# Patient Record
Sex: FEMALE | Race: WHITE | NOT HISPANIC OR LATINO | Employment: OTHER | URBAN - METROPOLITAN AREA
[De-identification: names, ages, dates, MRNs, and addresses within clinical notes are randomized per-mention and may not be internally consistent; named-entity substitution may affect disease eponyms.]

---

## 2017-01-03 ENCOUNTER — THERAPY VISIT (OUTPATIENT)
Dept: PHYSICAL THERAPY | Facility: CLINIC | Age: 62
End: 2017-01-03

## 2017-01-03 DIAGNOSIS — M25.511 SHOULDER PAIN, RIGHT: Primary | ICD-10-CM

## 2017-01-03 PROCEDURE — 97140 MANUAL THERAPY 1/> REGIONS: CPT | Mod: GP | Performed by: PHYSICAL THERAPIST

## 2017-01-03 PROCEDURE — 97110 THERAPEUTIC EXERCISES: CPT | Mod: GP | Performed by: PHYSICAL THERAPIST

## 2017-01-10 ENCOUNTER — THERAPY VISIT (OUTPATIENT)
Dept: PHYSICAL THERAPY | Facility: CLINIC | Age: 62
End: 2017-01-10

## 2017-01-10 DIAGNOSIS — M25.511 SHOULDER PAIN, RIGHT: Primary | ICD-10-CM

## 2017-01-10 PROCEDURE — 97110 THERAPEUTIC EXERCISES: CPT | Mod: GP | Performed by: PHYSICAL THERAPIST

## 2017-01-10 PROCEDURE — 97140 MANUAL THERAPY 1/> REGIONS: CPT | Mod: GP | Performed by: PHYSICAL THERAPIST

## 2017-01-17 ENCOUNTER — THERAPY VISIT (OUTPATIENT)
Dept: PHYSICAL THERAPY | Facility: CLINIC | Age: 62
End: 2017-01-17

## 2017-01-17 DIAGNOSIS — M25.511 SHOULDER PAIN, RIGHT: Primary | ICD-10-CM

## 2017-01-17 PROCEDURE — 97140 MANUAL THERAPY 1/> REGIONS: CPT | Mod: GP | Performed by: PHYSICAL THERAPIST

## 2017-01-17 PROCEDURE — 97110 THERAPEUTIC EXERCISES: CPT | Mod: GP | Performed by: PHYSICAL THERAPIST

## 2017-01-26 ENCOUNTER — THERAPY VISIT (OUTPATIENT)
Dept: PHYSICAL THERAPY | Facility: CLINIC | Age: 62
End: 2017-01-26

## 2017-01-26 DIAGNOSIS — M25.511 SHOULDER PAIN, RIGHT: Primary | ICD-10-CM

## 2017-01-26 PROCEDURE — 97140 MANUAL THERAPY 1/> REGIONS: CPT | Mod: GP | Performed by: PHYSICAL THERAPIST

## 2017-01-26 PROCEDURE — 97110 THERAPEUTIC EXERCISES: CPT | Mod: GP | Performed by: PHYSICAL THERAPIST

## 2017-01-31 ENCOUNTER — THERAPY VISIT (OUTPATIENT)
Dept: PHYSICAL THERAPY | Facility: CLINIC | Age: 62
End: 2017-01-31

## 2017-01-31 DIAGNOSIS — M25.511 SHOULDER PAIN, RIGHT: Primary | ICD-10-CM

## 2017-01-31 PROCEDURE — 97140 MANUAL THERAPY 1/> REGIONS: CPT | Mod: GP | Performed by: PHYSICAL THERAPIST

## 2017-01-31 PROCEDURE — 97110 THERAPEUTIC EXERCISES: CPT | Mod: GP | Performed by: PHYSICAL THERAPIST

## 2017-03-27 DIAGNOSIS — E03.9 HYPOTHYROIDISM, UNSPECIFIED TYPE: ICD-10-CM

## 2017-03-27 NOTE — TELEPHONE ENCOUNTER
Reason for Call:  Medication or medication refill:    Do you use a [facility]?  Name of the pharmacy and phone number for the current request:  See above    Name of the medication requested: levothyroxine    Other request: patient is going out of town and needs her medication at the very latest on Tuesday.  please    Can we leave a detailed message on this number? YES    Phone number patient can be reached at: Home number on file [phone] (home)    Best Time:     Call taken on 3/27/2017 at 8:54 AM by Cata Ma

## 2017-03-28 RX ORDER — LEVOTHYROXINE SODIUM 50 UG/1
50 TABLET ORAL DAILY
Qty: 30 TABLET | Refills: 0 | Status: SHIPPED | OUTPATIENT
Start: 2017-03-28 | End: 2017-04-24

## 2017-03-28 NOTE — TELEPHONE ENCOUNTER
Medication is being filled for 1 time refill only due to:   Over due for office visit and/or labs   ROBERT Chiu  RN/Andrey Grady

## 2017-04-24 DIAGNOSIS — E03.9 HYPOTHYROIDISM, UNSPECIFIED TYPE: ICD-10-CM

## 2017-04-24 NOTE — TELEPHONE ENCOUNTER
Pt is calling and states that she knows she needs to be seen in order to get a refill on her medication, but is waiting to get long term heatlh care insurance and it might be three months yet, she would  Have to pay out of poocket to come in, she is asking for another 3 month supply on her synthroid, she states she feels fine. Please advise.

## 2017-04-25 RX ORDER — LEVOTHYROXINE SODIUM 50 UG/1
50 TABLET ORAL DAILY
Qty: 90 TABLET | Refills: 0 | Status: SHIPPED | OUTPATIENT
Start: 2017-04-25 | End: 2017-07-29

## 2017-04-25 NOTE — TELEPHONE ENCOUNTER
Pharmacy is calling and asking if  We can fill this medication ASAP, pt does also have the appt today and is wondering if she can cancel it? Please see note below and advise.     Patricia Squires, [place]

## 2017-07-22 ENCOUNTER — HEALTH MAINTENANCE LETTER (OUTPATIENT)
Age: 62
End: 2017-07-22

## 2017-07-29 DIAGNOSIS — E03.9 HYPOTHYROIDISM, UNSPECIFIED TYPE: ICD-10-CM

## 2017-07-31 RX ORDER — LEVOTHYROXINE SODIUM 50 UG/1
TABLET ORAL
Qty: 30 TABLET | Refills: 0 | Status: SHIPPED | OUTPATIENT
Start: 2017-07-31 | End: 2017-08-08

## 2017-07-31 NOTE — TELEPHONE ENCOUNTER
Levothyroxine 50mcg     Last Written Prescription Date: 04/25/2017 #90 x 0  Last filled 04/25/2017  Last Office Visit with G, P or [facility] prescribing provider: 12/14/2015 RENAY Balbuena        TSH   Date Value Ref Range Status   12/11/2015 3.38 0.40 - 4.00 mU/L Final

## 2017-08-08 ENCOUNTER — OFFICE VISIT (OUTPATIENT)
Dept: FAMILY MEDICINE | Facility: CLINIC | Age: 62
End: 2017-08-08

## 2017-08-08 VITALS
WEIGHT: 139.6 LBS | DIASTOLIC BLOOD PRESSURE: 62 MMHG | TEMPERATURE: 99 F | HEART RATE: 76 BPM | HEIGHT: 65 IN | BODY MASS INDEX: 23.26 KG/M2 | SYSTOLIC BLOOD PRESSURE: 112 MMHG

## 2017-08-08 DIAGNOSIS — N95.2 ATROPHIC VAGINITIS: ICD-10-CM

## 2017-08-08 DIAGNOSIS — Z80.49 FAMILY HISTORY OF MALIGNANT NEOPLASM OF ENDOMETRIUM: ICD-10-CM

## 2017-08-08 DIAGNOSIS — Z11.59 NEED FOR HEPATITIS C SCREENING TEST: ICD-10-CM

## 2017-08-08 DIAGNOSIS — Z13.6 CARDIOVASCULAR SCREENING; LDL GOAL LESS THAN 160: ICD-10-CM

## 2017-08-08 DIAGNOSIS — E03.9 HYPOTHYROIDISM, UNSPECIFIED TYPE: ICD-10-CM

## 2017-08-08 DIAGNOSIS — Z01.419 ENCOUNTER FOR GYNECOLOGICAL EXAMINATION WITHOUT ABNORMAL FINDING: Primary | ICD-10-CM

## 2017-08-08 PROCEDURE — G0145 SCR C/V CYTO,THINLAYER,RESCR: HCPCS | Performed by: NURSE PRACTITIONER

## 2017-08-08 PROCEDURE — 99396 PREV VISIT EST AGE 40-64: CPT | Performed by: NURSE PRACTITIONER

## 2017-08-08 PROCEDURE — 87624 HPV HI-RISK TYP POOLED RSLT: CPT | Performed by: NURSE PRACTITIONER

## 2017-08-08 RX ORDER — LEVOTHYROXINE SODIUM 50 UG/1
TABLET ORAL
Qty: 90 TABLET | Refills: 3 | Status: SHIPPED | OUTPATIENT
Start: 2017-08-08 | End: 2018-08-19

## 2017-08-08 RX ORDER — GINKGO BILOBA LEAF EXTRACT 60 MG
CAPSULE ORAL
COMMUNITY
End: 2018-08-30

## 2017-08-08 NOTE — PATIENT INSTRUCTIONS
Preventive Health Recommendations  Female Ages 50 - 64    Yearly exam: See your health care provider every year in order to  o Review health changes.   o Discuss preventive care.    o Review your medicines if your doctor has prescribed any.      Get a Pap test every three years (unless you have an abnormal result and your provider advises testing more often).    If you get Pap tests with HPV test, you only need to test every 5 years, unless you have an abnormal result.     You do not need a Pap test if your uterus was removed (hysterectomy) and you have not had cancer.    You should be tested each year for STDs (sexually transmitted diseases) if you're at risk.     Have a mammogram every 1 to 2 years.    Have a colonoscopy at age 50, or have a yearly FIT test (stool test). These exams screen for colon cancer.      Have a cholesterol test every 5 years, or more often if advised.    Have a diabetes test (fasting glucose) every three years. If you are at risk for diabetes, you should have this test more often.     If you are at risk for osteoporosis (brittle bone disease), think about having a bone density scan (DEXA).    Shots: Get a flu shot each year. Get a tetanus shot every 10 years.    Nutrition:     Eat at least 5 servings of fruits and vegetables each day.    Eat whole-grain bread, whole-wheat pasta and brown rice instead of white grains and rice.    Talk to your provider about Calcium and Vitamin D.     Lifestyle    Exercise at least 150 minutes a week (30 minutes a day, 5 days a week). This will help you control your weight and prevent disease.    Limit alcohol to one drink per day.    No smoking.     Wear sunscreen to prevent skin cancer.     See your dentist every six months for an exam and cleaning.    See your eye doctor every 1 to 2 years.      For the neck  - try stretching . For 10 seconds.  - resist for 10 seconds and then stretch for 10 seconds     Check with the different pharmacies.   The   Shingles vaccine is around  $250.   The Pneumonia  Vaccine  13 this year and then next year Pneumonia vaccine  23     Keep exercising .     Stay well hydrated - urine should be clear.          You will need to make a lab only appointment,  [phone].   Do come in fasting

## 2017-08-08 NOTE — NURSING NOTE
"Chief Complaint   Patient presents with     Physical       Initial /62 (BP Location: Left arm, Patient Position: Chair, Cuff Size: Adult Regular)  Pulse 76  Temp 99  F (37.2  C) (Tympanic)  Ht 5' 5.25\" (1.657 m)  Wt 139 lb 9.6 oz (63.3 kg)  BMI 23.05 kg/m2 Estimated body mass index is 23.05 kg/(m^2) as calculated from the following:    Height as of this encounter: 5' 5.25\" (1.657 m).    Weight as of this encounter: 139 lb 9.6 oz (63.3 kg).  Medication Reconciliation: complete     Brisa Nevarez CMA (AAMA)      "

## 2017-08-08 NOTE — PROGRESS NOTES
SUBJECTIVE:   CC: Eleazar Cortez is an 62 year old woman who presents for preventive health visit.     Healthy Habits:    Do you get at least three servings of calcium containing foods daily (dairy, green leafy vegetables, etc.)? yes    Amount of exercise or daily activities, outside of work: 3 or more day(s) per week  walking ,  [facility]     Problems taking medications regularly No    Medication side effects: No    Have you had an eye exam in the past two years? yes    Do you see a dentist twice per year? yes    Do you have sleep apnea, excessive snoring or daytime drowsiness?no    *Is not fasting.    *Check thyroid.    *No health care concerns. Is working out [facility] [employer] 12 times per month     Today's PHQ-2 Score:   PHQ-2 ( 1999 [employer]) 8/8/2017 12/14/2015   Q1: Little interest or pleasure in doing things 0 0   Q2: Feeling down, depressed or hopeless 0 0   PHQ-2 Score 0 0         Abuse: Current or Past(Physical, Sexual or Emotional)- No  Do you feel safe in your environment - Yes  Social History   Substance Use Topics     Smoking status: Never Smoker     Smokeless tobacco: Never Used     Alcohol use No     The patient does not drink >3 drinks per day nor >7 drinks per week.    Reviewed orders with patient.  Reviewed health maintenance and updated orders accordingly - Yes  Labs reviewed in [facility]    Patient over age 50, mutual decision to screen reflected in health maintenance.      Pertinent mammograms are reviewed under the imaging tab.  History of abnormal Pap smear: NO - age 30- 65 PAP every 3 years recommended    Reviewed and updated as needed this visit by clinical staff  Tobacco  Allergies  Meds  Med Hx  Surg Hx  Fam Hx  Soc Hx        Reviewed and updated as needed this visit by Provider  Allergies  Meds              ROS:  C: NEGATIVE for fever, chills, change in weight  INTEGUMENTARY/SKIN: NEGATIVE for worrisome rashes, moles or lesions  EYES: NEGATIVE for vision changes or irritation and POSITIVE for  "does have floaters.  Current with eye exam   ENT: NEGATIVE for ear, mouth and throat problems and current with dentist   R: NEGATIVE for significant cough or SOB  B: NEGATIVE for masses, tenderness or discharge  CV: NEGATIVE for chest pain, palpitations or peripheral edema  GI: NEGATIVE for nausea, abdominal pain, heartburn, or change in bowel habits  : NEGATIVE for unusual urinary or vaginal symptoms. No vaginal bleeding.  M: NEGATIVE for significant arthralgias or myalgia  N: NEGATIVE for weakness, dizziness or paresthesias  P: NEGATIVE for changes in mood or affect     OBJECTIVE:   /62 (BP Location: Left arm, Patient Position: Chair, Cuff Size: Adult Regular)  Pulse 76  Temp 99  F (37.2  C) (Tympanic)  Ht 5' 5.25\" (1.657 m)  Wt 139 lb 9.6 oz (63.3 kg)  BMI 23.05 kg/m2  EXAM:  GENERAL APPEARANCE: healthy, alert and no distress  EYES: Eyes grossly normal to inspection, PERRL and conjunctivae and sclerae normal  HENT: ear canals and TM's normal, nose and mouth without ulcers or lesions, oropharynx clear and oral mucous membranes moist  NECK: no adenopathy, no asymmetry, masses, or scars and thyroid normal to palpation  RESP: lungs clear to auscultation - no rales, rhonchi or wheezes  BREAST: normal without masses, tenderness or nipple discharge and no palpable axillary masses or adenopathy  CV: regular rate and rhythm, normal S1 S2, no S3 or S4, no murmur, click or rub, no peripheral edema and peripheral pulses strong  ABDOMEN: soft, nontender, no hepatosplenomegaly, no masses and bowel sounds normal   (female): normal female external genitalia, normal urethral meatus, vaginal mucosal atrophy noted and normal cervix, adnexae, and uterus without masses.  MS: no musculoskeletal defects are noted and gait is age appropriate without ataxia  SKIN: no suspicious lesions or rashes  NEURO: Normal strength and tone, sensory exam grossly normal, mentation intact and speech normal  PSYCH: mentation appears " normal and affect normal/bright    ASSESSMENT/PLAN:     ASSESSMENT/PLAN:      ICD-10-CM    1. Encounter for gynecological examination without abnormal finding Z01.419 Pap imaged thin layer screen with HPV - recommended age 30 - 65     HPV High Risk Types DNA Cervical     Basic metabolic panel  (Ca, Cl, CO2, Creat, Gluc, K, Na, BUN)     CANCELED: Basic metabolic panel  (Ca, Cl, CO2, Creat, Gluc, K, Na, BUN)   2. Hypothyroidism, unspecified type E03.9 levothyroxine (SYNTHROID/LEVOTHROID) 50 MCG tablet     TSH     CANCELED: TSH   3. CARDIOVASCULAR SCREENING; LDL GOAL LESS THAN 160 Z13.6 Lipid panel reflex to direct LDL     CANCELED: Lipid panel reflex to direct LDL   4. Need for hepatitis C screening test Z11.59 Hepatitis C Screen Reflex to HCV RNA Quant and Genotype     CANCELED: **Hepatitis C Screen Reflex to RNA FUTURE anytime   5. Family history of malignant neoplasm of endometrium Z80.49 Pap imaged thin layer screen with HPV - recommended age 30 - 65    mother at age 83   6. Atrophic vaginitis N95.2        Patient Instructions     Preventive Health Recommendations  Female Ages 50 - 64    Yearly exam: See your health care provider every year in order to  o Review health changes.   o Discuss preventive care.    o Review your medicines if your doctor has prescribed any.      Get a Pap test every three years (unless you have an abnormal result and your provider advises testing more often).    If you get Pap tests with HPV test, you only need to test every 5 years, unless you have an abnormal result.     You do not need a Pap test if your uterus was removed (hysterectomy) and you have not had cancer.    You should be tested each year for STDs (sexually transmitted diseases) if you're at risk.     Have a mammogram every 1 to 2 years.    Have a colonoscopy at age 50, or have a yearly FIT test (stool test). These exams screen for colon cancer.      Have a cholesterol test every 5 years, or more often if advised.    Have a  "diabetes test (fasting glucose) every three years. If you are at risk for diabetes, you should have this test more often.     If you are at risk for osteoporosis (brittle bone disease), think about having a bone density scan (DEXA).    Shots: Get a flu shot each year. Get a tetanus shot every 10 years.    Nutrition:     Eat at least 5 servings of fruits and vegetables each day.    Eat whole-grain bread, whole-wheat pasta and brown rice instead of white grains and rice.    Talk to your provider about Calcium and Vitamin D.     Lifestyle    Exercise at least 150 minutes a week (30 minutes a day, 5 days a week). This will help you control your weight and prevent disease.    Limit alcohol to one drink per day.    No smoking.     Wear sunscreen to prevent skin cancer.     See your dentist every six months for an exam and cleaning.    See your eye doctor every 1 to 2 years.      For the neck  - try stretching . For 10 seconds.  - resist for 10 seconds and then stretch for 10 seconds     Check with the different pharmacies.   The  Shingles vaccine is around  $250.   The Pneumonia  Vaccine  13 this year and then next year Pneumonia vaccine  23     Keep exercising .     Stay well hydrated - urine should be clear.          You will need to make a lab only appointment,  [phone].   Do come in fasting                COUNSELING:   Reviewed preventive health counseling, as reflected in patient instructions       Regular exercise       Healthy diet/nutrition       Vision screening       Colon cancer screening       Advance Care Planning         reports that she has never smoked. She has never used smokeless tobacco.    Estimated body mass index is 23.05 kg/(m^2) as calculated from the following:    Height as of this encounter: 5' 5.25\" (1.657 m).    Weight as of this encounter: 139 lb 9.6 oz (63.3 kg).         Counseling Resources:  ATP IV Guidelines  Pooled Cohorts Equation Calculator  Breast Cancer Risk Calculator  FRAX Risk " Assessment  ICSI Preventive Guidelines  Dietary Guidelines for Americans, 2010  USDA's MyPlate  ASA Prophylaxis  Lung CA Screening    ADIN RODRIGUEZ NP, APRN CNP  [facility]

## 2017-08-08 NOTE — MR AVS SNAPSHOT
After Visit Summary   8/8/2017    Eleazar Cortez    MRN: [id number]           Patient Information     Date Of Birth          1955        Visit Information        Provider Department      8/8/2017 2:00 PM Kika Balbuena APRN [facility]        Today's Diagnoses     Encounter for gynecological examination without abnormal finding    -  1    Hypothyroidism, unspecified type        CARDIOVASCULAR SCREENING; LDL GOAL LESS THAN 160        Need for hepatitis C screening test        Family history of malignant neoplasm of endometrium        Atrophic vaginitis          Care Instructions      Preventive Health Recommendations  Female Ages 50 - 64    Yearly exam: See your health care provider every year in order to  o Review health changes.   o Discuss preventive care.    o Review your medicines if your doctor has prescribed any.      Get a Pap test every three years (unless you have an abnormal result and your provider advises testing more often).    If you get Pap tests with HPV test, you only need to test every 5 years, unless you have an abnormal result.     You do not need a Pap test if your uterus was removed (hysterectomy) and you have not had cancer.    You should be tested each year for STDs (sexually transmitted diseases) if you're at risk.     Have a mammogram every 1 to 2 years.    Have a colonoscopy at age 50, or have a yearly FIT test (stool test). These exams screen for colon cancer.      Have a cholesterol test every 5 years, or more often if advised.    Have a diabetes test (fasting glucose) every three years. If you are at risk for diabetes, you should have this test more often.     If you are at risk for osteoporosis (brittle bone disease), think about having a bone density scan (DEXA).    Shots: Get a flu shot each year. Get a tetanus shot every 10 years.    Nutrition:     Eat at least 5 servings of fruits and vegetables each day.    Eat whole-grain bread,  whole-wheat pasta and brown rice instead of white grains and rice.    Talk to your provider about Calcium and Vitamin D.     Lifestyle    Exercise at least 150 minutes a week (30 minutes a day, 5 days a week). This will help you control your weight and prevent disease.    Limit alcohol to one drink per day.    No smoking.     Wear sunscreen to prevent skin cancer.     See your dentist every six months for an exam and cleaning.    See your eye doctor every 1 to 2 years.      For the neck  - try stretching . For 10 seconds.  - resist for 10 seconds and then stretch for 10 seconds     Check with the different pharmacies.   The  Shingles vaccine is around  $250.   The Pneumonia  Vaccine  13 this year and then next year Pneumonia vaccine  23     Keep exercising .     Stay well hydrated - urine should be clear.          You will need to make a lab only appointment,  [phone].   Do come in fasting              Follow-ups after your visit        Future tests that were ordered for you today     Open Future Orders        Priority Expected Expires Ordered    TSH Routine  8/31/2017 8/8/2017    Lipid panel reflex to direct LDL Routine  8/31/2017 8/8/2017    Basic metabolic panel  (Ca, Cl, CO2, Creat, Gluc, K, Na, BUN) Routine  8/31/2017 8/8/2017    Hepatitis C Screen Reflex to HCV RNA Quant and Genotype Routine  8/31/2017 8/8/2017            Who to contact     Normal or non-critical lab and imaging results will be communicated to you by MyChart, letter or phone within 4 business days after the clinic has received the results. If you do not hear from us within 7 days, please contact the clinic through MyChart or phone. If you have a critical or abnormal lab result, we will notify you by phone as soon as possible.  Submit refill requests through [employer] or call your pharmacy and they will forward the refill request to us. Please allow 3 business days for your refill to be completed.          If you need to speak with a Medical  " for additional information , please call: [phone]           Additional Information About Your Visit        emidshart Information     [employer] gives you secure access to your electronic health record. If you see a primary care provider, you can also send messages to your care team and make appointments. If you have questions, please call your primary care clinic.  If you do not have a primary care provider, please call [phone] and they will assist you.        Care EveryWhere ID     This is your Care EveryWhere ID. This could be used by other organizations to access your [facility] medical records  [id number]        Your Vitals Were     Pulse Temperature Height BMI (Body Mass Index)          76 99  F (37.2  C) (Tympanic) 5' 5.25\" (1.657 m) 23.05 kg/m2         Blood Pressure from Last 3 Encounters:   08/08/17 112/62   12/18/15 100/60   12/14/15 108/66    Weight from Last 3 Encounters:   08/08/17 139 lb 9.6 oz (63.3 kg)   12/18/15 126 lb (57.2 kg)   12/14/15 126 lb (57.2 kg)              We Performed the Following     HPV High Risk Types DNA Cervical     Pap imaged thin layer screen with HPV - recommended age 30 - 65          Today's Medication Changes          These changes are accurate as of: 8/8/17  3:08 PM.  If you have any questions, ask your nurse or doctor.               These medicines have changed or have updated prescriptions.        Dose/Directions    levothyroxine 50 MCG tablet   Commonly known as:  SYNTHROID/LEVOTHROID   This may have changed:  See the new instructions.   Used for:  Hypothyroidism, unspecified type   Changed by:  Kika Balbuena APRN CNP        TAKE 1 TABLET (50 MCG) BY MOUTH DAILY   Quantity:  90 tablet   Refills:  3            Where to get your medicines      These medications were sent to [facility] [address] [place] [facility] [address]  [address] [facility] [address]     Phone:  [phone]     levothyroxine 50 MCG tablet                Primary " Care Provider Office Phone # Fax #    Kika Valentin PepeRita Balbuena, SINGH [facility] [phone]       [facility] [address]   [facility] [place] [facility] [address]        Equal Access to Services     KAYE REYES AH: Hadbishnu mora ku hildao Soomaali, waaxda luqadaha, qaybta kaalmada adeegyada, waxcarol rauschin lexusn richarjun tolentino stalin wood. So [facility] [phone].    ATENCIÓN: Si habla español, tiene a chow disposición servicios gratuitos de asistencia lingüística. Llame al [phone].    We comply with applicable federal civil rights laws and [place] laws. We do not discriminate on the basis of race, color, national origin, age, disability sex, sexual orientation or gender identity.            Thank you!     Thank you for choosing [facility]  for your care. Our goal is always to provide you with excellent care. Hearing back from our patients is one way we can continue to improve our services. Please take a few minutes to complete the written survey that you may receive in the mail after your visit with us. Thank you!             Your Updated Medication List - Protect others around you: Learn how to safely use, store and throw away your medicines at www.disposemymeds.org.          This list is accurate as of: 8/8/17  3:08 PM.  Always use your most recent med list.                   Brand Name Dispense Instructions for use Diagnosis    calcium + D 600-200 MG-UNIT Tabs   Generic drug:  calcium carbonate-vitamin D      1 TABLET DAILY        CENTRY ADVANTAGE PO      1 tablet by mouth daily    Routine general medical examination at a health care facility       conjugated estrogens cream    PREMARIN    30 g    Place 0.5 g vaginally twice a week    Atrophic vaginitis       GARLIC      1,000 mg.        GINKGO BILOBA      50 mg.    Routine general medical examination at a health care facility       Ginseng 100 MG Caps           GRAPE SEED COMPLEX PO      Take 1 tablet by mouth daily    Knee pain, right        ibuprofen 200 MG tablet    ADVIL/MOTRIN     Take 800 mg by mouth every 8 hours as needed.        levothyroxine 50 MCG tablet    SYNTHROID/LEVOTHROID    90 tablet    TAKE 1 TABLET (50 MCG) BY MOUTH DAILY    Hypothyroidism, unspecified type       omega-3 fatty acids 1200 MG capsule      Take 1 capsule by mouth daily.        PROBIOTIC DAILY PO           SUPER B COMPLEX PO      Take  by mouth daily.        UNABLE TO FIND      MEDICATION NAME: Replenix (glucosamine, ginseng, enzymes, calcium and green tea)        VITAMIN D3      2,000 Units.

## 2017-08-15 LAB
COPATH REPORT: NORMAL
PAP: NORMAL

## 2017-08-16 LAB
FINAL DIAGNOSIS: NORMAL
HPV HR 12 DNA CVX QL NAA+PROBE: NEGATIVE
HPV16 DNA SPEC QL NAA+PROBE: NEGATIVE
HPV18 DNA SPEC QL NAA+PROBE: NEGATIVE
SPECIMEN DESCRIPTION: NORMAL

## 2017-08-22 ENCOUNTER — ALLIED HEALTH/NURSE VISIT (OUTPATIENT)
Dept: FAMILY MEDICINE | Facility: CLINIC | Age: 62
End: 2017-08-22

## 2017-08-22 DIAGNOSIS — Z13.6 CARDIOVASCULAR SCREENING; LDL GOAL LESS THAN 160: ICD-10-CM

## 2017-08-22 DIAGNOSIS — Z23 ENCOUNTER FOR IMMUNIZATION: Primary | ICD-10-CM

## 2017-08-22 DIAGNOSIS — E03.9 HYPOTHYROIDISM, UNSPECIFIED TYPE: ICD-10-CM

## 2017-08-22 DIAGNOSIS — Z01.419 ENCOUNTER FOR GYNECOLOGICAL EXAMINATION WITHOUT ABNORMAL FINDING: ICD-10-CM

## 2017-08-22 DIAGNOSIS — Z11.59 NEED FOR HEPATITIS C SCREENING TEST: ICD-10-CM

## 2017-08-22 LAB
ANION GAP SERPL CALCULATED.3IONS-SCNC: 4 MMOL/L (ref 3–14)
BUN SERPL-MCNC: 16 MG/DL (ref 7–30)
CALCIUM SERPL-MCNC: 8.8 MG/DL (ref 8.5–10.1)
CHLORIDE SERPL-SCNC: 106 MMOL/L (ref 94–109)
CHOLEST SERPL-MCNC: 256 MG/DL
CO2 SERPL-SCNC: 30 MMOL/L (ref 20–32)
CREAT SERPL-MCNC: 1.02 MG/DL (ref 0.52–1.04)
GFR SERPL CREATININE-BSD FRML MDRD: 55 ML/MIN/1.7M2
GLUCOSE SERPL-MCNC: 87 MG/DL (ref 70–99)
HDLC SERPL-MCNC: 88 MG/DL
LDLC SERPL CALC-MCNC: 146 MG/DL
NONHDLC SERPL-MCNC: 168 MG/DL
POTASSIUM SERPL-SCNC: 4 MMOL/L (ref 3.4–5.3)
SODIUM SERPL-SCNC: 140 MMOL/L (ref 133–144)
TRIGL SERPL-MCNC: 112 MG/DL
TSH SERPL DL<=0.005 MIU/L-ACNC: 2.36 MU/L (ref 0.4–4)

## 2017-08-22 PROCEDURE — 90471 IMMUNIZATION ADMIN: CPT

## 2017-08-22 PROCEDURE — 84443 ASSAY THYROID STIM HORMONE: CPT | Performed by: NURSE PRACTITIONER

## 2017-08-22 PROCEDURE — 86803 HEPATITIS C AB TEST: CPT | Performed by: NURSE PRACTITIONER

## 2017-08-22 PROCEDURE — 36415 COLL VENOUS BLD VENIPUNCTURE: CPT | Performed by: NURSE PRACTITIONER

## 2017-08-22 PROCEDURE — 80048 BASIC METABOLIC PNL TOTAL CA: CPT | Performed by: NURSE PRACTITIONER

## 2017-08-22 PROCEDURE — 80061 LIPID PANEL: CPT | Performed by: NURSE PRACTITIONER

## 2017-08-22 PROCEDURE — 90670 PCV13 VACCINE IM: CPT

## 2017-08-22 PROCEDURE — 99207 ZZC NO CHARGE NURSE ONLY: CPT

## 2017-08-23 LAB — HCV AB SERPL QL IA: NONREACTIVE

## 2018-04-12 ENCOUNTER — TELEPHONE (OUTPATIENT)
Dept: FAMILY MEDICINE | Facility: CLINIC | Age: 63
End: 2018-04-12

## 2018-04-12 DIAGNOSIS — Z12.31 ENCOUNTER FOR SCREENING MAMMOGRAM FOR MALIGNANT NEOPLASM OF BREAST: Primary | ICD-10-CM

## 2018-04-12 NOTE — TELEPHONE ENCOUNTER
Panel Management Review      Patient has the following on her problem list: None      Composite cancer screening  Chart review shows that this patient is due/due soon for the following Mammogram  Summary:    Patient is due/failing the following:   MAMMOGRAM    Action needed:   Patient needs referral/order: MAMMOGRAM    Type of outreach:    Sent Directlyt message.    Questions for provider review:    None                                                                                                                                    Brisa Mercer CMA ([facility])       Chart routed to None .

## 2018-08-09 ENCOUNTER — HOSPITAL ENCOUNTER (OUTPATIENT)
Dept: MAMMOGRAPHY | Facility: CLINIC | Age: 63
Discharge: HOME | End: 2018-08-09
Attending: NURSE PRACTITIONER | Admitting: NURSE PRACTITIONER

## 2018-08-09 DIAGNOSIS — Z12.31 ENCOUNTER FOR SCREENING MAMMOGRAM FOR MALIGNANT NEOPLASM OF BREAST: ICD-10-CM

## 2018-08-09 PROCEDURE — 77063 BREAST TOMOSYNTHESIS BI: CPT

## 2018-08-19 DIAGNOSIS — E03.9 HYPOTHYROIDISM, UNSPECIFIED TYPE: ICD-10-CM

## 2018-08-20 NOTE — TELEPHONE ENCOUNTER
"Requested Prescriptions   Pending Prescriptions Disp Refills     levothyroxine (SYNTHROID/LEVOTHROID) 50 MCG tablet [Pharmacy Med Name: LEVOTHYROXINE 50 MCG TABLET] 90 tablet 3    Last Written Prescription Date:  08/08/2017 #90 x 3  Last filled 05/22/2018  Last office visit: 08/08/2017 RENAY Balbuena   Future Office Visit: None    Sig: TAKE 1 TABLET (50 MCG) BY MOUTH DAILY    Thyroid Protocol Failed    8/19/2018  1:44 AM       Failed - Recent (12 mo) or future (30 days) visit within the authorizing provider's specialty    Patient had office visit in the last 12 months or has a visit in the next 30 days with authorizing provider or within the authorizing provider's specialty.  See \"Patient Info\" tab in inbasket, or \"Choose Columns\" in Meds & Orders section of the refill encounter.           Passed - Patient is 12 years or older       Passed - Normal TSH on file in past 12 months    Recent Labs   Lab Test  08/22/17   0845   TSH  2.36             Passed - No active pregnancy on record    If patient is pregnant or has had a positive pregnancy test, please check TSH.         Passed - No positive pregnancy test in past 12 months    If patient is pregnant or has had a positive pregnancy test, please check TSH.            "

## 2018-08-21 RX ORDER — LEVOTHYROXINE SODIUM 50 UG/1
50 TABLET ORAL DAILY
Qty: 30 TABLET | Refills: 0 | Status: SHIPPED | OUTPATIENT
Start: 2018-08-21 | End: 2018-09-24

## 2018-08-21 NOTE — TELEPHONE ENCOUNTER
Medication is being filled for 1 time refill only due to:  Patient needs labs TSH. Patient needs to be seen because it has been more than one year since last visit.   I left a message for the patient to return call. CSS please assist pt in scheduling Office Visit.    Danielle DUARTE RN

## 2018-08-30 ENCOUNTER — OFFICE VISIT (OUTPATIENT)
Dept: FAMILY MEDICINE | Facility: CLINIC | Age: 63
End: 2018-08-30

## 2018-08-30 VITALS
SYSTOLIC BLOOD PRESSURE: 104 MMHG | WEIGHT: 139.6 LBS | HEART RATE: 76 BPM | BODY MASS INDEX: 23.26 KG/M2 | DIASTOLIC BLOOD PRESSURE: 58 MMHG | RESPIRATION RATE: 14 BRPM | TEMPERATURE: 97.4 F | HEIGHT: 65 IN

## 2018-08-30 DIAGNOSIS — Z11.4 SCREENING FOR HIV (HUMAN IMMUNODEFICIENCY VIRUS): ICD-10-CM

## 2018-08-30 DIAGNOSIS — Z13.6 CARDIOVASCULAR SCREENING; LDL GOAL LESS THAN 160: ICD-10-CM

## 2018-08-30 DIAGNOSIS — E03.9 HYPOTHYROIDISM, UNSPECIFIED TYPE: ICD-10-CM

## 2018-08-30 DIAGNOSIS — Z85.828 HISTORY OF SKIN CANCER: ICD-10-CM

## 2018-08-30 DIAGNOSIS — R53.83 FATIGUE, UNSPECIFIED TYPE: ICD-10-CM

## 2018-08-30 DIAGNOSIS — Z00.00 ROUTINE GENERAL MEDICAL EXAMINATION AT A HEALTH CARE FACILITY: Primary | ICD-10-CM

## 2018-08-30 DIAGNOSIS — E55.9 VITAMIN D DEFICIENCY: ICD-10-CM

## 2018-08-30 LAB
ALBUMIN SERPL-MCNC: 3.7 G/DL (ref 3.4–5)
ALP SERPL-CCNC: 56 U/L (ref 40–150)
ALT SERPL W P-5'-P-CCNC: 17 U/L (ref 0–50)
ANION GAP SERPL CALCULATED.3IONS-SCNC: 5 MMOL/L (ref 3–14)
AST SERPL W P-5'-P-CCNC: 22 U/L (ref 0–45)
BILIRUB SERPL-MCNC: 0.8 MG/DL (ref 0.2–1.3)
BUN SERPL-MCNC: 13 MG/DL (ref 7–30)
CALCIUM SERPL-MCNC: 8.6 MG/DL (ref 8.5–10.1)
CHLORIDE SERPL-SCNC: 103 MMOL/L (ref 94–109)
CHOLEST SERPL-MCNC: 241 MG/DL
CO2 SERPL-SCNC: 29 MMOL/L (ref 20–32)
CREAT SERPL-MCNC: 0.96 MG/DL (ref 0.52–1.04)
GFR SERPL CREATININE-BSD FRML MDRD: 59 ML/MIN/1.7M2
GLUCOSE SERPL-MCNC: 83 MG/DL (ref 70–99)
HDLC SERPL-MCNC: 86 MG/DL
LDLC SERPL CALC-MCNC: 136 MG/DL
NONHDLC SERPL-MCNC: 155 MG/DL
POTASSIUM SERPL-SCNC: 4 MMOL/L (ref 3.4–5.3)
PROT SERPL-MCNC: 7.8 G/DL (ref 6.8–8.8)
SODIUM SERPL-SCNC: 137 MMOL/L (ref 133–144)
T3 SERPL-MCNC: 103 NG/DL (ref 60–181)
T3FREE SERPL-MCNC: 2.7 PG/ML (ref 2.3–4.2)
T4 FREE SERPL-MCNC: 1.13 NG/DL (ref 0.76–1.46)
TRIGL SERPL-MCNC: 96 MG/DL
TSH SERPL DL<=0.005 MIU/L-ACNC: 1.64 MU/L (ref 0.4–4)

## 2018-08-30 PROCEDURE — 84443 ASSAY THYROID STIM HORMONE: CPT | Performed by: NURSE PRACTITIONER

## 2018-08-30 PROCEDURE — 82306 VITAMIN D 25 HYDROXY: CPT | Performed by: NURSE PRACTITIONER

## 2018-08-30 PROCEDURE — 87389 HIV-1 AG W/HIV-1&-2 AB AG IA: CPT | Performed by: NURSE PRACTITIONER

## 2018-08-30 PROCEDURE — 84481 FREE ASSAY (FT-3): CPT | Performed by: NURSE PRACTITIONER

## 2018-08-30 PROCEDURE — 84480 ASSAY TRIIODOTHYRONINE (T3): CPT | Performed by: NURSE PRACTITIONER

## 2018-08-30 PROCEDURE — 84439 ASSAY OF FREE THYROXINE: CPT | Performed by: NURSE PRACTITIONER

## 2018-08-30 PROCEDURE — 80061 LIPID PANEL: CPT | Performed by: NURSE PRACTITIONER

## 2018-08-30 PROCEDURE — 99396 PREV VISIT EST AGE 40-64: CPT | Performed by: NURSE PRACTITIONER

## 2018-08-30 PROCEDURE — 80053 COMPREHEN METABOLIC PANEL: CPT | Performed by: NURSE PRACTITIONER

## 2018-08-30 PROCEDURE — 36415 COLL VENOUS BLD VENIPUNCTURE: CPT | Performed by: NURSE PRACTITIONER

## 2018-08-30 ASSESSMENT — PAIN SCALES - GENERAL: PAINLEVEL: NO PAIN (0)

## 2018-08-30 NOTE — LETTER
September 4, 2018      Eleazar Dana  [address] [place] [phone]              Eleazar,     I am helping to cover some of Kika's results since she is out of the office.     You are negative for HIV.    Your vitamin D level is in range.  These continue your calcium plus vitamin D supplement.      Resulted Orders   **HIV Antigen Antibody Combo FUTURE anytime   Result Value Ref Range    HIV Antigen Antibody Combo Nonreactive NR^Nonreactive          Comment:      HIV-1 p24 Ag & HIV-1/HIV-2 Ab Not Detected   Vitamin D Deficiency   Result Value Ref Range    Vitamin D Deficiency screening 66 20 - 75 ug/L      Comment:      Season, race, dietary intake, and treatment affect the concentration of   25-hydroxy-Vitamin D. Values may decrease during winter months and increase   during summer months. Values 20-29 ug/L may indicate Vitamin D insufficiency   and values <20 ug/L may indicate Vitamin D deficiency.  Vitamin D determination is routinely performed by an immunoassay specific for   25 hydroxyvitamin D3.  If an individual is on vitamin D2 (ergocalciferol)   supplementation, please specify 25 OH vitamin D2 and D3 level determination by   LCMSMS test [id number].         If you have any questions or concerns, please call the clinic at the number listed above.       Sincerely,        Chely Escamilla, SINGH, CNP/ag

## 2018-08-30 NOTE — PATIENT INSTRUCTIONS
Preventive Health Recommendations  Female Ages 50 - 64    Yearly exam: See your health care provider every year in order to  o Review health changes.   o Discuss preventive care.    o Review your medicines if your doctor has prescribed any.      Get a Pap test every three years (unless you have an abnormal result and your provider advises testing more often).    If you get Pap tests with HPV test, you only need to test every 5 years, unless you have an abnormal result.     You do not need a Pap test if your uterus was removed (hysterectomy) and you have not had cancer.    You should be tested each year for STDs (sexually transmitted diseases) if you're at risk.     Have a mammogram every 1 to 2 years.    Have a colonoscopy at age 50, or have a yearly FIT test (stool test). These exams screen for colon cancer.      Have a cholesterol test every 5 years, or more often if advised.    Have a diabetes test (fasting glucose) every three years. If you are at risk for diabetes, you should have this test more often.     If you are at risk for osteoporosis (brittle bone disease), think about having a bone density scan (DEXA).    Shots: Get a flu shot each year. Get a tetanus shot every 10 years.    Nutrition:     Eat at least 5 servings of fruits and vegetables each day.    Eat whole-grain bread, whole-wheat pasta and brown rice instead of white grains and rice.    Get adequate Calcium and Vitamin D.     Lifestyle    Exercise at least 150 minutes a week (30 minutes a day, 5 days a week). This will help you control your weight and prevent disease.    Limit alcohol to one drink per day.    No smoking.     Wear sunscreen to prevent skin cancer.     See your dentist every six months for an exam and cleaning.    See your eye doctor every 1 to 2 years.       for the foot pain .   Wear shoes/sandls in the house.    roll your foot over a ball,   Soak your feet in warm Epson salt water     Keep exercising     For the  fatigue/tiredness  I did order  TSH  ( thyroid) and Vitamin D     Dermatology referral - call for appointment

## 2018-08-30 NOTE — PROGRESS NOTES
SUBJECTIVE:   CC: Eleazar Cortez is an 63 year old woman who presents for preventive health visit.   Answers for HPI/ROS submitted by the patient on 8/29/2018   Annual Exam:  Getting at least 3 servings of Calcium per day:: Yes  Bi-annual eye exam:: Yes  Dental care twice a year:: Yes  Sleep apnea or symptoms of sleep apnea:: None  Diet:: Regular (no restrictions)  Frequency of exercise:: 2-3 days/week, Y -  Treadmil;,  TRX,  Weights ,   Taking medications regularly:: Yes  Medication side effects:: None  Additional concerns today:: No  PHQ-2 Score: 0  Duration of exercise:: 30-45 minutes    *Is fasting.    Today's PHQ-2 Score:   PHQ-2 ( 1999 Pfizer) 8/29/2018 8/8/2017   Q1: Little interest or pleasure in doing things 0 0   Q2: Feeling down, depressed or hopeless 0 0   PHQ-2 Score 0 0   Q1: Little interest or pleasure in doing things Not at all -   Q2: Feeling down, depressed or hopeless Not at all -   PHQ-2 Score 0 -     Abuse: Current or Past(Physical, Sexual or Emotional)- No  Do you feel safe in your environment - Yes    Social History   Substance Use Topics     Smoking status: Never Smoker     Smokeless tobacco: Never Used     Alcohol use No     If you drink alcohol do you typically have >3 drinks per day or >7 drinks per week? No                     Reviewed orders with patient.  Reviewed health maintenance and updated orders accordingly - Yes  Labs reviewed in [facility]    Patient over age 50, mutual decision to screen reflected in health maintenance.    Pertinent mammograms are reviewed under the imaging tab.  History of abnormal Pap smear: NO - age 30- 65 PAP every 3 years recommended  PAP / HPV Latest Ref Rng & Units 8/8/2017 5/9/2014 10/11/2012   PAP - NIL NIL NIL   HPV 16 DNA NEG:Negative Negative - -   HPV 18 DNA NEG:Negative Negative - -   OTHER HR HPV NEG:Negative Negative - -     Reviewed and updated as needed this visit by clinical staff  Tobacco  Allergies  Meds  Med Hx  Surg Hx  Fam Hx  Soc Hx     "    Reviewed and updated as needed this visit by Provider  Tobacco  Allergies  Meds  Med Hx  Surg Hx  Fam Hx  Soc Hx           ROS:  CONSTITUTIONAL: NEGATIVE for fever, chills, change in weight  INTEGUMENTARY/SKIN: NEGATIVE for worrisome rashes, moles or lesions  INTEGUMENTARY/SKIN: POSITIVE for a rough spot on the back of her calf.  Hx of skin CA needs to be checked   EYES: NEGATIVE for vision changes or irritation  ENT: NEGATIVE for ear, mouth and throat problems  RESP: NEGATIVE for significant cough or SOB  BREAST: NEGATIVE for masses, tenderness or discharge  CV: NEGATIVE for chest pain, palpitations or peripheral edema  GI: NEGATIVE for nausea, abdominal pain, heartburn, or change in bowel habits  : NEGATIVE for unusual urinary or vaginal symptoms. No vaginal bleeding.  MUSCULOSKELETAL: NEGATIVE for significant arthralgias or myalgia  NEURO: NEGATIVE for weakness, dizziness or paresthesias  ENDOCRINE: NEGATIVE for temperature intolerance, skin/hair changes, POSITIVE for feeling tired.   HEME/ALLERGY/IMMUNE: NEGATIVE for bleeding problems  PSYCHIATRIC: NEGATIVE for changes in mood or affect     OBJECTIVE:   /58 (BP Location: Left arm, Patient Position: Chair, Cuff Size: Adult Regular)  Pulse 76  Temp 97.4  F (36.3  C) (Tympanic)  Resp 14  Ht 5' 5.35\" (1.66 m)  Wt 139 lb 9.6 oz (63.3 kg)  BMI 22.98 kg/m2  EXAM:  GENERAL APPEARANCE: healthy, alert and no distress  EYES: Eyes grossly normal to inspection, PERRL and conjunctivae and sclerae normal  HENT: ear canals and TM's normal, nose and mouth without ulcers or lesions, oropharynx clear and oral mucous membranes moist  NECK: no adenopathy, no asymmetry, masses, or scars and thyroid normal to palpation  RESP: lungs clear to auscultation - no rales, rhonchi or wheezes  BREAST: normal without masses, tenderness or nipple discharge and no palpable axillary masses or adenopathy  CV: regular rate and rhythm, normal S1 S2, no S3 or S4, no murmur, " click or rub, no peripheral edema and peripheral pulses strong  ABDOMEN: soft, nontender, no hepatosplenomegaly, no masses and bowel sounds normal   (female): deferred  MS: no musculoskeletal defects are noted and gait is age appropriate without ataxia  SKIN: no suspicious lesions or rashes  NEURO: Normal strength and tone, sensory exam grossly normal, mentation intact and speech normal  PSYCH: mentation appears normal and affect normal/bright    Diagnostic Test Results:  Pending      ASSESSMENT/PLAN:     ASSESSMENT/PLAN:      ICD-10-CM    1. Routine general medical examination at a health care facility Z00.00 Comprehensive metabolic panel   2. CARDIOVASCULAR SCREENING; LDL GOAL LESS THAN 160 Z13.6 Lipid panel reflex to direct LDL Fasting     Comprehensive metabolic panel     CANCELED: Basic metabolic panel  (Ca, Cl, CO2, Creat, Gluc, K, Na, BUN)   3. Hypothyroidism, unspecified type E03.9 TSH     T3, Free     T3, total     T4, free   4. Screening for HIV (human immunodeficiency virus) Z11.4 **HIV Antigen Antibody Combo FUTURE anytime   5. History of skin cancer Z85.828 DERMATOLOGY REFERRAL   6. Vitamin D deficiency E55.9 Vitamin D Deficiency     Comprehensive metabolic panel   7. Fatigue, unspecified type R53.83 Comprehensive metabolic panel       Patient Instructions     Preventive Health Recommendations  Female Ages 50 - 64    Yearly exam: See your health care provider every year in order to  o Review health changes.   o Discuss preventive care.    o Review your medicines if your doctor has prescribed any.      Get a Pap test every three years (unless you have an abnormal result and your provider advises testing more often).    If you get Pap tests with HPV test, you only need to test every 5 years, unless you have an abnormal result.     You do not need a Pap test if your uterus was removed (hysterectomy) and you have not had cancer.    You should be tested each year for STDs (sexually transmitted diseases) if  "you're at risk.     Have a mammogram every 1 to 2 years.    Have a colonoscopy at age 50, or have a yearly FIT test (stool test). These exams screen for colon cancer.      Have a cholesterol test every 5 years, or more often if advised.    Have a diabetes test (fasting glucose) every three years. If you are at risk for diabetes, you should have this test more often.     If you are at risk for osteoporosis (brittle bone disease), think about having a bone density scan (DEXA).    Shots: Get a flu shot each year. Get a tetanus shot every 10 years.    Nutrition:     Eat at least 5 servings of fruits and vegetables each day.    Eat whole-grain bread, whole-wheat pasta and brown rice instead of white grains and rice.    Get adequate Calcium and Vitamin D.     Lifestyle    Exercise at least 150 minutes a week (30 minutes a day, 5 days a week). This will help you control your weight and prevent disease.    Limit alcohol to one drink per day.    No smoking.     Wear sunscreen to prevent skin cancer.     See your dentist every six months for an exam and cleaning.    See your eye doctor every 1 to 2 years.       for the foot pain .   Wear shoes/sandls in the house.    roll your foot over a ball,   Soak your feet in warm Epson salt water     Keep exercising     For the fatigue/tiredness  I did order  TSH  ( thyroid) and Vitamin D     Dermatology referral - call for appointment           COUNSELING:   Reviewed preventive health counseling, as reflected in patient instructions       Regular exercise       Healthy diet/nutrition       Vision screening       Hearing screening       Osteoporosis Prevention/Bone Health       Advance Care Planning    BP Readings from Last 1 Encounters:   08/30/18 104/58     Estimated body mass index is 22.98 kg/(m^2) as calculated from the following:    Height as of this encounter: 5' 5.35\" (1.66 m).    Weight as of this encounter: 139 lb 9.6 oz (63.3 kg).           reports that she has never smoked. " She has never used smokeless tobacco.      Counseling Resources:  ATP IV Guidelines  Pooled Cohorts Equation Calculator  Breast Cancer Risk Calculator  FRAX Risk Assessment  ICSI Preventive Guidelines  Dietary Guidelines for Americans, 2010  USDA's MyPlate  ASA Prophylaxis  Lung CA Screening    ADIN RODRIGUEZ NP, APRN CNP  [facility]

## 2018-08-30 NOTE — MR AVS SNAPSHOT
After Visit Summary   8/30/2018    Eleazar Cortez    MRN: [id number]           Patient Information     Date Of Birth          1955        Visit Information        Provider Department      8/30/2018 8:40 AM Kika Balbuena APRN [facility]        Today's Diagnoses     Routine general medical examination at a health care facility    -  1    CARDIOVASCULAR SCREENING; LDL GOAL LESS THAN 160        Hypothyroidism, unspecified type        Screening for HIV (human immunodeficiency virus)        History of skin cancer        Vitamin D deficiency        Fatigue, unspecified type          Care Instructions      Preventive Health Recommendations  Female Ages 50 - 64    Yearly exam: See your health care provider every year in order to  o Review health changes.   o Discuss preventive care.    o Review your medicines if your doctor has prescribed any.      Get a Pap test every three years (unless you have an abnormal result and your provider advises testing more often).    If you get Pap tests with HPV test, you only need to test every 5 years, unless you have an abnormal result.     You do not need a Pap test if your uterus was removed (hysterectomy) and you have not had cancer.    You should be tested each year for STDs (sexually transmitted diseases) if you're at risk.     Have a mammogram every 1 to 2 years.    Have a colonoscopy at age 50, or have a yearly FIT test (stool test). These exams screen for colon cancer.      Have a cholesterol test every 5 years, or more often if advised.    Have a diabetes test (fasting glucose) every three years. If you are at risk for diabetes, you should have this test more often.     If you are at risk for osteoporosis (brittle bone disease), think about having a bone density scan (DEXA).    Shots: Get a flu shot each year. Get a tetanus shot every 10 years.    Nutrition:     Eat at least 5 servings of fruits and vegetables each day.    Eat  whole-grain bread, whole-wheat pasta and brown rice instead of white grains and rice.    Get adequate Calcium and Vitamin D.     Lifestyle    Exercise at least 150 minutes a week (30 minutes a day, 5 days a week). This will help you control your weight and prevent disease.    Limit alcohol to one drink per day.    No smoking.     Wear sunscreen to prevent skin cancer.     See your dentist every six months for an exam and cleaning.    See your eye doctor every 1 to 2 years.       for the foot pain .   Wear shoes/sandls in the house.    roll your foot over a ball,   Soak your feet in warm Epson salt water     Keep exercising     For the fatigue/tiredness  I did order  TSH  ( thyroid) and Vitamin D     Dermatology referral - call for appointment                       Follow-ups after your visit        Additional Services     DERMATOLOGY REFERRAL       Your provider has referred you to: [facility]red[facility] (2 locations) [phone]   [email or web address]    Please be aware that coverage of these services is subject to the terms and limitations of your health insurance plan.  Call member services at your health plan with any benefit or coverage questions.      Please bring the following with you to your appointment:    (1) Any X-Rays, CTs or MRIs which have been performed.  Contact the facility where they were done to arrange for  prior to your scheduled appointment.    (2) List of current medications  (3) This referral request   (4) Any documents/labs given to you for this referral                  Who to contact     Normal or non-critical lab and imaging results will be communicated to you by MyChart, letter or phone within 4 business days after the clinic has received the results. If you do not hear from us within 7 days, please contact the clinic through MyChart or phone. If you have a critical or abnormal lab result, we will notify you by phone as soon as  "possible.  Submit refill requests through [employer] or call your pharmacy and they will forward the refill request to us. Please allow 3 business days for your refill to be completed.          If you need to speak with a  for additional information , please call: [phone]           Additional Information About Your Visit        [employer] Information     [employer] gives you secure access to your electronic health record. If you see a primary care provider, you can also send messages to your care team and make appointments. If you have questions, please call your primary care clinic.  If you do not have a primary care provider, please call [phone] and they will assist you.        Care EveryWhere ID     This is your Care EveryWhere ID. This could be used by other organizations to access your [facility] medical records  [id number]        Your Vitals Were     Pulse Temperature Respirations Height BMI (Body Mass Index)       76 97.4  F (36.3  C) (Tympanic) 14 5' 5.35\" (1.66 m) 22.98 kg/m2        Blood Pressure from Last 3 Encounters:   08/30/18 104/58   08/08/17 112/62   12/18/15 100/60    Weight from Last 3 Encounters:   08/30/18 139 lb 9.6 oz (63.3 kg)   08/08/17 139 lb 9.6 oz (63.3 kg)   12/18/15 126 lb (57.2 kg)              We Performed the Following     **HIV Antigen Antibody Combo FUTURE anytime     Comprehensive metabolic panel     DERMATOLOGY REFERRAL     Lipid panel reflex to direct LDL Fasting     TSH     Vitamin D Deficiency          Today's Medication Changes          These changes are accurate as of 8/30/18  9:21 AM.  If you have any questions, ask your nurse or doctor.               Stop taking these medicines if you haven't already. Please contact your care team if you have questions.     calcium + D 600-200 MG-UNIT Tabs   Generic drug:  calcium carbonate-vitamin D   Stopped by:  Kika Balbuena APRN CNP           CENTRY ADVANTAGE PO   Stopped by:  Kika Balbuena APRN " JULIANA           GARLIC   Stopped by:  Kika Balbuena APRN CNP           GINKGO BILOBA   Stopped by:  Kika Balbuena APRN CNP           Ginseng 100 MG Caps   Stopped by:  Kika Balbuena APRN CNP           GRAPE SEED COMPLEX PO   Stopped by:  Kika Balbuena APRN CNP           omega-3 fatty acids 1200 MG capsule   Stopped by:  Kika Balbuena APRN CNP           PROBIOTIC DAILY PO   Stopped by:  Kika Balbuena APRN CNP           SUPER B COMPLEX PO   Stopped by:  Kika Balbuena APRN CNP           UNABLE TO FIND   Stopped by:  Kika Balbuena APRN CNP           VITAMIN D3   Stopped by:  Kika Balbuena APRN CNP                    Primary Care Provider Office Phone # Fax #    SINGH Garcia [phone] [address] [place] [address]        Equal Access to Services     [facility]: Hadii abby ku hadasho Soomaali, waaxda luqadaha, qaybta kaalmada adeegyada, waxay shalomin hayiron gant . So [facility] [phone].    ATENCIÓN: Si habla español, tiene a chow disposición servicios gratuitos de asistencia lingüística. Ll[facility] [phone].    We comply with applicable federal civil rights laws and [place] laws. We do not discriminate on the basis of race, color, national origin, age, disability, sex, sexual orientation, or gender identity.            Thank you!     Thank you for choosing [facility]  for your care. Our goal is always to provide you with excellent care. Hearing back from our patients is one way we can continue to improve our services. Please take a few minutes to complete the written survey that you may receive in the mail after your visit with us. Thank you!             Your Updated Medication List - Protect others around you: Learn how to safely use, store and throw away your medicines at www.disposemymeds.org.          This list is accurate as of 8/30/18   9:21 AM.  Always use your most recent med list.                   Brand Name Dispense Instructions for use Diagnosis    CALCIUM + D PO           conjugated estrogens cream    PREMARIN    30 g    Place 0.5 g vaginally twice a week    Atrophic vaginitis       ibuprofen 200 MG tablet    ADVIL/MOTRIN     Take 800 mg by mouth every 8 hours as needed.        levothyroxine 50 MCG tablet    SYNTHROID/LEVOTHROID    30 tablet    Take 1 tablet (50 mcg) by mouth daily DUE FOR OFFICE VISIT AND LABS    Hypothyroidism, unspecified type       UNABLE TO FIND      MEDICATION NAME: Melaleuca PM - Longevity 2 CardiOmegaEPA (Omega 3 Fishoils - 1000 mg EPG and 100 mg DHA) 2 Vitality Multivitamin and Mineral (Iron, Vitamin K, Folic acid, Biotin, Calcium) 2 CellWise Antioxidant (Olive extract, grape seed extract, Vitamin C, carotenoids) 2 ProveXCV (Blood pressure support) 2 RecoverAl (Inflammation) 1 Florify (acid resistance, probiotic) -1 Vitality Left Hand (omega 3, Brain eye and heart) -2 Acuity AZ (Brain health) -1 K2-03 (Redirects calcium bone health) -1Replenex Extra strength  -1 Nutraview (Vision- macular/lens health) 1 CoQ10 +Cellular Energy Support (antioxidants)

## 2018-08-31 LAB
DEPRECATED CALCIDIOL+CALCIFEROL SERPL-MC: 66 UG/L (ref 20–75)
HIV 1+2 AB+HIV1 P24 AG SERPL QL IA: NONREACTIVE

## 2018-09-24 DIAGNOSIS — E03.9 HYPOTHYROIDISM, UNSPECIFIED TYPE: ICD-10-CM

## 2018-09-24 NOTE — TELEPHONE ENCOUNTER
"Requested Prescriptions   Pending Prescriptions Disp Refills     levothyroxine (SYNTHROID/LEVOTHROID) 50 MCG tablet [Pharmacy Med Name: LEVOTHYROXINE 50 MCG TABLET] 30 tablet 0    Last Written Prescription Date:  08/21/2018 #30 x 0  Last filled 08/21/2018  Last office visit: 8/30/2018 RENAY Balbuena   Future Office Visit: None    Sig: TAKE 1 TABLET BY MOUTH DAILY    Thyroid Protocol Passed    9/24/2018 10:43 AM       Passed - Patient is 12 years or older       Passed - Recent (12 mo) or future (30 days) visit within the authorizing provider's specialty    Patient had office visit in the last 12 months or has a visit in the next 30 days with authorizing provider or within the authorizing provider's specialty.  See \"Patient Info\" tab in inbasket, or \"Choose Columns\" in Meds & Orders section of the refill encounter.           Passed - Normal TSH on file in past 12 months    Recent Labs   Lab Test  08/30/18   0926   TSH  1.64             Passed - No active pregnancy on record    If patient is pregnant or has had a positive pregnancy test, please check TSH.         Passed - No positive pregnancy test in past 12 months    If patient is pregnant or has had a positive pregnancy test, please check TSH.            "

## 2018-09-25 RX ORDER — LEVOTHYROXINE SODIUM 50 UG/1
TABLET ORAL
Qty: 90 TABLET | Refills: 1 | Status: SHIPPED | OUTPATIENT
Start: 2018-09-25 | End: 2019-04-03

## 2019-04-03 DIAGNOSIS — E03.9 HYPOTHYROIDISM, UNSPECIFIED TYPE: ICD-10-CM

## 2019-04-03 RX ORDER — LEVOTHYROXINE SODIUM 50 UG/1
TABLET ORAL
Qty: 90 TABLET | Refills: 1 | Status: SHIPPED | OUTPATIENT
Start: 2019-04-03 | End: 2019-10-07

## 2019-04-03 NOTE — TELEPHONE ENCOUNTER
TSH   Date Value Ref Range Status   08/30/2018 1.64 0.40 - 4.00 mU/L Final       Prescription approved per [facility] Refill Protocol or patient Primary care provider (PCP)  ROBERT Chiu  RN/Andrey Grady

## 2019-04-03 NOTE — TELEPHONE ENCOUNTER
"Requested Prescriptions   Pending Prescriptions Disp Refills     levothyroxine (SYNTHROID/LEVOTHROID) 50 MCG tablet [Pharmacy Med Name: LEVOTHYROXINE 50 MCG TABLET] 90 tablet 1    Last Written Prescription Date:  9/25/18  Last Fill Quantity: 90,  # refills: 1   Last office visit: 8/30/2018 with prescribing provider:  amrita   Future Office Visit:     Sig: TAKE 1 TABLET BY MOUTH EVERY DAY    Thyroid Protocol Passed - 4/3/2019 11:02 AM       Passed - Patient is 12 years or older       Passed - Recent (12 mo) or future (30 days) visit within the authorizing provider's specialty    Patient had office visit in the last 12 months or has a visit in the next 30 days with authorizing provider or within the authorizing provider's specialty.  See \"Patient Info\" tab in inbasket, or \"Choose Columns\" in Meds & Orders section of the refill encounter.             Passed - Medication is active on med list       Passed - Normal TSH on file in past 12 months    Recent Labs   Lab Test 08/30/18  0926   TSH 1.64             Passed - No active pregnancy on record    If patient is pregnant or has had a positive pregnancy test, please check TSH.         Passed - No positive pregnancy test in past 12 months    If patient is pregnant or has had a positive pregnancy test, please check TSH.            "

## 2019-11-03 ENCOUNTER — HEALTH MAINTENANCE LETTER (OUTPATIENT)
Age: 64
End: 2019-11-03

## 2019-11-22 DIAGNOSIS — E03.9 HYPOTHYROIDISM, UNSPECIFIED TYPE: ICD-10-CM

## 2019-11-22 NOTE — TELEPHONE ENCOUNTER
"Requested Prescriptions   Pending Prescriptions Disp Refills     levothyroxine (SYNTHROID/LEVOTHROID) 50 MCG tablet [Pharmacy Med Name: LEVOTHYROXINE 50 MCG TABLET] 30 tablet 0     Sig: TAKE 1 TABLET BY MOUTH EVERY DAY  Last Written Prescription Date:  10/09/2019 #30 x 0  Last filled 10/24/2019  Last office visit: 8/30/2018 RENAY Balbuena   Future Office Visit:  None         Thyroid Protocol Failed - 11/22/2019  4:02 AM        Failed - Recent (12 mo) or future (30 days) visit within the authorizing provider's specialty     Patient has had an office visit with the authorizing provider or a provider within the authorizing providers department within the previous 12 mos or has a future within next 30 days. See \"Patient Info\" tab in inbasket, or \"Choose Columns\" in Meds & Orders section of the refill encounter.              Failed - Normal TSH on file in past 12 months     Recent Labs   Lab Test 08/30/18  0926   TSH 1.64              Passed - Patient is 12 years or older        Passed - Medication is active on med list        Passed - No active pregnancy on record     If patient is pregnant or has had a positive pregnancy test, please check TSH.          Passed - No positive pregnancy test in past 12 months     If patient is pregnant or has had a positive pregnancy test, please check TSH.            "

## 2019-11-25 NOTE — TELEPHONE ENCOUNTER
Routing refill request to provider for review/approval because:  Last OV 8.30.18, needs current TSH lab  Carolyne Neves RN

## 2019-11-26 RX ORDER — LEVOTHYROXINE SODIUM 50 UG/1
50 TABLET ORAL DAILY
Qty: 30 TABLET | Refills: 0 | Status: SHIPPED | OUTPATIENT
Start: 2019-11-26 | End: 2020-01-08

## 2020-01-06 DIAGNOSIS — E03.9 HYPOTHYROIDISM, UNSPECIFIED TYPE: ICD-10-CM

## 2020-01-07 NOTE — TELEPHONE ENCOUNTER
"Requested Prescriptions   Pending Prescriptions Disp Refills     levothyroxine (SYNTHROID/LEVOTHROID) 50 MCG tablet [Pharmacy Med Name: LEVOTHYROXINE 50 MCG TABLET] 30 tablet 0     Sig: TAKE 1 TABLET (50 MCG) BY MOUTH DAILY DUE FOR LABS  Last Written Prescription Date:  11/26/2019 #30 x 0  Last filled 11/26/2019  Last office visit: 8/30/2018 RENAY Balbuena   Future Office Visit:  None         Thyroid Protocol Failed - 1/6/2020  1:47 AM        Failed - Recent (12 mo) or future (30 days) visit within the authorizing provider's specialty     Patient has had an office visit with the authorizing provider or a provider within the authorizing providers department within the previous 12 mos or has a future within next 30 days. See \"Patient Info\" tab in inbasket, or \"Choose Columns\" in Meds & Orders section of the refill encounter.              Failed - Normal TSH on file in past 12 months     Recent Labs   Lab Test 08/30/18  0926   TSH 1.64              Passed - Patient is 12 years or older        Passed - Medication is active on med list        Passed - No active pregnancy on record     If patient is pregnant or has had a positive pregnancy test, please check TSH.          Passed - No positive pregnancy test in past 12 months     If patient is pregnant or has had a positive pregnancy test, please check TSH.            "

## 2020-01-08 RX ORDER — LEVOTHYROXINE SODIUM 50 UG/1
50 TABLET ORAL DAILY
Qty: 15 TABLET | Refills: 0 | Status: SHIPPED | OUTPATIENT
Start: 2020-01-08 | End: 2020-01-27

## 2020-01-08 NOTE — TELEPHONE ENCOUNTER
Medication is being filled for 1 time refill only due to: 2 weeksonly  Over due for office visit and/or labs   ROBERT Chiu  RN/Andrey Grady

## 2020-01-27 ENCOUNTER — OFFICE VISIT (OUTPATIENT)
Dept: FAMILY MEDICINE | Facility: CLINIC | Age: 65
End: 2020-01-27

## 2020-01-27 VITALS
SYSTOLIC BLOOD PRESSURE: 112 MMHG | HEIGHT: 65 IN | HEART RATE: 76 BPM | TEMPERATURE: 99.1 F | RESPIRATION RATE: 12 BRPM | BODY MASS INDEX: 23.43 KG/M2 | DIASTOLIC BLOOD PRESSURE: 60 MMHG | WEIGHT: 140.6 LBS

## 2020-01-27 DIAGNOSIS — Z12.31 VISIT FOR SCREENING MAMMOGRAM: ICD-10-CM

## 2020-01-27 DIAGNOSIS — Z13.6 CARDIOVASCULAR SCREENING; LDL GOAL LESS THAN 160: ICD-10-CM

## 2020-01-27 DIAGNOSIS — Z00.00 ROUTINE GENERAL MEDICAL EXAMINATION AT A HEALTH CARE FACILITY: Primary | ICD-10-CM

## 2020-01-27 DIAGNOSIS — Z12.4 CERVICAL CANCER SCREENING: ICD-10-CM

## 2020-01-27 DIAGNOSIS — R20.0 NUMBNESS OF LEFT FOOT: ICD-10-CM

## 2020-01-27 DIAGNOSIS — Z11.51 SCREENING FOR HUMAN PAPILLOMAVIRUS: ICD-10-CM

## 2020-01-27 DIAGNOSIS — E03.9 HYPOTHYROIDISM, UNSPECIFIED TYPE: ICD-10-CM

## 2020-01-27 DIAGNOSIS — E55.9 VITAMIN D DEFICIENCY: ICD-10-CM

## 2020-01-27 LAB
ANION GAP SERPL CALCULATED.3IONS-SCNC: 3 MMOL/L (ref 3–14)
BUN SERPL-MCNC: 16 MG/DL (ref 7–30)
CALCIUM SERPL-MCNC: 9 MG/DL (ref 8.5–10.1)
CHLORIDE SERPL-SCNC: 105 MMOL/L (ref 94–109)
CHOLEST SERPL-MCNC: 234 MG/DL
CO2 SERPL-SCNC: 32 MMOL/L (ref 20–32)
CREAT SERPL-MCNC: 0.79 MG/DL (ref 0.52–1.04)
GFR SERPL CREATININE-BSD FRML MDRD: 79 ML/MIN/{1.73_M2}
GLUCOSE SERPL-MCNC: 103 MG/DL (ref 70–99)
HDLC SERPL-MCNC: 84 MG/DL
LDLC SERPL CALC-MCNC: 116 MG/DL
NONHDLC SERPL-MCNC: 150 MG/DL
POTASSIUM SERPL-SCNC: 3.6 MMOL/L (ref 3.4–5.3)
SODIUM SERPL-SCNC: 140 MMOL/L (ref 133–144)
TRIGL SERPL-MCNC: 169 MG/DL
TSH SERPL DL<=0.005 MIU/L-ACNC: 2.06 MU/L (ref 0.4–4)

## 2020-01-27 PROCEDURE — 80048 BASIC METABOLIC PNL TOTAL CA: CPT | Performed by: NURSE PRACTITIONER

## 2020-01-27 PROCEDURE — 80061 LIPID PANEL: CPT | Performed by: NURSE PRACTITIONER

## 2020-01-27 PROCEDURE — 99396 PREV VISIT EST AGE 40-64: CPT | Performed by: NURSE PRACTITIONER

## 2020-01-27 PROCEDURE — 36415 COLL VENOUS BLD VENIPUNCTURE: CPT | Performed by: NURSE PRACTITIONER

## 2020-01-27 PROCEDURE — 82306 VITAMIN D 25 HYDROXY: CPT | Performed by: NURSE PRACTITIONER

## 2020-01-27 PROCEDURE — 84443 ASSAY THYROID STIM HORMONE: CPT | Performed by: NURSE PRACTITIONER

## 2020-01-27 RX ORDER — LEVOTHYROXINE SODIUM 50 UG/1
50 TABLET ORAL DAILY
Qty: 90 TABLET | Refills: 3 | Status: SHIPPED | OUTPATIENT
Start: 2020-01-27 | End: 2021-01-22

## 2020-01-27 ASSESSMENT — ENCOUNTER SYMPTOMS
CONSTIPATION: 0
CHILLS: 0
BREAST MASS: 0
SHORTNESS OF BREATH: 0
HEADACHES: 0
HEMATURIA: 0
DIZZINESS: 0
DYSURIA: 0
FREQUENCY: 0
HEARTBURN: 0
DIARRHEA: 0
PARESTHESIAS: 0
SORE THROAT: 0
HEMATOCHEZIA: 0
ARTHRALGIAS: 0
ABDOMINAL PAIN: 0
WEAKNESS: 0
COUGH: 1
MYALGIAS: 0
NERVOUS/ANXIOUS: 0
NAUSEA: 0
FEVER: 0
EYE PAIN: 0
PALPITATIONS: 0
JOINT SWELLING: 0

## 2020-01-27 ASSESSMENT — MIFFLIN-ST. JEOR: SCORE: 1188.63

## 2020-01-27 ASSESSMENT — PAIN SCALES - GENERAL: PAINLEVEL: NO PAIN (0)

## 2020-01-27 NOTE — PATIENT INSTRUCTIONS
Preventive Health Recommendations  Female Ages 50 - 64    Yearly exam: See your health care provider every year in order to  o Review health changes.   o Discuss preventive care.    o Review your medicines if your doctor has prescribed any.      Get a Pap test every three years (unless you have an abnormal result and your provider advises testing more often).    If you get Pap tests with HPV test, you only need to test every 5 years, unless you have an abnormal result.     You do not need a Pap test if your uterus was removed (hysterectomy) and you have not had cancer.    You should be tested each year for STDs (sexually transmitted diseases) if you're at risk.     Have a mammogram every 1 to 2 years.    Have a colonoscopy at age 50, or have a yearly FIT test (stool test). These exams screen for colon cancer.      Have a cholesterol test every 5 years, or more often if advised.    Have a diabetes test (fasting glucose) every three years. If you are at risk for diabetes, you should have this test more often.     If you are at risk for osteoporosis (brittle bone disease), think about having a bone density scan (DEXA).    Shots: Get a flu shot each year. Get a tetanus shot every 10 years.    Nutrition:     Eat at least 5 servings of fruits and vegetables each day.    Eat whole-grain bread, whole-wheat pasta and brown rice instead of white grains and rice.    Get adequate Calcium and Vitamin D.     Lifestyle    Exercise at least 150 minutes a week (30 minutes a day, 5 days a week). This will help you control your weight and prevent disease.    Limit alcohol to one drink per day.    No smoking.     Wear sunscreen to prevent skin cancer.     See your dentist every six months for an exam and cleaning.    See your eye doctor every 1 to 2 years.      You are looking great       No change with medication     Sun screen !    You do have a referral to Podiatry      Mammogram referral  - mammogram's here.( Thursday)   Or you  can go to [facility]

## 2020-01-27 NOTE — NURSING NOTE
"Initial /60 (BP Location: Left arm, Patient Position: Chair, Cuff Size: Adult Regular)   Pulse 76   Temp 99.1  F (37.3  C) (Tympanic)   Resp 12   Ht 1.643 m (5' 4.69\")   Wt 63.8 kg (140 lb 9.6 oz)   BMI 23.63 kg/m   Estimated body mass index is 23.63 kg/m  as calculated from the following:    Height as of this encounter: 1.643 m (5' 4.69\").    Weight as of this encounter: 63.8 kg (140 lb 9.6 oz). .    Brisa Mercer CMA ([facility])    "

## 2020-01-27 NOTE — PROGRESS NOTES
SUBJECTIVE:   CC: Eleazar Cortez is an 64 year old woman who presents for preventive health visit.     Healthy Habits:     Getting at least 3 servings of Calcium per day:  Yes    Bi-annual eye exam:  Yes    Dental care twice a year:  Yes    Sleep apnea or symptoms of sleep apnea:  None    Diet:  Regular (no restrictions)    Frequency of exercise:  2-3 days/week    Duration of exercise:  Other    Taking medications regularly:  Yes    Medication side effects:  Not applicable    PHQ-2 Total Score: 0    Additional concerns today:  Yes    *Did have spot on back of left calf that she had scraped and it was basal cell carcinoma.    *Is not fasting.    *Had been given a referral to Dr. Hubbard a few years ago and did not like him, is hoping to get a referral to another podiatrist for left foot numbness and uncomfortable feeling that does keep her awake at night.    *URI - patient is flying to [place] on 02/09. Has been dealing with sinus issues for the past week; PND, headaches, pressure, congestion. Does feel that these are improving but is concerned about flying with the symptoms.    Today's PHQ-2 Score:   PHQ-2 ( 1999 Pf[employer]) 1/27/2020   Q1: Little interest or pleasure in doing things 0   Q2: Feeling down, depressed or hopeless 0   PHQ-2 Score 0   Q1: Little interest or pleasure in doing things Not at all   Q2: Feeling down, depressed or hopeless Not at all   PHQ-2 Score 0     Abuse: Current or Past(Physical, Sexual or Emotional)- No  Do you feel safe in your environment? Yes    Have you ever done Advance Care Planning? (For example, a Health Directive, POLST, or a discussion with a medical provider or your loved ones about your wishes): No, advance care planning information given to patient to review.  Patient declined advance care planning discussion at this time.    Social History     Tobacco Use     Smoking status: Never Smoker     Smokeless tobacco: Never Used   Substance Use Topics     Alcohol use: No     If you drink  alcohol do you typically have >3 drinks per day or >7 drinks per week? No    Alcohol Use 1/27/2020   Prescreen: >3 drinks/day or >7 drinks/week? Not Applicable   Prescreen: >3 drinks/day or >7 drinks/week? -       Reviewed orders with patient.  Reviewed health maintenance and updated orders accordingly - Yes  Labs reviewed in EPIC    Mammogram Screening: Patient over age 50, mutual decision to screen reflected in health maintenance.    Pertinent mammograms are reviewed under the imaging tab.  History of abnormal Pap smear: NO - age 30- 65 PAP every 3 years recommended  PAP / HPV Latest Ref Rng & Units 8/8/2017 5/9/2014 10/11/2012   PAP - NIL NIL NIL   HPV 16 DNA NEG:Negative Negative - -   HPV 18 DNA NEG:Negative Negative - -   OTHER HR HPV NEG:Negative Negative - -     Reviewed and updated as needed this visit by clinical staff  Tobacco  Allergies  Meds  Med Hx  Surg Hx  Fam Hx  Soc Hx        Reviewed and updated as needed this visit by Provider  Tobacco  Allergies  Meds  Med Hx  Surg Hx  Fam Hx  Soc Hx           Review of Systems   Constitutional: Negative for chills and fever.   HENT: Positive for congestion. Negative for ear pain, hearing loss and sore throat.    Eyes: Negative for pain and visual disturbance.   Respiratory: Positive for cough. Negative for shortness of breath.    Cardiovascular: Negative for chest pain, palpitations and peripheral edema.   Gastrointestinal: Negative for abdominal pain, constipation, diarrhea, heartburn, hematochezia and nausea.   Breasts:  Negative for tenderness, breast mass and discharge.   Genitourinary: Negative for dysuria, frequency, genital sores, hematuria, pelvic pain, urgency, vaginal bleeding and vaginal discharge.   Musculoskeletal: Negative for arthralgias, joint swelling and myalgias.   Skin: Negative for rash.   Neurological: Negative for dizziness, weakness, headaches and paresthesias.   Psychiatric/Behavioral: Negative for mood changes. The patient  "is not nervous/anxious.      Skin  Did have a skin cancer removed from the left calf   Does have   OBJECTIVE:   /60 (BP Location: Left arm, Patient Position: Chair, Cuff Size: Adult Regular)   Pulse 76   Temp 99.1  F (37.3  C) (Tympanic)   Resp 12   Ht 1.643 m (5' 4.69\")   Wt 63.8 kg (140 lb 9.6 oz)   BMI 23.63 kg/m    Physical Exam  GENERAL APPEARANCE: healthy, alert and no distress  EYES: Eyes grossly normal to inspection, PERRL and conjunctivae and sclerae normal  HENT: ear canals and TM's normal, nose and mouth without ulcers or lesions, oropharynx clear and oral mucous membranes moist  NECK: no adenopathy, no asymmetry, masses, or scars and thyroid normal to palpation  RESP: lungs clear to auscultation - no rales, rhonchi or wheezes  BREAST: normal without masses, tenderness or nipple discharge and no palpable axillary masses or adenopathy  CV: regular rate and rhythm, normal S1 S2, no S3 or S4, no murmur, click or rub, no peripheral edema and peripheral pulses strong  ABDOMEN: soft, nontender, no hepatosplenomegaly, no masses and bowel sounds normal   (female): deferred  Post menopausal   MS: no musculoskeletal defects are noted and gait is age appropriate without ataxia  SKIN: no suspicious lesions or rashes  NEURO: Normal strength and tone, sensory exam grossly normal, mentation intact and speech normal  PSYCH: mentation appears normal and affect normal/bright    Diagnostic Test Results:  Labs reviewed in Epic  Pending     ASSESSMENT/PLAN:     ASSESSMENT/PLAN:      ICD-10-CM    1. Routine general medical examination at a health care facility Z00.00 Basic metabolic panel  (Ca, Cl, CO2, Creat, Gluc, K, Na, BUN)   2. Hypothyroidism, unspecified type E03.9 levothyroxine (SYNTHROID/LEVOTHROID) 50 MCG tablet     TSH   3. Cervical cancer screening Z12.4    4. Screening for human papillomavirus Z11.51    5. Numbness of left foot R20.8 PODIATRY/FOOT & ANKLE SURGERY REFERRAL   6. CARDIOVASCULAR " SCREENING; LDL GOAL LESS THAN 160 Z13.6 Lipid panel reflex to direct LDL Fasting   7. Vitamin D deficiency E55.9 Vitamin D Deficiency   8. Visit for screening mammogram Z12.31 MA Diagnostic Digital Bilateral       Patient Instructions     Preventive Health Recommendations  Female Ages 50 - 64    Yearly exam: See your health care provider every year in order to  o Review health changes.   o Discuss preventive care.    o Review your medicines if your doctor has prescribed any.      Get a Pap test every three years (unless you have an abnormal result and your provider advises testing more often).    If you get Pap tests with HPV test, you only need to test every 5 years, unless you have an abnormal result.     You do not need a Pap test if your uterus was removed (hysterectomy) and you have not had cancer.    You should be tested each year for STDs (sexually transmitted diseases) if you're at risk.     Have a mammogram every 1 to 2 years.    Have a colonoscopy at age 50, or have a yearly FIT test (stool test). These exams screen for colon cancer.      Have a cholesterol test every 5 years, or more often if advised.    Have a diabetes test (fasting glucose) every three years. If you are at risk for diabetes, you should have this test more often.     If you are at risk for osteoporosis (brittle bone disease), think about having a bone density scan (DEXA).    Shots: Get a flu shot each year. Get a tetanus shot every 10 years.    Nutrition:     Eat at least 5 servings of fruits and vegetables each day.    Eat whole-grain bread, whole-wheat pasta and brown rice instead of white grains and rice.    Get adequate Calcium and Vitamin D.     Lifestyle    Exercise at least 150 minutes a week (30 minutes a day, 5 days a week). This will help you control your weight and prevent disease.    Limit alcohol to one drink per day.    No smoking.     Wear sunscreen to prevent skin cancer.     See your dentist every six months for an exam  "and cleaning.    See your eye doctor every 1 to 2 years.      You are looking great       No change with medication     Sun screen !    You do have a referral to Podiatry      Mammogram referral  - mammogram's here.( Thursday)   Or you can go to [facility] [place]               COUNSELING:  Reviewed preventive health counseling, as reflected in patient instructions       Regular exercise       Healthy diet/nutrition       Vision screening    Estimated body mass index is 23.63 kg/m  as calculated from the following:    Height as of this encounter: 1.643 m (5' 4.69\").    Weight as of this encounter: 63.8 kg (140 lb 9.6 oz).         reports that she has never smoked. She has never used smokeless tobacco.      Counseling Resources:  ATP IV Guidelines  Pooled Cohorts Equation Calculator  Breast Cancer Risk Calculator  FRAX Risk Assessment  ICSI Preventive Guidelines  Dietary Guidelines for Americans, 2010  USDA's MyPlate  ASA Prophylaxis  Lung CA Screening    ADIN RODRIGUEZ NP, APRN CNP  [facility]  "

## 2020-01-28 LAB — DEPRECATED CALCIDIOL+CALCIFEROL SERPL-MC: 61 UG/L (ref 20–75)

## 2020-02-04 ENCOUNTER — OFFICE VISIT (OUTPATIENT)
Dept: PODIATRY | Facility: CLINIC | Age: 65
End: 2020-02-04

## 2020-02-04 VITALS
DIASTOLIC BLOOD PRESSURE: 68 MMHG | WEIGHT: 140 LBS | SYSTOLIC BLOOD PRESSURE: 112 MMHG | BODY MASS INDEX: 23.52 KG/M2 | HEART RATE: 97 BPM

## 2020-02-04 DIAGNOSIS — D36.13 NEUROMA OF FOOT: Primary | ICD-10-CM

## 2020-02-04 PROCEDURE — 99243 OFF/OP CNSLTJ NEW/EST LOW 30: CPT | Performed by: PODIATRIST

## 2020-02-04 NOTE — PATIENT INSTRUCTIONS
Body mass index is 23.52 kg/m .      We wish you continued good healing. If you have any questions or concerns, please do not hesitate to contact us at [phone]    Please remember to call and schedule a follow up appointment if one was recommended at your earliest convenience.   PODIATRY CLINIC HOURS  TELEPHONE NUMBER    Dr. Rad Henao D.P.M [facility]    Clinics:  [facility]    Annmarie Martínez [facility]   Tuesday 1PM-6PM  [place]  Wednesday 7AM-2PM  [facility]  Thursday 10AM-6PM  [place]  Friday 7AM-3PM  [place]  Specialty schedulers:   [phone] to make an appointment with any Specialty Provider.        Urgent Care locations:    [facility] Monday-Friday 5 pm - 9 pm. Saturday-Sunday 9 am -5pm    Monday-Friday 11 am - 9 pm Saturday 9 am - 5 pm     Monday-Sunday 12 noon-8PM [phone]     If you need a medication refill, please contact us you may need lab work and/or a follow up visit prior to your refill (i.e. Antifungal medications).    Setup (secure e-mail communication and access to your chart) to send a message or to make an appointment.    If MRI needed please call [place] Farrell at [phone]

## 2020-02-04 NOTE — PROGRESS NOTES
Subjective:    Pt is seen today in consult from Kika Balbuena with a 6 year history of left foot numbness and some pain.  Describes as a burning or tingling sensation which is aggravated with weightbearing and relieved by rest.  Points to 3rd interspace. Also describes the feeling of the sock balled up in the end of the shoe.  Denies any history of trauma.  Feels better in good supportive sandals.  Has tried metatarsal pad.      ROS:  A 10-point review of systems was performed and is positive for that noted in the HPI and as seen above.  All other areas are negative.          Allergies   Allergen Reactions     Codeine Difficulty breathing     Also felt light headed.  After her arthroscopy 16-18 hour after surgery ( had been taking the Tylenol #3 ) did have an episode with lightheadedness and difficulty breathing/. Also felt like her neck has gotten stiff, couldn't move.  Lasting less than 5 min.   Has been able to take Tylenol since then without problems.       Dye [Contrast Dye] Other (See Comments)     congestion       Current Outpatient Medications   Medication Sig Dispense Refill     levothyroxine (SYNTHROID/LEVOTHROID) 50 MCG tablet Take 1 tablet (50 mcg) by mouth daily Due for labs 90 tablet 3     Calcium Citrate-Vitamin D (CALCIUM + D PO)        UNABLE TO FIND MEDICATION NAME: Melaleuca PM - Longevity  2 CardiOmegaEPA (Omega 3 Fishoils - 1000 mg EPG and 100 mg DHA)  2 Vitality Multivitamin and Mineral (Iron, Vitamin K, Folic acid, Biotin, Calcium)  2 CellWise Antioxidant (Olive extract, grape seed extract, Vitamin C, carotenoids)  2 ProveXCV (Blood pressure support)  2 RecoverAl (Inflammation)  1 Florify (acid resistance, probiotic)  -1 Vitality [place] (omega 3, Brain eye and heart)  -2 Acuity [place] (Brain health)  -1 K2-03 (Redirects calcium bone health)  -1Replenex Extra strength   -1 Nutraview (Vision- macular/lens health)  1 CoQ10 +Cellular Energy Support (antioxidants)         Patient Active Problem List  "  Diagnosis     Disorder of bone and cartilage     NONHODGKIN'S LYMPHOMA IN REMISSION     Benign neoplasm of colon     Atrophic vaginitis     Basal Cell Carcinoma of right lower medial Eyelid     Seborrheic Keratosis right leg     CARDIOVASCULAR SCREENING; LDL GOAL LESS THAN 160     Advanced directives, counseling/discussion     Colon polyp, hyperplastic     Environmental allergies     Hypothyroidism     Tear of lateral cartilage or meniscus of knee, current     Knee pain     Other postprocedural status(V45.89)     Shoulder pain, right       Past Medical History:   Diagnosis Date     Arthritis     in knee     BCC (basal cell carcinoma), face     right lateral nose.  removed via Mohs     Hypothyroidism 10/11/2012     Other malignant lymphomas, unspecified site, extranodal and solid organ sites      Scoliosis (and kyphoscoliosis), idiopathic      Uncomplicated asthma childhood    Rarely... haven't noticed for a long time       Past Surgical History:   Procedure Laterality Date     BIOPSY  , ?    leg, nose     C TOTAL KNEE ARTHROPLASTY Right 2015     COLONOSCOPY  2012    Procedure: COLONOSCOPY;  Colonoscopy;  Surgeon: Drew Perdue MD;  Location: [facility]     ORTHOPEDIC SURGERY  ,     broke radius by wrist... plate and 9 screws put in, arthrosc     SURGICAL HISTORY OF -   2001    Lymph Node Excision     SURGICAL HISTORY OF -       BCC removal right nasal area       Family History   Problem Relation Age of Onset     C.A.D. Father         heart problems at age 40     Arthritis Father         bilat knee replacements \"bowlegged\"     Coronary Artery Disease Father         heart attack 43 years ago     Asthma Father      Lipids Mother      Arthritis Mother         knees/fingers     Hypertension Mother      Other Cancer Mother         uterine cancer, 2016     Cardiovascular Paternal Grandfather      Coronary Artery Disease Paternal Grandfather          of heart attack     " Neurologic Disorder Maternal Grandfather         park     Diabetes Maternal Aunt      Osteoporosis Paternal Grandmother      Arthritis Son         spondylolysis     Coronary Artery Disease Other         Congestive heart failure     Asthma Other         paternal aunt, adult onset     Asthma Other         paternal aunt       Social History     Tobacco Use     Smoking status: Never Smoker     Smokeless tobacco: Never Used   Substance Use Topics     Alcohol use: No         Exam:    Vitals: /68 (BP Location: Right arm)   Pulse 97   Wt 63.5 kg (140 lb)   BMI 23.52 kg/m    BMI: Body mass index is 23.52 kg/m .  Height: Data Unavailable    Constitutional/ general:  Pt is in no apparent distress, appears well-nourished.  Cooperative with history and physical exam.     Psych:  The patient answered questions appropriately.  Normal affect.  Seems to have reasonable expectations, in terms of treatment.     Eyes:  Visual scanning/ tracking without deficit.     Ears:  Response to auditory stimuli is normal.  negative hearing aid devices.  Auricles in proper alignment.     Lymphatic:  Popliteal lymph nodes not enlarged.     Lungs:  Non labored breathing, non labored speech. No cough.  No audible wheezing. Even, quiet breathing.       Vascular:  positive pedal pulses bilaterally for both the DP and PT arteries.  CFT < 3 sec.  positive ankle edema.  positive pedal hair growth.    Neuro:  Alert and oriented x 3. Coordinated gait.  Light touch sensation is intact to the L4, L5, S1 distributions. No obvious deficits.  No evidence of neurological-based weakness, spasticity, or contracture in the lower extremities.      Derm: Normal texture and turgor.  No erythema, ecchymosis, or cyanosis.      Musculoskeletal:     Patient is ambulatory without an assistive device or brace.   Normal arch with weightbearing.  No forefoot or rear foot deformities noted.  MS 5/5 all compartments.  Normal ROM all fore foot and rearfoot joints.  No  equinus.   Pain upon palpation to the left third intermetatarsal space.    Positive ej's sign noted.  No erythema or subcutaneous masses noted.  No pain on the met heads or dorsal on the metatarsal necks.  Negative Tinnel's sign.      Assessment: Neuroma third Intermetatarsal Space left foot    Plan:  Disscussed etiology and treatment options at great detail.  Recommended changing shoewear to wide shoes, limiting periods of standing, and not going barefoot.  Good house shoes and made suggestions.  Outside shoes wide with good arch support.     Discussed other treatment options if this fails.  Discussed injections and surgery.  when painful, tylenol or advil.  Discussed nothing can be done for numbness.  RTC PRN.  Thank you for allowing me participate in the care of this patient.        Rad Henao DPM DPM, FACFAS

## 2020-02-04 NOTE — LETTER
2/4/2020         RE: Eleazar Cortez  [address] [facility] [id number]        Dear Colleague,    Thank you for referring your patient, Eleazar Cortez, to the [facility] [place]. Please see a copy of my visit note below.    Subjective:    Pt is seen today in consult from Kika Balbuena with a 6 year history of left foot numbness and some pain.  Describes as a burning or tingling sensation which is aggravated with weightbearing and relieved by rest.  Points to 3rd interspace. Also describes the feeling of the sock balled up in the end of the shoe.  Denies any history of trauma.  Feels better in good supportive sandals.  Has tried metatarsal pad.      ROS:  A 10-point review of systems was performed and is positive for that noted in the HPI and as seen above.  All other areas are negative.          Allergies   Allergen Reactions     Codeine Difficulty breathing     Also felt light headed.  After her arthroscopy 16-18 hour after surgery ( had been taking the Tylenol #3 ) did have an episode with lightheadedness and difficulty breathing/. Also felt like her neck has gotten stiff, couldn't move.  Lasting less than 5 min.   Has been able to take Tylenol since then without problems.       Dye [Contrast Dye] Other (See Comments)     congestion       Current Outpatient Medications   Medication Sig Dispense Refill     levothyroxine (SYNTHROID/LEVOTHROID) 50 MCG tablet Take 1 tablet (50 mcg) by mouth daily Due for labs 90 tablet 3     Calcium Citrate-Vitamin D (CALCIUM + D PO)        UNABLE TO FIND MEDICATION NAME: Melaleuca PM - Longevity  2 CardiOmegaEPA (Omega 3 Fishoils - 1000 mg EPG and 100 mg DHA)  2 Vitality Multivitamin and Mineral (Iron, Vitamin K, Folic acid, Biotin, Calcium)  2 CellWise Antioxidant (Olive extract, grape seed extract, Vitamin C, carotenoids)  2 ProveXCV (Blood pressure support)  2 RecoverAl (Inflammation)  1 Florify (acid resistance, probiotic)  -1 Vitality Brandon  "(omega 3, Brain eye and heart)  -2 Acuity [place] (Brain health)  -1 K2-03 (Redirects calcium bone health)  -1Replenex Extra strength   -1 Nutraview (Vision- macular/lens health)  1 CoQ10 +Cellular Energy Support (antioxidants)         Patient Active Problem List   Diagnosis     Disorder of bone and cartilage     NONHODGKIN'S LYMPHOMA IN REMISSION     Benign neoplasm of colon     Atrophic vaginitis     Basal Cell Carcinoma of right lower medial Eyelid     Seborrheic Keratosis right leg     CARDIOVASCULAR SCREENING; LDL GOAL LESS THAN 160     Advanced directives, counseling/discussion     Colon polyp, hyperplastic     Environmental allergies     Hypothyroidism     Tear of lateral cartilage or meniscus of knee, current     Knee pain     Other postprocedural status(V45.89)     Shoulder pain, right       Past Medical History:   Diagnosis Date     Arthritis 2014    in knee     BCC (basal cell carcinoma), face 2009    right lateral nose.  removed via Mohs     Hypothyroidism 10/11/2012     Other malignant lymphomas, unspecified site, extranodal and solid organ sites      Scoliosis (and kyphoscoliosis), idiopathic      Uncomplicated asthma childhood    Rarely... haven't noticed for a long time       Past Surgical History:   Procedure Laterality Date     BIOPSY  2002, 2011?    leg, nose     C TOTAL KNEE ARTHROPLASTY Right 03/2015     COLONOSCOPY  11/9/2012    Procedure: COLONOSCOPY;  Colonoscopy;  Surgeon: Drew Perdue MD;  Location: [facility]     ORTHOPEDIC SURGERY  2012, 2014    broke radius by wrist... plate and 9 screws put in, arthrosc     SURGICAL HISTORY OF -   12/2001    Lymph Node Excision     SURGICAL HISTORY OF -   1/09    BCC removal right nasal area       Family History   Problem Relation Age of Onset     C.A.D. Father         heart problems at age 40     Arthritis Father         bilat knee replacements \"bowlegged\"     Coronary Artery Disease Father         heart attack 43 years ago     Asthma Father      " Lipids Mother      Arthritis Mother         knees/fingers     Hypertension Mother      Other Cancer Mother         uterine cancer, 2016     Cardiovascular Paternal Grandfather      Coronary Artery Disease Paternal Grandfather          of heart attack     Neurologic Disorder Maternal Grandfather         park     Diabetes Maternal Aunt      Osteoporosis Paternal Grandmother      Arthritis Son         spondylolysis     Coronary Artery Disease Other         Congestive heart failure     Asthma Other         paternal aunt, adult onset     Asthma Other         paternal aunt       Social History     Tobacco Use     Smoking status: Never Smoker     Smokeless tobacco: Never Used   Substance Use Topics     Alcohol use: No         Exam:    Vitals: /68 (BP Location: Right arm)   Pulse 97   Wt 63.5 kg (140 lb)   BMI 23.52 kg/m     BMI: Body mass index is 23.52 kg/m .  Height: Data Unavailable    Constitutional/ general:  Pt is in no apparent distress, appears well-nourished.  Cooperative with history and physical exam.     Psych:  The patient answered questions appropriately.  Normal affect.  Seems to have reasonable expectations, in terms of treatment.     Eyes:  Visual scanning/ tracking without deficit.     Ears:  Response to auditory stimuli is normal.  negative hearing aid devices.  Auricles in proper alignment.     Lymphatic:  Popliteal lymph nodes not enlarged.     Lungs:  Non labored breathing, non labored speech. No cough.  No audible wheezing. Even, quiet breathing.       Vascular:  positive pedal pulses bilaterally for both the DP and PT arteries.  CFT < 3 sec.  positive ankle edema.  positive pedal hair growth.    Neuro:  Alert and oriented x 3. Coordinated gait.  Light touch sensation is intact to the L4, L5, S1 distributions. No obvious deficits.  No evidence of neurological-based weakness, spasticity, or contracture in the lower extremities.      Derm: Normal texture and turgor.  No erythema,  ecchymosis, or cyanosis.      Musculoskeletal:     Patient is ambulatory without an assistive device or brace.   Normal arch with weightbearing.  No forefoot or rear foot deformities noted.  MS 5/5 all compartments.  Normal ROM all fore foot and rearfoot joints.  No equinus.   Pain upon palpation to the left third intermetatarsal space.    Positive ej's sign noted.  No erythema or subcutaneous masses noted.  No pain on the met heads or dorsal on the metatarsal necks.  Negative Tinnel's sign.      Assessment: Neuroma third Intermetatarsal Space left foot    Plan:  Disscussed etiology and treatment options at great detail.  Recommended changing shoewear to wide shoes, limiting periods of standing, and not going barefoot.  Good house shoes and made suggestions.  Outside shoes wide with good arch support.     Discussed other treatment options if this fails.  Discussed injections and surgery.  when painful, tylenol or advil.  Discussed nothing can be done for numbness.  RTC PRN.  Thank you for allowing me participate in the care of this patient.        Rad Henao DPM DPM, FACFAS        Again, thank you for allowing me to participate in the care of your patient.        Sincerely,        Rad Henao DPM

## 2020-11-16 ENCOUNTER — HEALTH MAINTENANCE LETTER (OUTPATIENT)
Age: 65
End: 2020-11-16

## 2021-01-20 ENCOUNTER — TELEPHONE (OUTPATIENT)
Dept: FAMILY MEDICINE | Facility: CLINIC | Age: 66
End: 2021-01-20

## 2021-01-20 DIAGNOSIS — E03.9 HYPOTHYROIDISM, UNSPECIFIED TYPE: ICD-10-CM

## 2021-01-20 NOTE — TELEPHONE ENCOUNTER
LEVOTHYROXINE 50 MCG TABLET  Last Written Prescription Date:  1/27/20  Last Fill Quantity: 90,  # refills: 3   Last office visit: 1/27/2020 with prescribing provider:  julien   Future Office Visit:

## 2021-01-22 RX ORDER — LEVOTHYROXINE SODIUM 50 UG/1
50 TABLET ORAL DAILY
Refills: 0
Start: 2021-01-22

## 2021-01-22 RX ORDER — LEVOTHYROXINE SODIUM 50 UG/1
50 TABLET ORAL DAILY
Qty: 30 TABLET | Refills: 0 | Status: SHIPPED | OUTPATIENT
Start: 2021-01-22 | End: 2021-02-12

## 2021-01-22 NOTE — TELEPHONE ENCOUNTER
Please call patient, due for recheck for further refills, please assist patient in scheduling appt, (annual physical with new PCP)  Jenniffer refill given.       Cheryl Serrano PA-C

## 2021-01-22 NOTE — TELEPHONE ENCOUNTER
Routing refill request to provider for review/approval because:  Patient of Kika Balbuena who no longer works for [employer].  Will send to provider pool to advise.  Pended for #30 with needs to be seen reminder.    Lab Results   Component Value Date    TSH 2.06 01/27/2020

## 2021-02-09 ASSESSMENT — ENCOUNTER SYMPTOMS
ARTHRALGIAS: 0
ABDOMINAL PAIN: 0
HEMATURIA: 0
JOINT SWELLING: 0
CONSTIPATION: 0
SORE THROAT: 0
FEVER: 0
DIARRHEA: 0
WEAKNESS: 0
NAUSEA: 0
DIZZINESS: 0
PALPITATIONS: 0
HEARTBURN: 0
SHORTNESS OF BREATH: 0
HEADACHES: 0
NERVOUS/ANXIOUS: 0
FREQUENCY: 0
DYSURIA: 0
COUGH: 0
EYE PAIN: 0
MYALGIAS: 0
BREAST MASS: 0
PARESTHESIAS: 0
HEMATOCHEZIA: 0
CHILLS: 0

## 2021-02-09 NOTE — PATIENT INSTRUCTIONS
Please call [facility] Scheduling at [phone]  to set up the DEXA scan and Mammogram.     Start Boniva to help with osteoporosis and make sure you decrease your fall risk as much as possible (no throw rugs or cords to trip on, handles in bathroom, etc).       Preventive Health Recommendations  Female Ages 50 - 64    Yearly exam: See your health care provider every year in order to  o Review health changes.   o Discuss preventive care.    o Review your medicines if your doctor has prescribed any.      Get a Pap test every three years (unless you have an abnormal result and your provider advises testing more often).    If you get Pap tests with HPV test, you only need to test every 5 years, unless you have an abnormal result.     You do not need a Pap test if your uterus was removed (hysterectomy) and you have not had cancer.    You should be tested each year for STDs (sexually transmitted diseases) if you're at risk.     Have a mammogram every 1 to 2 years.    Have a colonoscopy at age 50, or have a yearly FIT test (stool test). These exams screen for colon cancer.      Have a cholesterol test every 5 years, or more often if advised.    Have a diabetes test (fasting glucose) every three years. If you are at risk for diabetes, you should have this test more often.     If you are at risk for osteoporosis (brittle bone disease), think about having a bone density scan (DEXA).    Shots: Get a flu shot each year. Get a tetanus shot every 10 years.    Nutrition:     Eat at least 5 servings of fruits and vegetables each day.    Eat whole-grain bread, whole-wheat pasta and brown rice instead of white grains and rice.    Get adequate Calcium and Vitamin D.     Lifestyle    Exercise at least 150 minutes a week (30 minutes a day, 5 days a week). This will help you control your weight and prevent disease.    Limit alcohol to one drink per day.    No smoking.     Wear sunscreen to prevent skin cancer.     See your  dentist every six months for an exam and cleaning.    See your eye doctor every 1 to 2 years.    Patient Education     What Is a Hiatal Hernia?    Hiatal hernia is when the area where the stomach and esophagus meet bulges up through the diaphragm into the chest cavity. In some cases, part of the stomach may bulge above the diaphragm. Stomach acid may move up into the esophagus and cause symptoms. The symptoms are often blamed on gastroesophageal reflux disease (GERD). You may only know about the hernia when it shows up on an X-ray taken for other reasons.    What you may feel  The hiatus is a normal hole in the diaphragm. The esophagus passes through this hole and leads to the stomach. In some cases, part of the stomach may bulge above the diaphragm. This bulge is called a hernia. Stomach acid may move up into the esophagus and cause symptoms.   When you eat, the muscle at the hiatus relaxes to allow food to pass into the stomach. It tightens again to keep food and digestive acids in the stomach.   Many people with hiatal hernias have mild symptoms. You may notice the following GERD symptoms:     Heartburn or other chest discomfort    A feeling of chest fullness after a meal    Frequent burping    Acid taste in the mouth    Trouble swallowing  Treating symptoms  If you have been diagnosed with hiatal hernia, these suggestions may help improve symptoms:     Lose excess weight. Extra weight puts pressure on the stomach and esophagus.    Don t lie down after eating. Sit up for at least an hour after eating. Lying down after eating can increase symptoms.    Don't have certain foods and drinks.  These include fatty foods, chocolate, coffee, mint, caffeine, and other foods that cause symptoms for you.    Don t smoke or drink alcohol. These can worsen symptoms.    Look at your medicines. Discuss your medicines with your healthcare provider. Many medicines can cause symptoms.    Consider an antacid medicine. Ask your  healthcare provider about over-the-counter and prescription medicines that may help.    Ask about surgery, if needed. Surgery is a treatment choice for some people. Your healthcare provider can determine if surgery is an option for you.    [employer] last reviewed this educational content on 6/1/2019 2000-2020 [employer]. [address] [place] [address], [place] [address]. All rights reserved. This information is not intended as a substitute for professional medical care. Always follow your healthcare professional's instructions.

## 2021-02-09 NOTE — PROGRESS NOTES
SUBJECTIVE:   CC: Eleazar Cortez is an 65 year old woman who presents for preventive health visit.       Patient has been advised of split billing requirements and indicates understanding: Yes  Healthy Habits:     Getting at least 3 servings of Calcium per day:  Yes    Bi-annual eye exam:  Yes    Dental care twice a year:  Yes    Sleep apnea or symptoms of sleep apnea:  None    Diet:  Regular (no restrictions)    Frequency of exercise:  2-3 days/week    Duration of exercise:  Less than 15 minutes    Taking medications regularly:  Yes    Medication side effects:  None    PHQ-2 Total Score: 0    Additional concerns today:  No          Patient was supposed to have MOHS done on spot on calf last year but did not have done. Possible derm referral.    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 Needs referral to go back.    Patient will get a pain at the top of her sternum after eating and it will last all night.  Feels an achy pain in epigastric region.  She has had her gallbladder checked years ago. This has been present for years.        She was on fosamax in the past.      No vaginal bleeding.      Today's PHQ-2 Score:   PHQ-2 ( 1999 Pfizer) 2/9/2021   Q1: Little interest or pleasure in doing things 0   Q2: Feeling down, depressed or hopeless 0   PHQ-2 Score 0   Q1: Little interest or pleasure in doing things Not at all   Q2: Feeling down, depressed or hopeless Not at all   PHQ-2 Score 0       Abuse: Current or Past (Physical, Sexual or Emotional) - No  Do you feel safe in your environment? Yes        Social History     Tobacco Use     Smoking status: Never Smoker     Smokeless tobacco: Never Used   Substance Use Topics     Alcohol use: No     If you drink alcohol do you typically have >3 drinks per day or >7 drinks per week? No    Alcohol Use 2/10/2021   Prescreen: >3 drinks/day or >7 drinks/week? -   Prescreen: >3 drinks/day or >7 drinks/week? No       Any new diagnosis of family breast, ovarian, or bowel cancer? No      Reviewed orders with patient.  Reviewed health maintenance and updated orders accordingly - Yes  Lab work is in process  Labs reviewed in EPIC  BP Readings from Last 3 Encounters:   02/10/21 116/68   02/04/20 112/68   01/27/20 112/60    Wt Readings from Last 3 Encounters:   02/10/21 63.6 kg (140 lb 3.2 oz)   02/04/20 63.5 kg (140 lb)   01/27/20 63.8 kg (140 lb 9.6 oz)                    Breast CA Risk Screening:  No flowsheet data found.      Mammogram Screening: Recommended mammography every 1-2 years with patient discussion and risk factor consideration  Pertinent mammograms are reviewed under the imaging tab.    History of abnormal Pap smear: NO - age 30-65 PAP every 5 years with negative HPV co-testing recommended  PAP / HPV Latest Ref Rng & Units 8/8/2017 5/9/2014 10/11/2012   PAP - NIL NIL NIL   HPV 16 DNA NEG:Negative Negative - -   HPV 18 DNA NEG:Negative Negative - -   OTHER HR HPV NEG:Negative Negative - -     Reviewed and updated as needed this visit by clinical staff  Tobacco  Allergies  Meds              Reviewed and updated as needed this visit by Provider                    Review of Systems   Constitutional: Negative for chills and fever.   HENT: Negative for congestion, ear pain, hearing loss and sore throat.    Eyes: Negative for pain and visual disturbance.   Respiratory: Negative for cough and shortness of breath.    Cardiovascular: Negative for chest pain, palpitations and peripheral edema.   Gastrointestinal: Negative for abdominal pain, constipation, diarrhea, heartburn, hematochezia and nausea.   Breasts:  Negative for tenderness, breast mass and discharge.   Genitourinary: Negative for dysuria, frequency, genital sores, hematuria, pelvic pain, urgency, vaginal bleeding and vaginal discharge.   Musculoskeletal: Negative for arthralgias, joint swelling and myalgias.   Skin: Negative for rash.   Neurological: Negative for dizziness, weakness, headaches and paresthesias.  "  Psychiatric/Behavioral: Negative for mood changes. The patient is not nervous/anxious.      CONSTITUTIONAL: NEGATIVE for fever, chills, change in weight  INTEGUMENTARY/SKIN: NEGATIVE for worrisome rashes, moles or lesions  EYES: NEGATIVE for vision changes or irritation  ENT: NEGATIVE for ear, mouth and throat problems  RESP: NEGATIVE for significant cough or SOB  BREAST: NEGATIVE for masses, tenderness or discharge  CV: NEGATIVE for chest pain, palpitations or peripheral edema  GI: NEGATIVE for nausea, abdominal pain, heartburn, or change in bowel habits  : NEGATIVE for unusual urinary or vaginal symptoms. No vaginal bleeding.  MUSCULOSKELETAL: NEGATIVE for significant arthralgias or myalgia  NEURO: NEGATIVE for weakness, dizziness or paresthesias  PSYCHIATRIC: NEGATIVE for changes in mood or affect      OBJECTIVE:   /68   Pulse 73   Temp 97.5  F (36.4  C) (Tympanic)   Ht 1.651 m (5' 5\")   Wt 63.6 kg (140 lb 3.2 oz)   SpO2 99%   BMI 23.33 kg/m    Physical Exam  GENERAL APPEARANCE: healthy, alert and no distress  EYES: Eyes grossly normal to inspection, PERRL and conjunctivae and sclerae normal  HENT: ear canals and TM's normal, nose and mouth without ulcers or lesions, oropharynx clear and oral mucous membranes moist  NECK: no adenopathy, no asymmetry, masses, or scars and thyroid normal to palpation  RESP: lungs clear to auscultation - no rales, rhonchi or wheezes  BREAST: normal without masses, tenderness or nipple discharge and no palpable axillary masses or adenopathy  CV: regular rate and rhythm, normal S1 S2, no S3 or S4, no murmur, click or rub, no peripheral edema and peripheral pulses strong  ABDOMEN: soft, nontender, no hepatosplenomegaly, no masses and bowel sounds normal   (female): normal female external genitalia, normal urethral meatus, vaginal mucosal atrophy noted, normal cervix, adnexae, and uterus without masses or abnormal discharge  MS: no musculoskeletal defects are noted and " gait is age appropriate without ataxia  SKIN: no suspicious lesions or rashes  NEURO: Normal strength and tone, sensory exam grossly normal, mentation intact and speech normal  PSYCH: mentation appears normal and affect normal/bright    Diagnostic Test Results:  Labs reviewed in Epic  Results for orders placed or performed in visit on 02/10/21   Lipid panel reflex to direct LDL Fasting     Status: Abnormal   Result Value Ref Range    Cholesterol 253 (H) <200 mg/dL    Triglycerides 109 <150 mg/dL    HDL Cholesterol 97 >49 mg/dL    LDL Cholesterol Calculated 134 (H) <100 mg/dL    Non HDL Cholesterol 156 (H) <130 mg/dL   TSH with free T4 reflex     Status: None   Result Value Ref Range    TSH 2.72 0.40 - 4.00 mU/L   Comprehensive metabolic panel (BMP + Alb, Alk Phos, ALT, AST, Total. Bili, TP)     Status: None   Result Value Ref Range    Sodium 138 133 - 144 mmol/L    Potassium 4.8 3.4 - 5.3 mmol/L    Chloride 105 94 - 109 mmol/L    Carbon Dioxide 28 20 - 32 mmol/L    Anion Gap 5 3 - 14 mmol/L    Glucose 88 70 - 99 mg/dL    Urea Nitrogen 17 7 - 30 mg/dL    Creatinine 0.85 0.52 - 1.04 mg/dL    GFR Estimate 73 >60 mL/min/[1.73_m2]    GFR Estimate If Black 84 >60 mL/min/[1.73_m2]    Calcium 8.9 8.5 - 10.1 mg/dL    Bilirubin Total 0.5 0.2 - 1.3 mg/dL    Albumin 3.9 3.4 - 5.0 g/dL    Protein Total 7.7 6.8 - 8.8 g/dL    Alkaline Phosphatase 51 40 - 150 U/L    ALT 19 0 - 50 U/L    AST 18 0 - 45 U/L   CBC with platelets     Status: None   Result Value Ref Range    WBC 4.8 4.0 - 11.0 10e9/L    RBC Count 4.20 3.8 - 5.2 10e12/L    Hemoglobin 12.9 11.7 - 15.7 g/dL    Hematocrit 39.9 35.0 - 47.0 %    MCV 95 78 - 100 fl    MCH 30.7 26.5 - 33.0 pg    MCHC 32.3 31.5 - 36.5 g/dL    RDW 12.8 10.0 - 15.0 %    Platelet Count 280 150 - 450 10e9/L   A pap thin layer screen     Status: None   Result Value Ref Range    PAP NIL     Copath Report         Patient Name: BRIGHT CALDERON  MR#: [id number]  Specimen #: [id number]  Collected:  2/10/2021  Received: 2/11/2021  Reported: 2/12/2021 08:44  Ordering Phy(s): CHAPITO PAEZ    For improved result formatting, select 'View Enhanced Report Format' under   Linked Documents section.    SPECIMEN/STAIN PROCESS:  Pap thin layer prep screening ([facility])       Pap-Cyto x 1, HPV ordered x 1    SOURCE: Cervical, endocervical  ----------------------------------------------------------------   Pap thin layer prep screening ([facility])  SPECIMEN ADEQUACY:  Satisfactory for evaluation.  Specimen was unable to be imaged on the   [facility] .  -Transformation zone component present.    CYTOLOGIC INTERPRETATION:    Negative for intraepithelial lesion or malignancy    Electronically signed out by:  JANE Link (ASCP)    CLINICAL HISTORY:    Post Menopausal, A previous normal pap  Date of Last Pap: 8/8/17,    Papanicolaou Test Limitations:  Cervical cytology is a screening test wi th   limited sensitivity; regular  screening is critical for cancer prevention; Pap tests are primarily   effective for the diagnosis/prevention of  squamous cell carcinoma, not adenocarcinomas or other cancers.    COLLECTION SITE:  Client:  [facility]  Location: [facility] ([facility])    The technical component of this testing was completed at the [facility], with the professional component performed   at the [facility], [address] [place] [id number] ([phone])           ASSESSMENT/PLAN:   1. Routine general medical examination at a health care facility       HEALTH CARE MAINTENANCE              Reviewed USPTF recommendations and anticipatory guidance.              See orders.   I discussed in detail with Eleazar Cortez the importance of cervical cancer screenings through pap smears, this likely will be her last Pap if normal.          2. Vitamin D  "deficiency  Currently on Vitamin D, discussed importance of this (especially given history of osteoporosis).     3. Visit for screening mammogram  I discussed in detail with Eleazar Cortez the importance of breast cancer screening through monthly self breast exams and annual breast exams in the clinic.  - MA Screening Digital Bilateral; Future    4. Cervical cancer screening  Likely will be her last pap.   - Pap imaged thin layer screen with HPV - recommended age 30 - 65 years (select HPV order below)  - HPV High Risk Types DNA Cervical    5. Hypothyroidism, unspecified type  Labs stable, refilled x 1 year.   - TSH with free T4 reflex  - Comprehensive metabolic panel (BMP + Alb, Alk Phos, ALT, AST, Total. Bili, TP)  - CBC with platelets    6. Osteoporosis without current pathological fracture, unspecified osteoporosis type  Discussed the importance of Calcium and vitamin D intake, weight bearing exercise, avoidance of smoking and bone density monitoring (if appropriate).    - IBANdronate (BONIVA) 150 MG tablet; Take 1 tablet (150 mg) by mouth every 30 days  Dispense: 3 tablet; Refill: 3  - DX Hip/Pelvis/Spine; Future    7. CARDIOVASCULAR SCREENING; LDL GOAL LESS THAN 160  Due for screening.   - Lipid panel reflex to direct LDL Fasting    8. Lymphoma in remission (H)  No longer f/b oncology.       Patient has been advised of split billing requirements and indicates understanding: Yes  COUNSELING:  Reviewed preventive health counseling, as reflected in patient instructions       Regular exercise       Healthy diet/nutrition       Osteoporosis prevention/bone health    Estimated body mass index is 23.33 kg/m  as calculated from the following:    Height as of this encounter: 1.651 m (5' 5\").    Weight as of this encounter: 63.6 kg (140 lb 3.2 oz).        She reports that she has never smoked. She has never used smokeless tobacco.      Counseling Resources:  ATP IV Guidelines  Pooled Cohorts Equation Calculator  Breast " Cancer Risk Calculator  BRCA-Related Cancer Risk Assessment: FHS-7 Tool  FRAX Risk Assessment  ICSI Preventive Guidelines  Dietary Guidelines for Americans, 2010  USDA's MyPlate  ASA Prophylaxis  Lung CA Screening    LEESA [facility]

## 2021-02-09 NOTE — PROGRESS NOTES
"   SUBJECTIVE:   CC: Eleazar Cortez is an 65 year old woman who presents for preventive health visit.     {Split Bill scripting  The purpose of this visit is to discuss your medical history and prevent health problems before you are sick. You may be responsible for a co-pay, coinsurance, or deductible if your visit today includes services such as checking on a sore throat, having an x-ray or lab test, or treating and evaluating a new or existing condition :869967}  Patient has been advised of split billing requirements and indicates understanding: {Yes and No:398656}  Healthy Habits:    Do you get at least three servings of calcium containing foods daily (dairy, green leafy vegetables, etc.)? { :695829::\"yes\"}    Amount of exercise or daily activities, outside of work: { :576666}    Problems taking medications regularly { :011223::\"No\"}    Medication side effects: { :206874::\"No\"}    Have you had an eye exam in the past two years? { :955601}    Do you see a dentist twice per year? { :597187}    Do you have sleep apnea, excessive snoring or daytime drowsiness?{ :407234}  {Outside tests to abstract? :925974}    {additional problems to add (Optional):217808}    Today's PHQ-2 Score:   PHQ-2 ( 1999 Pfizer) 2/9/2021 1/27/2020   Q1: Little interest or pleasure in doing things 0 0   Q2: Feeling down, depressed or hopeless 0 0   PHQ-2 Score 0 0   Q1: Little interest or pleasure in doing things Not at all Not at all   Q2: Feeling down, depressed or hopeless Not at all Not at all   PHQ-2 Score 0 0     {PHQ-2 LOOK IN ASSESSMENTS (Optional) :768099}  Abuse: Current or Past(Physical, Sexual or Emotional)- {YES/NO/NA:883078}  Do you feel safe in your environment? {YES/NO/NA:871573}        Social History     Tobacco Use     Smoking status: Never Smoker     Smokeless tobacco: Never Used   Substance Use Topics     Alcohol use: No     If you drink alcohol do you typically have >3 drinks per day or >7 drinks per week? {ETOH :784880}       " "              Reviewed orders with patient.  Reviewed health maintenance and updated orders accordingly - {Yes/No:187058::\"Yes\"}  {Chronicprobdata (Optional):295341}    Breast CA Risk Screening:  No flowsheet data found.  {Pull in link for FHS-7 answers if completed after opening note (Optional) :029717}  {If any of the questions to the BCRA (FHS-7) are answered yes, consider ordering referral for genetic counseling (Optional) :927069::\"click delete button to remove this line now\"}  {AMB Mammogram Decision Support (Optional) :026938}  Pertinent mammograms are reviewed under the imaging tab.    Pertinent mammograms are reviewed under the imaging tab.  History of abnormal Pap smear: {PAP HX:893422}  PAP / HPV Latest Ref Rng & Units 8/8/2017 5/9/2014 10/11/2012   PAP - NIL NIL NIL   HPV 16 DNA NEG:Negative Negative - -   HPV 18 DNA NEG:Negative Negative - -   OTHER HR HPV NEG:Negative Negative - -     Reviewed and updated as needed this visit by clinical staff                 Reviewed and updated as needed this visit by Provider                {HISTORY OPTIONS (Optional):268614}    ROS:  { :247505}    OBJECTIVE:   There were no vitals taken for this visit.  EXAM:  {Exam Choices:124042}    {Diagnostic Test Results (Optional):956679::\"Diagnostic Test Results:\",\"Labs reviewed in Epic\"}    ASSESSMENT/PLAN:   {Diag Picklist:914481}    Patient has been advised of split billing requirements and indicates understanding: {YES / NO:266926::\"Yes\"}  COUNSELING:   {FEMALE COUNSELING MESSAGES:309087::\"Reviewed preventive health counseling, as reflected in patient instructions\"}    Estimated body mass index is 23.52 kg/m  as calculated from the following:    Height as of 1/27/20: 1.643 m (5' 4.69\").    Weight as of 2/4/20: 63.5 kg (140 lb).    {Weight Management Plan (ACO) Complete if BMI is abnormal-  Ages 18-64  BMI >24.9.  Age 65+ with BMI <23 or >30 (Optional):323410}    She reports that she has never smoked. She has never used " smokeless tobacco.      Counseling Resources:  ATP IV Guidelines  Pooled Cohorts Equation Calculator  Breast Cancer Risk Calculator  BRCA-Related Cancer Risk Assessment: FHS-7 Tool  FRAX Risk Assessment  ICSI Preventive Guidelines  Dietary Guidelines for Americans, 2010  USDA's MyPlate  ASA Prophylaxis  Lung CA Screening    LEESA [facility] JACIEL

## 2021-02-10 ENCOUNTER — OFFICE VISIT (OUTPATIENT)
Dept: FAMILY MEDICINE | Facility: CLINIC | Age: 66
End: 2021-02-10

## 2021-02-10 VITALS
HEIGHT: 65 IN | HEART RATE: 73 BPM | SYSTOLIC BLOOD PRESSURE: 116 MMHG | WEIGHT: 140.2 LBS | TEMPERATURE: 97.5 F | DIASTOLIC BLOOD PRESSURE: 68 MMHG | BODY MASS INDEX: 23.36 KG/M2 | OXYGEN SATURATION: 99 %

## 2021-02-10 DIAGNOSIS — Z00.00 ROUTINE GENERAL MEDICAL EXAMINATION AT A HEALTH CARE FACILITY: Primary | ICD-10-CM

## 2021-02-10 DIAGNOSIS — Z13.6 CARDIOVASCULAR SCREENING; LDL GOAL LESS THAN 160: ICD-10-CM

## 2021-02-10 DIAGNOSIS — C85.9A LYMPHOMA IN REMISSION: ICD-10-CM

## 2021-02-10 DIAGNOSIS — E55.9 VITAMIN D DEFICIENCY: ICD-10-CM

## 2021-02-10 DIAGNOSIS — Z12.4 CERVICAL CANCER SCREENING: ICD-10-CM

## 2021-02-10 DIAGNOSIS — Z12.31 VISIT FOR SCREENING MAMMOGRAM: ICD-10-CM

## 2021-02-10 DIAGNOSIS — E03.9 HYPOTHYROIDISM, UNSPECIFIED TYPE: ICD-10-CM

## 2021-02-10 DIAGNOSIS — M81.0 OSTEOPOROSIS WITHOUT CURRENT PATHOLOGICAL FRACTURE, UNSPECIFIED OSTEOPOROSIS TYPE: ICD-10-CM

## 2021-02-10 PROBLEM — C44.91 SUPERFICIAL BASAL CELL CARCINOMA: Status: ACTIVE | Noted: 2020-01-15

## 2021-02-10 LAB
ALBUMIN SERPL-MCNC: 3.9 G/DL (ref 3.4–5)
ALP SERPL-CCNC: 51 U/L (ref 40–150)
ALT SERPL W P-5'-P-CCNC: 19 U/L (ref 0–50)
ANION GAP SERPL CALCULATED.3IONS-SCNC: 5 MMOL/L (ref 3–14)
AST SERPL W P-5'-P-CCNC: 18 U/L (ref 0–45)
BILIRUB SERPL-MCNC: 0.5 MG/DL (ref 0.2–1.3)
BUN SERPL-MCNC: 17 MG/DL (ref 7–30)
CALCIUM SERPL-MCNC: 8.9 MG/DL (ref 8.5–10.1)
CHLORIDE SERPL-SCNC: 105 MMOL/L (ref 94–109)
CHOLEST SERPL-MCNC: 253 MG/DL
CO2 SERPL-SCNC: 28 MMOL/L (ref 20–32)
CREAT SERPL-MCNC: 0.85 MG/DL (ref 0.52–1.04)
ERYTHROCYTE [DISTWIDTH] IN BLOOD BY AUTOMATED COUNT: 12.8 % (ref 10–15)
GFR SERPL CREATININE-BSD FRML MDRD: 73 ML/MIN/{1.73_M2}
GLUCOSE SERPL-MCNC: 88 MG/DL (ref 70–99)
HCT VFR BLD AUTO: 39.9 % (ref 35–47)
HDLC SERPL-MCNC: 97 MG/DL
HGB BLD-MCNC: 12.9 G/DL (ref 11.7–15.7)
LDLC SERPL CALC-MCNC: 134 MG/DL
MCH RBC QN AUTO: 30.7 PG (ref 26.5–33)
MCHC RBC AUTO-ENTMCNC: 32.3 G/DL (ref 31.5–36.5)
MCV RBC AUTO: 95 FL (ref 78–100)
NONHDLC SERPL-MCNC: 156 MG/DL
PLATELET # BLD AUTO: 280 10E9/L (ref 150–450)
POTASSIUM SERPL-SCNC: 4.8 MMOL/L (ref 3.4–5.3)
PROT SERPL-MCNC: 7.7 G/DL (ref 6.8–8.8)
RBC # BLD AUTO: 4.2 10E12/L (ref 3.8–5.2)
SODIUM SERPL-SCNC: 138 MMOL/L (ref 133–144)
TRIGL SERPL-MCNC: 109 MG/DL
TSH SERPL DL<=0.005 MIU/L-ACNC: 2.72 MU/L (ref 0.4–4)
WBC # BLD AUTO: 4.8 10E9/L (ref 4–11)

## 2021-02-10 PROCEDURE — 80053 COMPREHEN METABOLIC PANEL: CPT | Performed by: PHYSICIAN ASSISTANT

## 2021-02-10 PROCEDURE — G0123 SCREEN CERV/VAG THIN LAYER: HCPCS | Performed by: PHYSICIAN ASSISTANT

## 2021-02-10 PROCEDURE — 80061 LIPID PANEL: CPT | Performed by: PHYSICIAN ASSISTANT

## 2021-02-10 PROCEDURE — 85027 COMPLETE CBC AUTOMATED: CPT | Performed by: PHYSICIAN ASSISTANT

## 2021-02-10 PROCEDURE — 36415 COLL VENOUS BLD VENIPUNCTURE: CPT | Performed by: PHYSICIAN ASSISTANT

## 2021-02-10 PROCEDURE — 87624 HPV HI-RISK TYP POOLED RSLT: CPT | Performed by: PHYSICIAN ASSISTANT

## 2021-02-10 PROCEDURE — 84443 ASSAY THYROID STIM HORMONE: CPT | Performed by: PHYSICIAN ASSISTANT

## 2021-02-10 PROCEDURE — 99396 PREV VISIT EST AGE 40-64: CPT | Performed by: PHYSICIAN ASSISTANT

## 2021-02-10 PROCEDURE — 99214 OFFICE O/P EST MOD 30 MIN: CPT | Mod: 25 | Performed by: PHYSICIAN ASSISTANT

## 2021-02-10 RX ORDER — IBANDRONATE SODIUM 150 MG/1
150 TABLET, FILM COATED ORAL
Qty: 3 TABLET | Refills: 3 | Status: SHIPPED | OUTPATIENT
Start: 2021-02-10 | End: 2022-04-08

## 2021-02-10 ASSESSMENT — MIFFLIN-ST. JEOR: SCORE: 1186.82

## 2021-02-12 LAB
COPATH REPORT: NORMAL
PAP: NORMAL

## 2021-02-12 RX ORDER — LEVOTHYROXINE SODIUM 50 UG/1
50 TABLET ORAL DAILY
Qty: 90 TABLET | Refills: 3 | Status: SHIPPED | OUTPATIENT
Start: 2021-02-12 | End: 2022-04-07

## 2021-02-16 LAB
FINAL DIAGNOSIS: NORMAL
HPV HR 12 DNA CVX QL NAA+PROBE: NEGATIVE
HPV16 DNA SPEC QL NAA+PROBE: NEGATIVE
HPV18 DNA SPEC QL NAA+PROBE: NEGATIVE
SPECIMEN DESCRIPTION: NORMAL
SPECIMEN SOURCE CVX/VAG CYTO: NORMAL

## 2021-02-18 ENCOUNTER — HOSPITAL ENCOUNTER (OUTPATIENT)
Dept: MAMMOGRAPHY | Facility: CLINIC | Age: 66
End: 2021-02-18
Attending: PHYSICIAN ASSISTANT

## 2021-02-18 ENCOUNTER — HOSPITAL ENCOUNTER (OUTPATIENT)
Dept: BONE DENSITY | Facility: CLINIC | Age: 66
End: 2021-02-18
Attending: PHYSICIAN ASSISTANT

## 2021-02-18 DIAGNOSIS — Z12.31 VISIT FOR SCREENING MAMMOGRAM: ICD-10-CM

## 2021-02-18 DIAGNOSIS — M81.0 OSTEOPOROSIS WITHOUT CURRENT PATHOLOGICAL FRACTURE, UNSPECIFIED OSTEOPOROSIS TYPE: ICD-10-CM

## 2021-02-18 PROCEDURE — 77080 DXA BONE DENSITY AXIAL: CPT

## 2021-02-18 PROCEDURE — 77063 BREAST TOMOSYNTHESIS BI: CPT

## 2021-09-18 ENCOUNTER — HEALTH MAINTENANCE LETTER (OUTPATIENT)
Age: 66
End: 2021-09-18

## 2021-11-17 ENCOUNTER — TRANSFERRED RECORDS (OUTPATIENT)
Dept: HEALTH INFORMATION MANAGEMENT | Facility: CLINIC | Age: 66
End: 2021-11-17
Payer: COMMERCIAL

## 2021-12-07 ENCOUNTER — TRANSFERRED RECORDS (OUTPATIENT)
Dept: HEALTH INFORMATION MANAGEMENT | Facility: CLINIC | Age: 66
End: 2021-12-07
Payer: COMMERCIAL

## 2021-12-20 ENCOUNTER — TRANSFERRED RECORDS (OUTPATIENT)
Dept: HEALTH INFORMATION MANAGEMENT | Facility: CLINIC | Age: 66
End: 2021-12-20
Payer: COMMERCIAL

## 2022-01-10 ENCOUNTER — TRANSFERRED RECORDS (OUTPATIENT)
Dept: HEALTH INFORMATION MANAGEMENT | Facility: CLINIC | Age: 67
End: 2022-01-10
Payer: COMMERCIAL

## 2022-04-06 DIAGNOSIS — E03.9 HYPOTHYROIDISM, UNSPECIFIED TYPE: ICD-10-CM

## 2022-04-07 RX ORDER — LEVOTHYROXINE SODIUM 50 UG/1
50 TABLET ORAL DAILY
Qty: 90 TABLET | Refills: 0 | Status: SHIPPED | OUTPATIENT
Start: 2022-04-07 | End: 2022-07-11

## 2022-04-30 ENCOUNTER — HEALTH MAINTENANCE LETTER (OUTPATIENT)
Age: 67
End: 2022-04-30

## 2022-07-10 DIAGNOSIS — E03.9 HYPOTHYROIDISM, UNSPECIFIED TYPE: ICD-10-CM

## 2022-07-10 DIAGNOSIS — M81.0 OSTEOPOROSIS WITHOUT CURRENT PATHOLOGICAL FRACTURE, UNSPECIFIED OSTEOPOROSIS TYPE: ICD-10-CM

## 2022-07-11 NOTE — TELEPHONE ENCOUNTER
Routing refill request to provider for review/approval because:  Thyroid Protocol Failed 07/10/2022 07:48 AM   Protocol Details  Normal TSH on file in past 12 months       levothyroxine (SYNTHROID/LEVOTHROID) 50 MCG tablet 90 tablet 0 4/7/2022       Last office visit: 2/10/2021 with prescribing provider:  Cheryl Serrano   Future Office Visit:        Jenniffer refill already given.  Patient scheduled for appointment on 7/14/22.  Please advise on small refill until seen.      Lab Results   Component Value Date    TSH 2.72 02/10/2021

## 2022-07-12 ENCOUNTER — TRANSFERRED RECORDS (OUTPATIENT)
Dept: HEALTH INFORMATION MANAGEMENT | Facility: CLINIC | Age: 67
End: 2022-07-12

## 2022-07-13 ASSESSMENT — ENCOUNTER SYMPTOMS
WEAKNESS: 0
ABDOMINAL PAIN: 0
BREAST MASS: 0
HEMATOCHEZIA: 0
JOINT SWELLING: 1
EYE PAIN: 0
SHORTNESS OF BREATH: 0
HEARTBURN: 0
PARESTHESIAS: 0
CONSTIPATION: 0
NERVOUS/ANXIOUS: 0
HEADACHES: 0
MYALGIAS: 1
COUGH: 0
PALPITATIONS: 0
FEVER: 0
HEMATURIA: 0
NAUSEA: 0
SORE THROAT: 0
DIZZINESS: 0
DIARRHEA: 0
FREQUENCY: 0
DYSURIA: 0
ARTHRALGIAS: 0
CHILLS: 0

## 2022-07-13 ASSESSMENT — ACTIVITIES OF DAILY LIVING (ADL): CURRENT_FUNCTION: NO ASSISTANCE NEEDED

## 2022-07-14 ENCOUNTER — OFFICE VISIT (OUTPATIENT)
Dept: FAMILY MEDICINE | Facility: CLINIC | Age: 67
End: 2022-07-14
Payer: COMMERCIAL

## 2022-07-14 VITALS
RESPIRATION RATE: 16 BRPM | BODY MASS INDEX: 24.49 KG/M2 | HEIGHT: 65 IN | TEMPERATURE: 97.6 F | HEART RATE: 55 BPM | WEIGHT: 147 LBS | DIASTOLIC BLOOD PRESSURE: 73 MMHG | SYSTOLIC BLOOD PRESSURE: 116 MMHG | OXYGEN SATURATION: 100 %

## 2022-07-14 DIAGNOSIS — E03.9 HYPOTHYROIDISM, UNSPECIFIED TYPE: ICD-10-CM

## 2022-07-14 DIAGNOSIS — Z13.6 CARDIOVASCULAR SCREENING; LDL GOAL LESS THAN 160: ICD-10-CM

## 2022-07-14 DIAGNOSIS — Z23 ENCOUNTER FOR IMMUNIZATION: ICD-10-CM

## 2022-07-14 DIAGNOSIS — M81.0 OSTEOPOROSIS WITHOUT CURRENT PATHOLOGICAL FRACTURE, UNSPECIFIED OSTEOPOROSIS TYPE: ICD-10-CM

## 2022-07-14 DIAGNOSIS — Z00.00 ENCOUNTER FOR MEDICARE ANNUAL WELLNESS EXAM: Primary | ICD-10-CM

## 2022-07-14 DIAGNOSIS — C85.9A LYMPHOMA IN REMISSION: ICD-10-CM

## 2022-07-14 DIAGNOSIS — Z12.31 ENCOUNTER FOR SCREENING MAMMOGRAM FOR BREAST CANCER: ICD-10-CM

## 2022-07-14 DIAGNOSIS — Z12.11 COLON CANCER SCREENING: ICD-10-CM

## 2022-07-14 LAB
ERYTHROCYTE [DISTWIDTH] IN BLOOD BY AUTOMATED COUNT: 13.5 % (ref 10–15)
HCT VFR BLD AUTO: 38.6 % (ref 35–47)
HGB BLD-MCNC: 12.6 G/DL (ref 11.7–15.7)
MCH RBC QN AUTO: 31 PG (ref 26.5–33)
MCHC RBC AUTO-ENTMCNC: 32.6 G/DL (ref 31.5–36.5)
MCV RBC AUTO: 95 FL (ref 78–100)
PLATELET # BLD AUTO: 291 10E3/UL (ref 150–450)
RBC # BLD AUTO: 4.07 10E6/UL (ref 3.8–5.2)
WBC # BLD AUTO: 5.6 10E3/UL (ref 4–11)

## 2022-07-14 PROCEDURE — 36415 COLL VENOUS BLD VENIPUNCTURE: CPT | Performed by: PHYSICIAN ASSISTANT

## 2022-07-14 PROCEDURE — 80061 LIPID PANEL: CPT | Performed by: PHYSICIAN ASSISTANT

## 2022-07-14 PROCEDURE — 99214 OFFICE O/P EST MOD 30 MIN: CPT | Mod: 25 | Performed by: PHYSICIAN ASSISTANT

## 2022-07-14 PROCEDURE — 84443 ASSAY THYROID STIM HORMONE: CPT | Performed by: PHYSICIAN ASSISTANT

## 2022-07-14 PROCEDURE — G0402 INITIAL PREVENTIVE EXAM: HCPCS | Performed by: PHYSICIAN ASSISTANT

## 2022-07-14 PROCEDURE — 85027 COMPLETE CBC AUTOMATED: CPT | Performed by: PHYSICIAN ASSISTANT

## 2022-07-14 PROCEDURE — 90677 PCV20 VACCINE IM: CPT | Performed by: PHYSICIAN ASSISTANT

## 2022-07-14 PROCEDURE — 80053 COMPREHEN METABOLIC PANEL: CPT | Performed by: PHYSICIAN ASSISTANT

## 2022-07-14 PROCEDURE — G0009 ADMIN PNEUMOCOCCAL VACCINE: HCPCS | Performed by: PHYSICIAN ASSISTANT

## 2022-07-14 ASSESSMENT — ENCOUNTER SYMPTOMS
BREAST MASS: 0
FEVER: 0
CONSTIPATION: 0
HEMATOCHEZIA: 0
WEAKNESS: 0
EYE PAIN: 0
SORE THROAT: 0
NERVOUS/ANXIOUS: 0
HEADACHES: 0
JOINT SWELLING: 1
DIARRHEA: 0
PARESTHESIAS: 0
HEMATURIA: 0
DYSURIA: 0
ARTHRALGIAS: 0
MYALGIAS: 1
HEARTBURN: 0
NAUSEA: 0
PALPITATIONS: 0
CHILLS: 0
DIZZINESS: 0
ABDOMINAL PAIN: 0
FREQUENCY: 0
COUGH: 0
SHORTNESS OF BREATH: 0

## 2022-07-14 ASSESSMENT — PAIN SCALES - GENERAL: PAINLEVEL: NO PAIN (0)

## 2022-07-14 ASSESSMENT — ACTIVITIES OF DAILY LIVING (ADL): CURRENT_FUNCTION: NO ASSISTANCE NEEDED

## 2022-07-14 NOTE — PATIENT INSTRUCTIONS
Please call [phone] to schedule the mammogram        Preventive Health Recommendations    See your health care provider every year to    Review health changes.     Discuss preventive care.      Review your medicines if your doctor has prescribed any.      You no longer need a yearly Pap test unless you've had an abnormal Pap test in the past 10 years. If you have vaginal symptoms, such as bleeding or discharge, be sure to talk with your provider about a Pap test.      Every 1 to 2 years, have a mammogram.  If you are over 69, talk with your health care provider about whether or not you want to continue having screening mammograms.      Every 10 years, have a colonoscopy. Or, have a yearly FIT test (stool test). These exams will check for colon cancer.       Have a cholesterol test every 5 years, or more often if your doctor advises it.       Have a diabetes test (fasting glucose) every three years. If you are at risk for diabetes, you should have this test more often.       At age 65, have a bone density scan (DEXA) to check for osteoporosis (brittle bone disease).    Shots:    Get a flu shot each year.    Get a tetanus shot every 10 years.    Talk to your doctor about your pneumonia vaccines. There are now two you should receive - Pneumovax (PPSV 23) and Prevnar (PCV 13).    Talk to your pharmacist about the shingles vaccine.    Talk to your doctor about the hepatitis B vaccine.    Nutrition:     Eat at least 5 servings of fruits and vegetables each day.      Eat whole-grain bread, whole-wheat pasta and brown rice instead of white grains and rice.      Get adequate about Calcium and Vitamin D.     Lifestyle    Exercise at least 150 minutes a week (30 minutes a day, 5 days a week). This will help you control your weight and prevent disease.      Limit alcohol to one drink per day.      No smoking.       Wear sunscreen to prevent skin cancer.       See your dentist twice a year for an exam and cleaning.      See  your eye doctor every 1 to 2 years to screen for conditions such as glaucoma, macular degeneration, cataracts, etc.    Personalized Prevention Plan  You are due for the preventive services outlined below.  Your care team is available to assist you in scheduling these services.  If you have already completed any of these items, please share that information with your care team to update in your medical record.    Health Maintenance Due   Topic Date Due     ANNUAL REVIEW OF HM ORDERS  Never done     Pneumococcal Vaccine (2 - PPSV23 or PCV20) 08/22/2018     Annual Wellness Visit  02/10/2022     Patient Education   Personalized Prevention Plan  You are due for the preventive services outlined below.  Your care team is available to assist you in scheduling these services.  If you have already completed any of these items, please share that information with your care team to update in your medical record.  Health Maintenance Due   Topic Date Due     Pneumococcal Vaccine (2 - PPSV23 or PCV20) 08/22/2018     Mammogram  02/18/2022       Exercise for a Healthier Heart  You may wonder how you can improve the health of your heart. If you re thinking about exercise, you re on the right track. You don t need to become an athlete. But you do need a certain amount of brisk exercise to help strengthen your heart. If you have been diagnosed with a heart condition, your healthcare provider may advise exercise to help stabilize your condition. To help make exercise a habit, choose safe, fun activities.      Exercise with a friend. When activity is fun, you're more likely to stick with it.   Before you start  Check with your healthcare provider before starting an exercise program. This is especially important if you have not been active for a while. It's also important if you have a long-term (chronic) health problem such as heart disease, diabetes, or obesity. Or if you are at high risk for having these problems.   Why  exercise?  Exercising regularly offers many healthy rewards. It can help you do all of the following:     Improve your blood cholesterol level to help prevent further heart trouble    Lower your blood pressure to help prevent a stroke or heart attack    Control diabetes, or reduce your risk of getting this disease    Improve your heart and lung function    Reach and stay at a healthy weight    Make your muscles stronger so you can stay active    Prevent falls and fractures by slowing the loss of bone mass (osteoporosis)    Manage stress better    Reduce your blood pressure    Improve your sense of self and your body image  Exercise tips      Ease into your routine. Set small goals. Then build on them. If you are not sure what your activity level should be, talk with your healthcare provider first before starting an exercise routine.    Exercise on most days. Aim for a total of 150 minutes (2 hours and 30 minutes) or more of moderate-intensity aerobic activity each week. Or 75 minutes (1 hour and 15 minutes) or more of vigorous-intensity aerobic activity each week. Or try for a combination of both. Moderate activity means that you breathe heavier and your heart rate increases but you can still talk. Think about doing 40 minutes of moderate exercise, 3 to 4 times a week. For best results, activity should last for about 40 minutes to lower blood pressure and cholesterol. It's OK to work up to the 40-minute period over time. Examples of moderate-intensity activity are walking 1 mile in 15 minutes. Or doing 30 to 45 minutes of yard work.    Step up your daily activity level.  Along with your exercise program, try being more active the whole day. Walk instead of drive. Or park further away so that you take more steps each day. Do more household tasks or yard work. You may not be able to meet the advised mount of physical activity. But doing some moderate- or vigorous-intensity aerobic activity can help reduce your risk  for heart disease. Your healthcare provider can help you figure out what is best for you.    Choose 1 or more activities you enjoy.  Walking is one of the easiest things you can do. You can also try swimming, riding a bike, dancing, or taking an exercise class.    When to call your healthcare provider  Call your healthcare provider if you have any of these:     Chest pain or feel dizzy or lightheaded    Burning, tightness, pressure, or heaviness in your chest, neck, shoulders, back, or arms    Abnormal shortness of breath    More joint or muscle pain    A very fast or irregular heartbeat (palpitations)  [email or web address] last reviewed this educational content on 7/1/2019 2000-2021 [employer]. All rights reserved. This information is not intended as a substitute for professional medical care. Always follow your healthcare professional's instructions.          Understanding USDA MyPlate  The USDA has guidelines to help you make healthy food choices. These are called MyPlate. MyPlate shows the food groups that make up healthy meals using the image of a place setting. Before you eat, think about the healthiest choices for what to put on your plate or in your cup or bowl. To learn more about building a healthy plate, visit www.choosemyplate.gov.    The food groups    Fruits. Any fruit or 100% fruit juice counts as part of the Fruit Group. Fruits may be fresh, canned, frozen, or dried, and may be whole, cut-up, or pureed. Make 1/2 of your plate fruits and vegetables.    Vegetables. Any vegetable or 100% vegetable juice counts as a member of the Vegetable Group. Vegetables may be fresh, frozen, canned, or dried. They can be served raw or cooked and may be whole, cut-up, or mashed. Make 1/2 of your plate fruits and vegetables.    Grains. All foods made from grains are part of the Grains Group. These include wheat, rice, oats, cornmeal, and barley. Grains are often used to make foods such as bread, pasta, oatmeal,  cereal, tortillas, and grits. Grains should be no more than 1/4 of your plate. At least half of your grains should be whole grains.    Protein. This group includes meat, poultry, seafood, beans and peas, eggs, processed soy products (such as tofu), nuts (including nut butters), and seeds. Make protein choices no more than 1/4 of your plate. Meat and poultry choices should be lean or low fat.    Dairy. The Dairy Group includes all fluid milk products and foods made from milk that contain calcium, such as yogurt and cheese. (Foods that have little calcium, such as cream, butter, and cream cheese, are not part of this group.) Most dairy choices should be low-fat or fat-free.    Oils. Oils aren't a food group, but they do contain essential nutrients. However it's important to watch your intake of oils. These are fats that are liquid at room temperature. They include canola, corn, olive, soybean, vegetable, and sunflower oil. Foods that are mainly oil include mayonnaise, certain salad dressings, and soft margarines. You likely already get your daily oil allowance from the foods you eat.  Things to limit  Eating healthy also means limiting these things in your diet:       Salt (sodium). Many processed foods have a lot of sodium. To keep sodium intake down, eat fresh vegetables, meats, poultry, and seafood when possible. Purchase low-sodium, reduced-sodium, or no-salt-added food products at the store. And don't add salt to your meals at home. Instead, season them with herbs and spices such as dill, oregano, cumin, and paprika. Or try adding flavor with lemon or lime zest and juice.    Saturated fat. Saturated fats are most often found in animal products such as beef, pork, and chicken. They are often solid at room temperature, such as butter. To reduce your saturated fat intake, choose leaner cuts of meat and poultry. And try healthier cooking methods such as grilling, broiling, roasting, or baking. For a simple lower-fat  jeramieap, use plain nonfat yogurt instead of mayonnaise when making potato salad or macaroni salad.    Added sugars. These are sugars added to foods. They are in foods such as ice cream, candy, soda, fruit drinks, sports drinks, energy drinks, cookies, pastries, jams, and syrups. Cut down on added sugars by sharing sweet treats with a family member or friend. You can also choose fruit for dessert, and drink water or other unsweetened beverages.     [employer] last reviewed this educational content on 6/1/2020 2000-2021 [employer]. All rights reserved. This information is not intended as a substitute for professional medical care. Always follow your healthcare professional's instructions.          Signs of Hearing Loss      Hearing much better with one ear can be a sign of hearing loss.   Hearing loss is a problem shared by many people. In fact, it is one of the most common health problems, particularly as people age. Most people age 65 and older have some hearing loss. By age 80, almost everyone does. Hearing loss often occurs slowly over the years. So you may not realize your hearing has gotten worse.  Have your hearing checked  Call your healthcare provider if you:    Have to strain to hear normal conversation    Have to watch other people s faces very carefully to follow what they re saying    Need to ask people to repeat what they ve said    Often misunderstand what people are saying    Turn the volume of the television or radio up so high that others complain    Feel that people are mumbling when they re talking to you    Find that the effort to hear leaves you feeling tired and irritated    Notice, when using the phone, that you hear better with one ear than the other  [employer] last reviewed this educational content on 1/1/2020 2000-2021 [employer]. All rights reserved. This information is not intended as a substitute for professional medical care. Always follow your healthcare  professional's instructions.

## 2022-07-14 NOTE — PROGRESS NOTES
"SUBJECTIVE:   Eleazar Cortez is a 66 year old female who presents for Preventive Visit.      Patient has been advised of split billing requirements and indicates understanding: Yes  Are you in the first 12 months of your Medicare coverage?  Yes,  Visual Acuity:  Right Eye: 20/25   Left Eye: 20/20  Both Eyes: 20/20    Healthy Habits:     In general, how would you rate your overall health?  Good    Frequency of exercise:  1 day/week    Duration of exercise:  N/A    Do you usually eat at least 4 servings of fruit and vegetables a day, include whole grains    & fiber and avoid regularly eating high fat or \"junk\" foods?  No    Taking medications regularly:  Yes    Medication side effects:  None    Ability to successfully perform activities of daily living:  No assistance needed    Home Safety:  Lack of grab bars in the bathroom    Hearing Impairment:  Difficulty following a conversation in a noisy restaurant or crowded room    In the past 6 months, have you been bothered by leaking of urine?  No    In general, how would you rate your overall mental or emotional health?  Excellent      PHQ-2 Total Score: 0    Additional concerns today:  No    Do you feel safe in your environment? Yes    Have you ever done Advance Care Planning? (For example, a Health Directive, POLST, or a discussion with a medical provider or your loved ones about your wishes): Yes, patient states has an Advance Care Planning document and will bring a copy to the clinic.       Fall risk  Fallen 2 or more times in the past year?: No  Any fall with injury in the past year?: Yes    Cognitive Screening   1) Repeat 3 items (Leader, Season, Table)    2) Clock draw: NORMAL  3) 3 item recall: Recalls 3 objects  Results: 3 items recalled: COGNITIVE IMPAIRMENT LESS LIKELY    Mini-CogTM Copyright S Mahamed. Licensed by the author for use in [facility]; reprinted with permission (uriel@.[email or web address]). All rights reserved.      Do you have sleep apnea, excessive " snoring or daytime drowsiness?: no    Reviewed and updated as needed this visit by clinical staff                    Reviewed and updated as needed this visit by Provider                   Social History     Tobacco Use     Smoking status: Never Smoker     Smokeless tobacco: Never Used   Substance Use Topics     Alcohol use: No         Alcohol Use 7/13/2022   Prescreen: >3 drinks/day or >7 drinks/week? Not Applicable   Prescreen: >3 drinks/day or >7 drinks/week? -           Hypothyroidism Follow-up      Since last visit, patient describes the following symptoms: Weight stable, no hair loss, no skin changes, no constipation, no loose stools      Current providers sharing in care for this patient include:   Patient Care Team:  Cheryl Serraon PA-C as PCP - General (Family Medicine)  Cheryl Serrano PA-C as Assigned PCP    The following health maintenance items are reviewed in Epic and correct as of today:  Health Maintenance Due   Topic Date Due     ANNUAL REVIEW OF HM ORDERS  Never done     Pneumococcal Vaccine: 65+ Years (2 - PPSV23 or PCV20) 08/22/2018     MEDICARE ANNUAL WELLNESS VISIT  02/10/2022     Lab work is in process  Labs reviewed in EPIC  Pneumonia Vaccine: due  Mammogram Screening: Mammogram Screening: Recommended mammography every 1-2 years with patient discussion and risk factor consideration    FHS-7:   Breast CA Risk Assessment (FHS-7) 7/7/2022   Did any of your first-degree relatives have breast or ovarian cancer? Yes   Did any of your relatives have bilateral breast cancer? No   Did any man in your family have breast cancer? No   Did any woman in your family have breast and ovarian cancer? No   Did any woman in your family have breast cancer before age 50 y? No   Do you have 2 or more relatives with breast and/or ovarian cancer? No   Do you have 2 or more relatives with breast and/or bowel cancer? No       Mammogram Screening: Recommended annual mammography  Pertinent mammograms are  "reviewed under the imaging tab.    Review of Systems   Constitutional: Negative for chills and fever.   HENT: Negative for congestion, ear pain, hearing loss and sore throat.    Eyes: Negative for pain and visual disturbance.   Respiratory: Negative for cough and shortness of breath.    Cardiovascular: Positive for peripheral edema. Negative for chest pain and palpitations.   Gastrointestinal: Negative for abdominal pain, constipation, diarrhea, heartburn, hematochezia and nausea.   Breasts:  Negative for tenderness, breast mass and discharge.   Genitourinary: Negative for dysuria, frequency, genital sores, hematuria, pelvic pain, urgency, vaginal bleeding and vaginal discharge.   Musculoskeletal: Positive for joint swelling and myalgias. Negative for arthralgias.   Skin: Negative for rash.   Neurological: Negative for dizziness, weakness, headaches and paresthesias.   Psychiatric/Behavioral: Negative for mood changes. The patient is not nervous/anxious.      Constitutional, HEENT, cardiovascular, pulmonary, GI, , musculoskeletal, neuro, skin, endocrine and psych systems are negative, except as otherwise noted.    OBJECTIVE:   There were no vitals taken for this visit. Estimated body mass index is 23.33 kg/m  as calculated from the following:    Height as of 2/10/21: 1.651 m (5' 5\").    Weight as of 2/10/21: 63.6 kg (140 lb 3.2 oz).  Physical Exam  GENERAL APPEARANCE: healthy, alert and no distress  EYES: Eyes grossly normal to inspection, PERRL and conjunctivae and sclerae normal  HENT: ear canals and TM's normal, nose and mouth without ulcers or lesions, oropharynx clear and oral mucous membranes moist  NECK: no adenopathy, no asymmetry, masses, or scars and thyroid normal to palpation  RESP: lungs clear to auscultation - no rales, rhonchi or wheezes  BREAST: normal without masses, tenderness or nipple discharge and no palpable axillary masses or adenopathy  CV: regular rate and rhythm, normal S1 S2, no S3 or " S4, no murmur, click or rub, no peripheral edema and peripheral pulses strong  ABDOMEN: soft, nontender, no hepatosplenomegaly, no masses and bowel sounds normal  MS: no musculoskeletal defects are noted and gait is age appropriate without ataxia  SKIN: no suspicious lesions or rashes  NEURO: Normal strength and tone, sensory exam grossly normal, mentation intact and speech normal  PSYCH: mentation appears normal and affect normal/bright    Diagnostic Test Results:  Labs reviewed in Epic    ASSESSMENT / PLAN:   (Z00.00) Encounter for Medicare annual wellness exam  Comment:   Plan:   Comment:      HEALTH CARE MAINTENANCE              Reviewed USPTF recommendations and anticipatory guidance.              See orders.        Plan: Comprehensive metabolic panel (BMP + Alb, Alk         Phos, ALT, AST, Total. Bili, TP)            (C85.90) Lymphoma in remission (H)  Comment: doing well, will monitor labs.   Plan: CBC with platelets            (Z23) Encounter for immunization  Comment: due for pneumonia shot  Plan: Pneumococcal 20 Valent Conjugate (PCV20)            (Z12.31) Encounter for screening mammogram for breast cancer  Comment: I discussed in detail with Eleazar Cortez the importance of breast cancer screening through monthly self breast exams and annual breast exams in the clinic.    Plan: *MA Screening Digital Bilateral            (E03.9) Hypothyroidism, unspecified type  Comment: she has noticed some mild weight gain, however has also not been going to the gym since it was closed due to the covid19 pandemic.  She does have a pilates machine she has used in the past, I suggest starting that back up.  She has issues with her left knee arthritis and planning to have a replacement soon.  I recommend low impact exercise.     Plan: TSH with free T4 reflex            (Z12.11) Colon cancer screening  Comment: I discussed the importance of colorectal cancer screening, due to repeat colonoscopy      Plan: Colonscopy Screening  " Referral            (Z13.6) CARDIOVASCULAR SCREENING; LDL GOAL LESS THAN 160  Comment: due for screening.   Plan: Lipid panel reflex to direct LDL Fasting          Osteoporosis:  Continue with Boniva (started 2/2021). Previously on fosamax but not for a full 5 years ([id number])            COUNSELING:  Reviewed preventive health counseling, as reflected in patient instructions       Regular exercise       Healthy diet/nutrition       Hearing screening       Fall risk prevention       Colon cancer screening    Estimated body mass index is 23.33 kg/m  as calculated from the following:    Height as of 2/10/21: 1.651 m (5' 5\").    Weight as of 2/10/21: 63.6 kg (140 lb 3.2 oz).        She reports that she has never smoked. She has never used smokeless tobacco.      Appropriate preventive services were discussed with this patient, including applicable screening as appropriate for cardiovascular disease, diabetes, osteopenia/osteoporosis, and glaucoma.  As appropriate for age/gender, discussed screening for colorectal cancer, prostate cancer, breast cancer, and cervical cancer. Checklist reviewing preventive services available has been given to the patient.    Reviewed patients plan of care and provided an AVS. The Intermediate Care Plan ( asthma action plan, low back pain action plan, and migraine action plan) for Eleazar meets the Care Plan requirement. This Care Plan has been established and reviewed with the Patient.    Counseling Resources:  ATP IV Guidelines  Pooled Cohorts Equation Calculator  Breast Cancer Risk Calculator  Breast Cancer: Medication to Reduce Risk  FRAX Risk Assessment  ICSI Preventive Guidelines  Dietary Guidelines for Americans, 2010  USDA's MyPlate  ASA Prophylaxis  Lung CA Screening    [facility]    Identified Health Risks:    She is at risk for lack of exercise and has been provided with information to increase physical activity for the " benefit of her well-being.  The patient was counseled and encouraged to consider modifying their diet and eating habits. She was provided with information on recommended healthy diet options.  The patient was provided with written information regarding signs of hearing loss.

## 2022-07-15 PROBLEM — M81.0 OSTEOPOROSIS WITHOUT CURRENT PATHOLOGICAL FRACTURE, UNSPECIFIED OSTEOPOROSIS TYPE: Status: ACTIVE | Noted: 2022-07-15

## 2022-07-15 LAB
ALBUMIN SERPL-MCNC: 3.8 G/DL (ref 3.4–5)
ALP SERPL-CCNC: 44 U/L (ref 40–150)
ALT SERPL W P-5'-P-CCNC: 30 U/L (ref 0–50)
ANION GAP SERPL CALCULATED.3IONS-SCNC: 7 MMOL/L (ref 3–14)
AST SERPL W P-5'-P-CCNC: 35 U/L (ref 0–45)
BILIRUB SERPL-MCNC: 0.6 MG/DL (ref 0.2–1.3)
BUN SERPL-MCNC: 18 MG/DL (ref 7–30)
CALCIUM SERPL-MCNC: 8.9 MG/DL (ref 8.5–10.1)
CHLORIDE BLD-SCNC: 106 MMOL/L (ref 94–109)
CHOLEST SERPL-MCNC: 266 MG/DL
CO2 SERPL-SCNC: 27 MMOL/L (ref 20–32)
CREAT SERPL-MCNC: 0.9 MG/DL (ref 0.52–1.04)
FASTING STATUS PATIENT QL REPORTED: YES
GFR SERPL CREATININE-BSD FRML MDRD: 71 ML/MIN/1.73M2
GLUCOSE BLD-MCNC: 91 MG/DL (ref 70–99)
HDLC SERPL-MCNC: 83 MG/DL
LDLC SERPL CALC-MCNC: 154 MG/DL
NONHDLC SERPL-MCNC: 183 MG/DL
POTASSIUM BLD-SCNC: 4.5 MMOL/L (ref 3.4–5.3)
PROT SERPL-MCNC: 7.2 G/DL (ref 6.8–8.8)
SODIUM SERPL-SCNC: 140 MMOL/L (ref 133–144)
TRIGL SERPL-MCNC: 144 MG/DL
TSH SERPL DL<=0.005 MIU/L-ACNC: 3.96 MU/L (ref 0.4–4)

## 2022-07-15 RX ORDER — LEVOTHYROXINE SODIUM 50 UG/1
50 TABLET ORAL DAILY
Qty: 90 TABLET | Refills: 3 | Status: SHIPPED | OUTPATIENT
Start: 2022-07-15 | End: 2023-07-18

## 2022-07-15 RX ORDER — IBANDRONATE SODIUM 150 MG/1
150 TABLET, FILM COATED ORAL
Qty: 3 TABLET | Refills: 3 | Status: SHIPPED | OUTPATIENT
Start: 2022-07-15 | End: 2023-06-27

## 2022-08-17 ENCOUNTER — HOSPITAL ENCOUNTER (OUTPATIENT)
Dept: MAMMOGRAPHY | Facility: CLINIC | Age: 67
Discharge: HOME | End: 2022-08-17
Attending: PHYSICIAN ASSISTANT | Admitting: PHYSICIAN ASSISTANT
Payer: COMMERCIAL

## 2022-08-17 DIAGNOSIS — Z12.31 ENCOUNTER FOR SCREENING MAMMOGRAM FOR BREAST CANCER: ICD-10-CM

## 2022-08-17 PROCEDURE — 77067 SCR MAMMO BI INCL CAD: CPT

## 2022-10-11 ENCOUNTER — HOSPITAL ENCOUNTER (OUTPATIENT)
Facility: CLINIC | Age: 67
End: 2022-10-11
Attending: SURGERY | Admitting: SURGERY
Payer: COMMERCIAL

## 2022-11-07 ENCOUNTER — TRANSFERRED RECORDS (OUTPATIENT)
Dept: HEALTH INFORMATION MANAGEMENT | Facility: CLINIC | Age: 67
End: 2022-11-07

## 2022-11-28 ENCOUNTER — OFFICE VISIT (OUTPATIENT)
Dept: FAMILY MEDICINE | Facility: CLINIC | Age: 67
End: 2022-11-28
Payer: COMMERCIAL

## 2022-11-28 ENCOUNTER — ANCILLARY PROCEDURE (OUTPATIENT)
Dept: GENERAL RADIOLOGY | Facility: CLINIC | Age: 67
End: 2022-11-28
Attending: PHYSICIAN ASSISTANT
Payer: COMMERCIAL

## 2022-11-28 VITALS
DIASTOLIC BLOOD PRESSURE: 68 MMHG | TEMPERATURE: 98.3 F | WEIGHT: 150 LBS | RESPIRATION RATE: 16 BRPM | HEART RATE: 110 BPM | SYSTOLIC BLOOD PRESSURE: 112 MMHG | OXYGEN SATURATION: 99 % | HEIGHT: 65 IN | BODY MASS INDEX: 24.99 KG/M2

## 2022-11-28 DIAGNOSIS — M79.645 PAIN OF FINGER OF LEFT HAND: ICD-10-CM

## 2022-11-28 DIAGNOSIS — M17.12 PRIMARY OSTEOARTHRITIS OF LEFT KNEE: ICD-10-CM

## 2022-11-28 DIAGNOSIS — E03.9 HYPOTHYROIDISM, UNSPECIFIED TYPE: ICD-10-CM

## 2022-11-28 DIAGNOSIS — Z01.818 PREOP GENERAL PHYSICAL EXAM: Primary | ICD-10-CM

## 2022-11-28 PROCEDURE — 73140 X-RAY EXAM OF FINGER(S): CPT | Mod: TC | Performed by: RADIOLOGY

## 2022-11-28 PROCEDURE — 99214 OFFICE O/P EST MOD 30 MIN: CPT | Performed by: PHYSICIAN ASSISTANT

## 2022-11-28 ASSESSMENT — PAIN SCALES - GENERAL: PAINLEVEL: NO PAIN (0)

## 2022-11-28 NOTE — PROGRESS NOTES
[facility]  [address] [facility] [facility]  [place] [address]  Phone: [phone]  Primary Provider: Chapito Paez  Pre-op Performing Provider: CHAPITO PAEZ      PREOPERATIVE EVALUATION:  Today's date: 11/28/2022    Eleazar Cortez is a 67 year old female who presents for a preoperative evaluation.    Surgical Information:  Surgery/Procedure: LEFT TOTAL KNEE ARTHROPLASTY  Surgery Location: [facility]  Surgeon: Daren Coleman MD  Surgery Date: 12/05/22  Time of Surgery: 12:10PM  Where patient plans to recover: Other: in hospital over night  Fax number for surgical facility: Note does not need to be faxed, will be available electronically in Epic.    Type of Anesthesia Anticipated: Choice    Assessment & Plan     The proposed surgical procedure is considered INTERMEDIATE risk.    Preop general physical exam  URI symptoms nearly resolved.  Cleared for surgery as long as covid test negative.   - Asymptomatic COVID-19 Virus (Coronavirus) by PCR Nose; Future    Primary osteoarthritis of left knee  Not responding to conservative treatment, further surgical intervention necessary.     Pain of finger of left hand  Xray does not show any recent fracture.    - XR Finger Left G/E 2 Views; Future    Hypothyroidism, unspecified type  Stable, levels checked 7/22 and in balance.              Risks and Recommendations:  The patient has the following additional risks and recommendations for perioperative complications:   - No identified additional risk factors other than previously addressed    Medication Instructions:  Patient is to take all scheduled medications on the day of surgery  (levothyroxine)    Hold fish oil supplement starting today.  May restart after surgery.   Ok to take thyroid medication the morning of surgery (with small sip of water)   Hold ibuprofen a few days prior to surgery, instead use tylenol.    RECOMMENDATION:  APPROVAL GIVEN to proceed with  proposed procedure, without further diagnostic evaluation.              23 minutes spent on the date of the encounter doing chart review, history and exam, documentation and further activities per the note        Subjective     HPI related to upcoming procedure:  Has been having knee pain for over a year.  Pain is mainly on medial aspect and causing pain with ambulation. Has had multiple joint injections with mild success.   She has a lot of arthritis present and was advised to have surgery.   She has had her right knee replaced in 2015.      Was having POST NASAL DRIP and a sore throat last week.  She did multiple home covid tests and a strep test in  was all negative.  Currently she feels her symptoms are improving/pretty much resolved.      She needs a covid test 4 days prior to surgery.      She does c/o pain in her left ring finger that started after she jammed it into a door about 2 months ago.  No previous imaging done.  Still with achy pain.  Has full ROM.     Preop Questions 11/21/2022   1. Have you ever had a heart attack or stroke? No   2. Have you ever had surgery on your heart or blood vessels, such as a stent placement, a coronary artery bypass, or surgery on an artery in your head, neck, heart, or legs? No   3. Do you have chest pain with activity? No   4. Do you have a history of  heart failure? No   5. Do you currently have a cold, bronchitis or symptoms of other infection? YES - Sore throat and nasal drainage x 1 week, negative for both strep and covid.    6. Do you have a cough, shortness of breath, or wheezing? No   7. Do you or anyone in your family have previous history of blood clots? No   8. Do you or does anyone in your family have a serious bleeding problem such as prolonged bleeding following surgeries or cuts? No   9. Have you ever had problems with anemia or been told to take iron pills? No   10. Have you had any abnormal blood loss such as black, tarry or bloody stools, or abnormal  vaginal bleeding? No   11. Have you ever had a blood transfusion? No   12. Are you willing to have a blood transfusion if it is medically needed before, during, or after your surgery? Yes   13. Have you or any of your relatives ever had problems with anesthesia? No   14. Do you have sleep apnea, excessive snoring or daytime drowsiness? No   15. Do you have any artifical heart valves or other implanted medical devices like a pacemaker, defibrillator, or continuous glucose monitor? No   16. Do you have artificial joints? YES - Right knee    17. Are you allergic to latex? No   18. Is there any chance that you may be pregnant? -       Health Care Directive:  Patient does not have a Health Care Directive or Living Will:     Preoperative Review of :   reviewed - no record of controlled substances prescribed.      Status of Chronic Conditions:  See problem list for active medical problems.  Problems all longstanding and stable, except as noted/documented.  See ROS for pertinent symptoms related to these conditions.      Review of Systems  Constitutional, neuro, ENT, endocrine, pulmonary, cardiac, gastrointestinal, genitourinary, musculoskeletal, integument and psychiatric systems are negative, except as otherwise noted.    Patient Active Problem List    Diagnosis Date Noted     Osteoporosis without current pathological fracture, unspecified osteoporosis type 07/15/2022     Priority: Medium     Lymphoma in remission (H) 02/10/2021     Priority: Medium     Superficial basal cell carcinoma 01/15/2020     Priority: Medium     Shoulder pain, right 10/27/2016     Priority: Medium     Knee pain 03/26/2015     Priority: Medium     Other postprocedural status(V45.89) 03/26/2015     Priority: Medium     Tear of lateral cartilage or meniscus of knee, current 06/15/2014     Priority: Medium     Colon polyp, hyperplastic 10/11/2012     Priority: Medium     Environmental allergies 10/11/2012     Priority: Medium     Hypothyroidism  10/11/2012     Priority: Medium     CARDIOVASCULAR SCREENING; LDL GOAL LESS THAN 160 10/31/2010     Priority: Medium     Seborrheic Keratosis right leg 02/23/2009     Priority: Medium             Basal Cell Carcinoma of right lower medial Eyelid 02/16/2009     Priority: Medium     Dr.J Rogers Mohs excision, four stages  04/30/09 normal Skin exam no signs of recurrent malignancy. Eyelid doing well       Atrophic vaginitis 11/22/2008     Priority: Medium     Benign neoplasm of colon 12/27/2006     Priority: Medium     12/27/06- colonoscopy revealed 2 hyperplastic polyps                  Plan repeat colonoscopy in 5 years       NONHODGKIN'S LYMPHOMA IN REMISSION 07/07/2006     Priority: Medium     Diagnosed in 2002: Stage I Non-Hodgkin's Lymphoma, Grade I, Follicular, Left Groin.  Fully resected, radiation.  No evidence of disease since.  6/28/2006 Dr Bri Kaur @ [facility]:  Last CT scan normal. CBC normal.  Plan: RTC in 6 months, with a CT prior.  12/18/08 @ [facility]:Ct 12/15/08 NL, CBC NL RTC in 2 years. No reacurrance of Dz       Disorder of bone and cartilage 12/12/2005     Priority: Medium     Osteopenia: Dexa in 12/2005 shows stable bone density which is moderately thin in hip area but normal in spine.   Would recommend she continue with fosamax  Problem list name updated by automated process. Provider to review       Advanced directives, counseling/discussion 10/11/2012     Priority: Low     Patient states has Advance Directive and will bring in a copy to clinic. 10/11/2012             Past Medical History:   Diagnosis Date     Arthritis 2014    in knee     BCC (basal cell carcinoma), face 2009    right lateral nose.  removed via Mohs     Hypothyroidism 10/11/2012     Osteoporosis      Other malignant lymphomas, unspecified site, extranodal and solid organ sites 2002    Non-Hodgkin's Lymphoma     Scoliosis (and kyphoscoliosis), idiopathic      Uncomplicated  asthma childhood    Rarely... haven't noticed for a long time     Past Surgical History:   Procedure Laterality Date     BIOPSY  2002, 2011?    leg, nose     COLONOSCOPY  11/09/2012    Procedure: COLONOSCOPY;  Colonoscopy;  Surgeon: Drew Perdue MD;  Location: [facility]     ORTHOPEDIC SURGERY Left 2012, 2014    broke radius by wrist... plate and 9 screws put in, arthrosc     SURGICAL HISTORY OF -   12/2001    Lymph Node Excision on left leg     SURGICAL HISTORY OF -   01/2009    BCC removal right nasal area     ZZC TOTAL KNEE ARTHROPLASTY Right 03/2015     Current Outpatient Medications   Medication Sig Dispense Refill     Calcium Citrate-Vitamin D (CALCIUM + D PO)        IBANdronate (BONIVA) 150 MG tablet Take 1 tablet (150 mg) by mouth every 30 days 3 tablet 3     levothyroxine (SYNTHROID/LEVOTHROID) 50 MCG tablet Take 1 tablet (50 mcg) by mouth daily 90 tablet 3     UNABLE TO FIND MEDICATION NAME: Melaleuca PM - Longevity  2 CardiOmegaEPA (Omega 3 Fishoils - 1000 mg EPG and 100 mg DHA)  2 Vitality Multivitamin and Mineral (Iron, Vitamin K, Folic acid, Biotin, Calcium)  2 CellWise Antioxidant (Olive extract, grape seed extract, Vitamin C, carotenoids)  2 ProveXCV (Blood pressure support)  2 RecoverAl (Inflammation)  1 Florify (acid resistance, probiotic)  -1 Vitality [place] (omega 3, Brain eye and heart)  -2 Acuity [place] (Brain health)  -1 K2-03 (Redirects calcium bone health)  -1Replenex Extra strength   -1 Nutraview (Vision- macular/lens health)  1 CoQ10 +Cellular Energy Support (antioxidants)         Allergies   Allergen Reactions     Codeine Difficulty breathing     Also felt light headed.  After her arthroscopy 16-18 hour after surgery ( had been taking the Tylenol #3 ) did have an episode with lightheadedness and difficulty breathing/. Also felt like her neck has gotten stiff, couldn't move.  Lasting less than 5 min.   Has been able to take Tylenol since then without problems.       Dye [Contrast Dye]  "Other (See Comments)     congestion        Social History     Tobacco Use     Smoking status: Never     Smokeless tobacco: Never   Substance Use Topics     Alcohol use: No       History   Drug Use No         Objective     /68 (BP Location: Left arm, Patient Position: Chair, Cuff Size: Adult Regular)   Pulse 110   Temp 98.3  F (36.8  C) (Tympanic)   Resp 16   Ht 1.651 m (5' 5\")   Wt 68 kg (150 lb)   SpO2 99%   BMI 24.96 kg/m      Physical Exam    GENERAL APPEARANCE: healthy, alert and no distress     EYES: EOMI, PERRL     HENT: ear canals and TM's normal and nose and mouth without ulcers or lesions     NECK: no adenopathy, no asymmetry, masses, or scars and thyroid normal to palpation     RESP: lungs clear to auscultation - no rales, rhonchi or wheezes     CV: regular rates and rhythm, normal S1 S2, no S3 or S4 and no murmur, click or rub     ABDOMEN:  soft, nontender, no HSM or masses and bowel sounds normal     MS: extremities normal- no gross deformities noted, no evidence of inflammation in joints, FROM in all extremities.  Tenderness over left ring finger PIP joint, no bony deformities and full ROM of finger (no clicking or snapping).      SKIN: no suspicious lesions or rashes     NEURO: Normal strength and tone, sensory exam grossly normal, mentation intact and speech normal     PSYCH: mentation appears normal. and affect normal/bright     LYMPHATICS: No cervical adenopathy    Recent Labs   Lab Test 07/14/22  1051 02/10/21  1157   HGB 12.6 12.9    280    138   POTASSIUM 4.5 4.8   CR 0.90 0.85        Diagnostics:  No labs were ordered during this visit.   No EKG required, no history of coronary heart disease, significant arrhythmia, peripheral arterial disease or other structural heart disease.    Revised Cardiac Risk Index (RCRI):  The patient has the following serious cardiovascular risks for perioperative complications:   - No serious cardiac risks = 0 points     RCRI Interpretation: " 0 points: Class I (very low risk - 0.4% complication rate)           Signed Electronically by: Cheryl Serrano PA-C  Copy of this evaluation report is provided to requesting physician.

## 2022-11-28 NOTE — PROVIDER NOTIFICATION
Planning to discharge home on POD 1 in the morning with her  helping her.       11/28/22 1156   Discharge Planning   Patient/Family Anticipates Transition to home with family  (outpatient PT arranged at [facility])   Concerns to be Addressed all concerns addressed in this encounter   Living Arrangements   People in Home spouse   Type of Residence Private Residence   Is your private residence a single family home or apartment? Single family home   Number of Stairs, Within Home, Primary three   Stair Railings, Within Home, Primary none   Once home, are you able to live on one level? Yes   Which level? Main Level   Bathroom Shower/Tub Tub/Shower unit   Equipment Currently Used at Home cane, straight;shower chair  (Borrowing a walker from her sister to use after surgery.)   Support System   Support Systems Spouse/Significant Other  (, William)   Do you have someone available to stay with you one or two nights once you are home? Yes   Education   Patient attended total joint pre-op class/received pre-op teaching  email/phone call

## 2022-11-28 NOTE — PATIENT INSTRUCTIONS
Hold fish oil supplement starting today.  May restart after surgery.   Ok to take thyroid medication the morning of surgery (with small sip of water)   Hold ibuprofen a few days prior to surgery, instead use tylenol.      Preparing for Your Surgery  Getting started  A nurse will call you to review your health history and instructions. They will give you an arrival time based on your scheduled surgery time. Please be ready to share:  Your doctor s clinic name and phone number  Your medical, surgical, and anesthesia history  A list of allergies and sensitivities  A list of medicines, including herbal treatments and over-the-counter drugs  Whether the patient has a legal guardian (ask how to send us the papers in advance)  Please tell us if you re pregnant--or if there s any chance you might be pregnant. Some surgeries may injure a fetus (unborn baby), so they require a pregnancy test. Surgeries that are safe for a fetus don t always need a test, and you can choose whether to have one.   If you have a child who s having surgery, please ask for a copy of Preparing for Your Child s Surgery.    Preparing for surgery  Within 10 to 30 days of surgery: Have a pre-op exam (sometimes called an H&P, or History and Physical). This can be done at a clinic or pre-operative center.  If you re having a , you may not need this exam. Talk to your care team.  At your pre-op exam, talk to your care team about all medicines you take. If you need to stop any medicines before surgery, ask when to start taking them again.  We do this for your safety. Many medicines can make you bleed too much during surgery. Some change how well surgery (anesthesia) drugs work.  Call your insurance company to let them know you re having surgery. (If you don t have insurance, call [phone].)  Call your clinic if there s any change in your health. This includes signs of a cold or flu (sore throat, runny nose, cough, rash, fever). It also includes  a scrape or scratch near the surgery site.  If you have questions on the day of surgery, call your hospital or surgery center.  COVID testing  You may need to be tested for COVID-19 before having surgery. If so, we will give you instructions (or click here).  Eating and drinking guidelines  For your safety: Unless your surgeon tells you otherwise, follow the guidelines below.  Eat and drink as usual until 8 hours before you arrive for surgery. After that, no food or milk.  Drink clear liquids until 2 hours before you arrive. These are liquids you can see through, like water, Gatorade, and Propel Water. They also include plain black coffee and tea (no cream or milk), candy, and breath mints. You can spit out gum when you arrive.  If you drink alcohol: Stop drinking it the night before surgery.  If your care team tells you to take medicine on the morning of surgery, it s okay to take it with a sip of water.  Preventing infection  Shower or bathe the night before and morning of your surgery. Follow the instructions your clinic gave you. (If no instructions, use regular soap.)  Don t shave or clip hair near your surgery site. We ll remove the hair if needed.  Don t smoke or vape the morning of surgery. You may chew nicotine gum up to 2 hours before surgery. A nicotine patch is okay.  Note: Some surgeries require you to completely quit smoking and nicotine. Check with your surgeon.  Your care team will make every effort to keep you safe from infection. We will:  Clean our hands often with soap and water (or an alcohol-based hand rub).  Clean the skin at your surgery site with a special soap that kills germs.  Give you a special gown to keep you warm. (Cold raises the risk of infection.)  Wear special hair covers, masks, gowns and gloves during surgery.  Give antibiotic medicine, if prescribed. Not all surgeries need antibiotics.  What to bring on the day of surgery  Photo ID and insurance card  Copy of your health care  directive, if you have one  Glasses and hearing aids (bring cases)  You can t wear contacts during surgery  Inhaler and eye drops, if you use them (tell us about these when you arrive)  CPAP machine or breathing device, if you use them  A few personal items, if spending the night  If you have . . .  A pacemaker, ICD (cardiac defibrillator) or other implant: Bring the ID card.  An implanted stimulator: Bring the remote control.  A legal guardian: Bring a copy of the certified (court-stamped) guardianship papers.  Please remove any jewelry, including body piercings. Leave jewelry and other valuables at home.  If you re going home the day of surgery  You must have a responsible adult drive you home. They should stay with you overnight as well.  If you don t have someone to stay with you, and you aren t safe to go home alone, we may keep you overnight. Insurance often won t pay for this.  After surgery  If it s hard to control your pain or you need more pain medicine, please call your surgeon s office.  Questions?   If you have any questions for your care team, list them here:   ____________________________________________________________________________________________________________________________________________________________________________________________________________________________________________________________________  For informational purposes only. Not to replace the advice of your health care provider. Copyright   2003, 2019 [employer] [facility]. All rights reserved. Clinically reviewed by Elisha Willams MD. [employer] [id number] - REV 10/22.

## 2022-11-28 NOTE — H&P (VIEW-ONLY)
[facility]  [address] [facility]  [place] [address]  Phone: [phone]  Primary Provider: Chapito Paez  Pre-op Performing Provider: CHAPITO PAEZ      PREOPERATIVE EVALUATION:  Today's date: 11/28/2022    Eleazar Cortez is a 67 year old female who presents for a preoperative evaluation.    Surgical Information:  Surgery/Procedure: LEFT TOTAL KNEE ARTHROPLASTY  Surgery Location: [facility]  Surgeon: Daren Coleman MD  Surgery Date: 12/05/22  Time of Surgery: 12:10PM  Where patient plans to recover: Other: in hospital over night  Fax number for surgical facility: Note does not need to be faxed, will be available electronically in Epic.    Type of Anesthesia Anticipated: Choice    Assessment & Plan     The proposed surgical procedure is considered INTERMEDIATE risk.    Preop general physical exam  URI symptoms nearly resolved.  Cleared for surgery as long as covid test negative.   - Asymptomatic COVID-19 Virus (Coronavirus) by PCR Nose; Future    Primary osteoarthritis of left knee  Not responding to conservative treatment, further surgical intervention necessary.     Pain of finger of left hand  Xray does not show any recent fracture.    - XR Finger Left G/E 2 Views; Future    Hypothyroidism, unspecified type  Stable, levels checked 7/22 and in balance.              Risks and Recommendations:  The patient has the following additional risks and recommendations for perioperative complications:   - No identified additional risk factors other than previously addressed    Medication Instructions:  Patient is to take all scheduled medications on the day of surgery  (levothyroxine)    Hold fish oil supplement starting today.  May restart after surgery.   Ok to take thyroid medication the morning of surgery (with small sip of water)   Hold ibuprofen a few days prior to surgery, instead use tylenol.    RECOMMENDATION:  APPROVAL GIVEN to proceed with  proposed procedure, without further diagnostic evaluation.              23 minutes spent on the date of the encounter doing chart review, history and exam, documentation and further activities per the note        Subjective     HPI related to upcoming procedure:  Has been having knee pain for over a year.  Pain is mainly on medial aspect and causing pain with ambulation. Has had multiple joint injections with mild success.   She has a lot of arthritis present and was advised to have surgery.   She has had her right knee replaced in 2015.      Was having POST NASAL DRIP and a sore throat last week.  She did multiple home covid tests and a strep test in  was all negative.  Currently she feels her symptoms are improving/pretty much resolved.      She needs a covid test 4 days prior to surgery.      She does c/o pain in her left ring finger that started after she jammed it into a door about 2 months ago.  No previous imaging done.  Still with achy pain.  Has full ROM.     Preop Questions 11/21/2022   1. Have you ever had a heart attack or stroke? No   2. Have you ever had surgery on your heart or blood vessels, such as a stent placement, a coronary artery bypass, or surgery on an artery in your head, neck, heart, or legs? No   3. Do you have chest pain with activity? No   4. Do you have a history of  heart failure? No   5. Do you currently have a cold, bronchitis or symptoms of other infection? YES - Sore throat and nasal drainage x 1 week, negative for both strep and covid.    6. Do you have a cough, shortness of breath, or wheezing? No   7. Do you or anyone in your family have previous history of blood clots? No   8. Do you or does anyone in your family have a serious bleeding problem such as prolonged bleeding following surgeries or cuts? No   9. Have you ever had problems with anemia or been told to take iron pills? No   10. Have you had any abnormal blood loss such as black, tarry or bloody stools, or abnormal  vaginal bleeding? No   11. Have you ever had a blood transfusion? No   12. Are you willing to have a blood transfusion if it is medically needed before, during, or after your surgery? Yes   13. Have you or any of your relatives ever had problems with anesthesia? No   14. Do you have sleep apnea, excessive snoring or daytime drowsiness? No   15. Do you have any artifical heart valves or other implanted medical devices like a pacemaker, defibrillator, or continuous glucose monitor? No   16. Do you have artificial joints? YES - Right knee    17. Are you allergic to latex? No   18. Is there any chance that you may be pregnant? -       Health Care Directive:  Patient does not have a Health Care Directive or Living Will:     Preoperative Review of :   reviewed - no record of controlled substances prescribed.      Status of Chronic Conditions:  See problem list for active medical problems.  Problems all longstanding and stable, except as noted/documented.  See ROS for pertinent symptoms related to these conditions.      Review of Systems  Constitutional, neuro, ENT, endocrine, pulmonary, cardiac, gastrointestinal, genitourinary, musculoskeletal, integument and psychiatric systems are negative, except as otherwise noted.    Patient Active Problem List    Diagnosis Date Noted     Osteoporosis without current pathological fracture, unspecified osteoporosis type 07/15/2022     Priority: Medium     Lymphoma in remission (H) 02/10/2021     Priority: Medium     Superficial basal cell carcinoma 01/15/2020     Priority: Medium     Shoulder pain, right 10/27/2016     Priority: Medium     Knee pain 03/26/2015     Priority: Medium     Other postprocedural status(V45.89) 03/26/2015     Priority: Medium     Tear of lateral cartilage or meniscus of knee, current 06/15/2014     Priority: Medium     Colon polyp, hyperplastic 10/11/2012     Priority: Medium     Environmental allergies 10/11/2012     Priority: Medium     Hypothyroidism  10/11/2012     Priority: Medium     CARDIOVASCULAR SCREENING; LDL GOAL LESS THAN 160 10/31/2010     Priority: Medium     Seborrheic Keratosis right leg 02/23/2009     Priority: Medium             Basal Cell Carcinoma of right lower medial Eyelid 02/16/2009     Priority: Medium     Dr.J Rogers Mohs excision, four stages  04/30/09 normal Skin exam no signs of recurrent malignancy. Eyelid doing well       Atrophic vaginitis 11/22/2008     Priority: Medium     Benign neoplasm of colon 12/27/2006     Priority: Medium     12/27/06- colonoscopy revealed 2 hyperplastic polyps                  Plan repeat colonoscopy in 5 years       NONHODGKIN'S LYMPHOMA IN REMISSION 07/07/2006     Priority: Medium     Diagnosed in 2002: Stage I Non-Hodgkin's Lymphoma, Grade I, Follicular, Left Groin.  Fully resected, radiation.  No evidence of disease since.  6/28/2006 Dr Bri Kaur @ [facility]:  Last CT scan normal. CBC normal.  Plan: RTC in 6 months, with a CT prior.  12/18/08 @ [facility]:Ct 12/15/08 NL, CBC NL RTC in 2 years. No reacurrance of Dz       Disorder of bone and cartilage 12/12/2005     Priority: Medium     Osteopenia: Dexa in 12/2005 shows stable bone density which is moderately thin in hip area but normal in spine.   Would recommend she continue with fosamax  Problem list name updated by automated process. Provider to review       Advanced directives, counseling/discussion 10/11/2012     Priority: Low     Patient states has Advance Directive and will bring in a copy to clinic. 10/11/2012             Past Medical History:   Diagnosis Date     Arthritis 2014    in knee     BCC (basal cell carcinoma), face 2009    right lateral nose.  removed via Mohs     Hypothyroidism 10/11/2012     Osteoporosis      Other malignant lymphomas, unspecified site, extranodal and solid organ sites 2002    Non-Hodgkin's Lymphoma     Scoliosis (and kyphoscoliosis), idiopathic      Uncomplicated  asthma childhood    Rarely... haven't noticed for a long time     Past Surgical History:   Procedure Laterality Date     BIOPSY  2002, 2011?    leg, nose     COLONOSCOPY  11/09/2012    Procedure: COLONOSCOPY;  Colonoscopy;  Surgeon: Drew Perdue MD;  Location: [facility]     ORTHOPEDIC SURGERY Left 2012, 2014    broke radius by wrist... plate and 9 screws put in, arthrosc     SURGICAL HISTORY OF -   12/2001    Lymph Node Excision on left leg     SURGICAL HISTORY OF -   01/2009    BCC removal right nasal area     ZZC TOTAL KNEE ARTHROPLASTY Right 03/2015     Current Outpatient Medications   Medication Sig Dispense Refill     Calcium Citrate-Vitamin D (CALCIUM + D PO)        IBANdronate (BONIVA) 150 MG tablet Take 1 tablet (150 mg) by mouth every 30 days 3 tablet 3     levothyroxine (SYNTHROID/LEVOTHROID) 50 MCG tablet Take 1 tablet (50 mcg) by mouth daily 90 tablet 3     UNABLE TO FIND MEDICATION NAME: Melaleuca PM - Longevity  2 CardiOmegaEPA (Omega 3 Fishoils - 1000 mg EPG and 100 mg DHA)  2 Vitality Multivitamin and Mineral (Iron, Vitamin K, Folic acid, Biotin, Calcium)  2 CellWise Antioxidant (Olive extract, grape seed extract, Vitamin C, carotenoids)  2 ProveXCV (Blood pressure support)  2 RecoverAl (Inflammation)  1 Florify (acid resistance, probiotic)  -1 Vitality [place] (omega 3, Brain eye and heart)  -2 Acuity [place] (Brain health)  -1 K2-03 (Redirects calcium bone health)  -1Replenex Extra strength   -1 Nutraview (Vision- macular/lens health)  1 CoQ10 +Cellular Energy Support (antioxidants)         Allergies   Allergen Reactions     Codeine Difficulty breathing     Also felt light headed.  After her arthroscopy 16-18 hour after surgery ( had been taking the Tylenol #3 ) did have an episode with lightheadedness and difficulty breathing/. Also felt like her neck has gotten stiff, couldn't move.  Lasting less than 5 min.   Has been able to take Tylenol since then without problems.       Dye [Contrast Dye]  "Other (See Comments)     congestion        Social History     Tobacco Use     Smoking status: Never     Smokeless tobacco: Never   Substance Use Topics     Alcohol use: No       History   Drug Use No         Objective     /68 (BP Location: Left arm, Patient Position: Chair, Cuff Size: Adult Regular)   Pulse 110   Temp 98.3  F (36.8  C) (Tympanic)   Resp 16   Ht 1.651 m (5' 5\")   Wt 68 kg (150 lb)   SpO2 99%   BMI 24.96 kg/m      Physical Exam    GENERAL APPEARANCE: healthy, alert and no distress     EYES: EOMI, PERRL     HENT: ear canals and TM's normal and nose and mouth without ulcers or lesions     NECK: no adenopathy, no asymmetry, masses, or scars and thyroid normal to palpation     RESP: lungs clear to auscultation - no rales, rhonchi or wheezes     CV: regular rates and rhythm, normal S1 S2, no S3 or S4 and no murmur, click or rub     ABDOMEN:  soft, nontender, no HSM or masses and bowel sounds normal     MS: extremities normal- no gross deformities noted, no evidence of inflammation in joints, FROM in all extremities.  Tenderness over left ring finger PIP joint, no bony deformities and full ROM of finger (no clicking or snapping).      SKIN: no suspicious lesions or rashes     NEURO: Normal strength and tone, sensory exam grossly normal, mentation intact and speech normal     PSYCH: mentation appears normal. and affect normal/bright     LYMPHATICS: No cervical adenopathy    Recent Labs   Lab Test 07/14/22  1051 02/10/21  1157   HGB 12.6 12.9    280    138   POTASSIUM 4.5 4.8   CR 0.90 0.85        Diagnostics:  No labs were ordered during this visit.   No EKG required, no history of coronary heart disease, significant arrhythmia, peripheral arterial disease or other structural heart disease.    Revised Cardiac Risk Index (RCRI):  The patient has the following serious cardiovascular risks for perioperative complications:   - No serious cardiac risks = 0 points     RCRI Interpretation: " 0 points: Class I (very low risk - 0.4% complication rate)           Signed Electronically by: Cheryl Serrano PA-C  Copy of this evaluation report is provided to requesting physician.

## 2022-11-29 ENCOUNTER — MYC MEDICAL ADVICE (OUTPATIENT)
Dept: FAMILY MEDICINE | Facility: CLINIC | Age: 67
End: 2022-11-29

## 2022-11-29 NOTE — TELEPHONE ENCOUNTER
Routing MyChart to PCP. Patient is asking you about taking allergy meds and Vitamin C prior to surgery on Monday.    Shukri Aguilera RN

## 2022-12-01 ENCOUNTER — LAB (OUTPATIENT)
Dept: LAB | Facility: CLINIC | Age: 67
End: 2022-12-01
Attending: PHYSICIAN ASSISTANT
Payer: COMMERCIAL

## 2022-12-01 DIAGNOSIS — Z01.818 PREOP GENERAL PHYSICAL EXAM: ICD-10-CM

## 2022-12-01 PROCEDURE — U0005 INFEC AGEN DETEC AMPLI PROBE: HCPCS

## 2022-12-01 PROCEDURE — U0003 INFECTIOUS AGENT DETECTION BY NUCLEIC ACID (DNA OR RNA); SEVERE ACUTE RESPIRATORY SYNDROME CORONAVIRUS 2 (SARS-COV-2) (CORONAVIRUS DISEASE [COVID-19]), AMPLIFIED PROBE TECHNIQUE, MAKING USE OF HIGH THROUGHPUT TECHNOLOGIES AS DESCRIBED BY CMS-2020-01-R: HCPCS

## 2022-12-02 LAB — SARS-COV-2 RNA RESP QL NAA+PROBE: NEGATIVE

## 2022-12-05 ENCOUNTER — ANESTHESIA EVENT (OUTPATIENT)
Dept: SURGERY | Facility: CLINIC | Age: 67
End: 2022-12-05
Payer: COMMERCIAL

## 2022-12-05 ENCOUNTER — ANESTHESIA (OUTPATIENT)
Dept: SURGERY | Facility: CLINIC | Age: 67
End: 2022-12-05
Payer: COMMERCIAL

## 2022-12-05 ENCOUNTER — HOSPITAL ENCOUNTER (OUTPATIENT)
Facility: CLINIC | Age: 67
Discharge: HOME | End: 2022-12-06
Attending: ORTHOPAEDIC SURGERY | Admitting: ORTHOPAEDIC SURGERY
Payer: COMMERCIAL

## 2022-12-05 ENCOUNTER — APPOINTMENT (OUTPATIENT)
Dept: RADIOLOGY | Facility: CLINIC | Age: 67
End: 2022-12-05
Attending: ORTHOPAEDIC SURGERY
Payer: COMMERCIAL

## 2022-12-05 DIAGNOSIS — Z96.652 HISTORY OF TOTAL KNEE REPLACEMENT, LEFT: Primary | ICD-10-CM

## 2022-12-05 LAB
CREAT SERPL-MCNC: 0.84 MG/DL (ref 0.6–1.1)
ERYTHROCYTE [DISTWIDTH] IN BLOOD BY AUTOMATED COUNT: 13.1 % (ref 10–15)
GFR SERPL CREATININE-BSD FRML MDRD: 76 ML/MIN/1.73M2
HCT VFR BLD AUTO: 36.8 % (ref 35–47)
HGB BLD-MCNC: 12.1 G/DL (ref 11.7–15.7)
MCH RBC QN AUTO: 30.6 PG (ref 26.5–33)
MCHC RBC AUTO-ENTMCNC: 32.9 G/DL (ref 31.5–36.5)
MCV RBC AUTO: 93 FL (ref 78–100)
PLATELET # BLD AUTO: 312 10E3/UL (ref 150–450)
POTASSIUM BLD-SCNC: 4.3 MMOL/L (ref 3.5–5)
RBC # BLD AUTO: 3.96 10E6/UL (ref 3.8–5.2)
WBC # BLD AUTO: 5.3 10E3/UL (ref 4–11)

## 2022-12-05 PROCEDURE — 85027 COMPLETE CBC AUTOMATED: CPT | Performed by: PHYSICIAN ASSISTANT

## 2022-12-05 PROCEDURE — 82565 ASSAY OF CREATININE: CPT | Performed by: PHYSICIAN ASSISTANT

## 2022-12-05 PROCEDURE — 99204 OFFICE O/P NEW MOD 45 MIN: CPT | Performed by: INTERNAL MEDICINE

## 2022-12-05 PROCEDURE — 258N000003 HC RX IP 258 OP 636: Performed by: NURSE ANESTHETIST, CERTIFIED REGISTERED

## 2022-12-05 PROCEDURE — 258N000001 HC RX 258: Performed by: ORTHOPAEDIC SURGERY

## 2022-12-05 PROCEDURE — 250N000011 HC RX IP 250 OP 636: Performed by: NURSE ANESTHETIST, CERTIFIED REGISTERED

## 2022-12-05 PROCEDURE — 250N000011 HC RX IP 250 OP 636: Performed by: PHYSICIAN ASSISTANT

## 2022-12-05 PROCEDURE — 250N000011 HC RX IP 250 OP 636: Performed by: ANESTHESIOLOGY

## 2022-12-05 PROCEDURE — 250N000011 HC RX IP 250 OP 636: Performed by: ORTHOPAEDIC SURGERY

## 2022-12-05 PROCEDURE — 999N000141 HC STATISTIC PRE-PROCEDURE NURSING ASSESSMENT: Performed by: ORTHOPAEDIC SURGERY

## 2022-12-05 PROCEDURE — 250N000013 HC RX MED GY IP 250 OP 250 PS 637: Performed by: ORTHOPAEDIC SURGERY

## 2022-12-05 PROCEDURE — 36415 COLL VENOUS BLD VENIPUNCTURE: CPT | Performed by: PHYSICIAN ASSISTANT

## 2022-12-05 PROCEDURE — 360N000077 HC SURGERY LEVEL 4, PER MIN: Performed by: ORTHOPAEDIC SURGERY

## 2022-12-05 PROCEDURE — 272N000001 HC OR GENERAL SUPPLY STERILE: Performed by: ORTHOPAEDIC SURGERY

## 2022-12-05 PROCEDURE — 250N000009 HC RX 250: Performed by: PHYSICIAN ASSISTANT

## 2022-12-05 PROCEDURE — 999N000065 XR KNEE PORT LEFT 1/2 VIEWS: Mod: LT

## 2022-12-05 PROCEDURE — 710N000010 HC RECOVERY PHASE 1, LEVEL 2, PER MIN: Performed by: ORTHOPAEDIC SURGERY

## 2022-12-05 PROCEDURE — 84132 ASSAY OF SERUM POTASSIUM: CPT | Performed by: PHYSICIAN ASSISTANT

## 2022-12-05 PROCEDURE — C1776 JOINT DEVICE (IMPLANTABLE): HCPCS | Performed by: ORTHOPAEDIC SURGERY

## 2022-12-05 PROCEDURE — 258N000003 HC RX IP 258 OP 636: Performed by: ORTHOPAEDIC SURGERY

## 2022-12-05 PROCEDURE — C1713 ANCHOR/SCREW BN/BN,TIS/BN: HCPCS | Performed by: ORTHOPAEDIC SURGERY

## 2022-12-05 PROCEDURE — 258N000003 HC RX IP 258 OP 636: Performed by: ANESTHESIOLOGY

## 2022-12-05 PROCEDURE — 250N000013 HC RX MED GY IP 250 OP 250 PS 637: Performed by: PHYSICIAN ASSISTANT

## 2022-12-05 PROCEDURE — 370N000017 HC ANESTHESIA TECHNICAL FEE, PER MIN: Performed by: ORTHOPAEDIC SURGERY

## 2022-12-05 PROCEDURE — 250N000009 HC RX 250: Performed by: ANESTHESIOLOGY

## 2022-12-05 DEVICE — TIBIAL BEARING INSERT - CS
Type: IMPLANTABLE DEVICE | Site: KNEE | Status: FUNCTIONAL
Brand: TRIATHLON

## 2022-12-05 DEVICE — PRIMARY TIBIAL BASEPLATE
Type: IMPLANTABLE DEVICE | Site: KNEE | Status: FUNCTIONAL
Brand: TRIATHLON

## 2022-12-05 DEVICE — ASYMMETRIC PATELLA
Type: IMPLANTABLE DEVICE | Site: KNEE | Status: FUNCTIONAL
Brand: TRIATHLON

## 2022-12-05 DEVICE — CRUCIATE RETAINING FEMORAL
Type: IMPLANTABLE DEVICE | Site: KNEE | Status: FUNCTIONAL
Brand: TRIATHLON

## 2022-12-05 DEVICE — SIMPLEX® HV IS A FAST-SETTING ACRYLIC RESIN FOR USE IN BONE SURGERY. MIXING THE TWO SEPARATE STERILE COMPONENTS PRODUCES A DUCTILE BONE CEMENT WHICH, AFTER HARDENING, FIXES THE IMPLANT AND TRANSFERS STRESSES PRODUCED DURING MOVEMENT EVENLY TO THE BONE. SIMPLEX® HV CEMENT POWDER ALSO CONTAINS INSOLUBLE ZIRCONIUM DIOXIDE AS AN X-RAY CONTRAST MEDIUM. SIMPLEX® HV DOES NOT EMIT A SIGNAL AND DOES NOT POSE A SAFETY RISK IN A MAGNETIC RESONANCE ENVIRONMENT.
Type: IMPLANTABLE DEVICE | Site: KNEE | Status: FUNCTIONAL
Brand: SIMPLEX HV

## 2022-12-05 RX ORDER — ACETAMINOPHEN 325 MG/1
650 TABLET ORAL EVERY 4 HOURS PRN
Status: DISCONTINUED | OUTPATIENT
Start: 2022-12-08 | End: 2022-12-06 | Stop reason: HOSPADM

## 2022-12-05 RX ORDER — ACETAMINOPHEN 325 MG/1
975 TABLET ORAL EVERY 8 HOURS
Status: DISCONTINUED | OUTPATIENT
Start: 2022-12-05 | End: 2022-12-06 | Stop reason: HOSPADM

## 2022-12-05 RX ORDER — OXYCODONE HYDROCHLORIDE 5 MG/1
5 TABLET ORAL EVERY 4 HOURS PRN
Status: DISCONTINUED | OUTPATIENT
Start: 2022-12-05 | End: 2022-12-06 | Stop reason: HOSPADM

## 2022-12-05 RX ORDER — ONDANSETRON 4 MG/1
4 TABLET, ORALLY DISINTEGRATING ORAL EVERY 6 HOURS PRN
Status: DISCONTINUED | OUTPATIENT
Start: 2022-12-05 | End: 2022-12-06 | Stop reason: HOSPADM

## 2022-12-05 RX ORDER — FENTANYL CITRATE 50 UG/ML
25 INJECTION, SOLUTION INTRAMUSCULAR; INTRAVENOUS EVERY 5 MIN PRN
Status: DISCONTINUED | OUTPATIENT
Start: 2022-12-05 | End: 2022-12-05 | Stop reason: HOSPADM

## 2022-12-05 RX ORDER — OXYCODONE HYDROCHLORIDE 5 MG/1
5 TABLET ORAL EVERY 4 HOURS PRN
Status: DISCONTINUED | OUTPATIENT
Start: 2022-12-05 | End: 2022-12-05

## 2022-12-05 RX ORDER — AMOXICILLIN 250 MG
1 CAPSULE ORAL 2 TIMES DAILY
Status: DISCONTINUED | OUTPATIENT
Start: 2022-12-05 | End: 2022-12-06 | Stop reason: HOSPADM

## 2022-12-05 RX ORDER — FAMOTIDINE 20 MG/1
20 TABLET, FILM COATED ORAL 2 TIMES DAILY
Status: DISCONTINUED | OUTPATIENT
Start: 2022-12-05 | End: 2022-12-06 | Stop reason: HOSPADM

## 2022-12-05 RX ORDER — SODIUM CHLORIDE, SODIUM LACTATE, POTASSIUM CHLORIDE, CALCIUM CHLORIDE 600; 310; 30; 20 MG/100ML; MG/100ML; MG/100ML; MG/100ML
INJECTION, SOLUTION INTRAVENOUS CONTINUOUS
Status: DISCONTINUED | OUTPATIENT
Start: 2022-12-05 | End: 2022-12-05 | Stop reason: HOSPADM

## 2022-12-05 RX ORDER — ACETAMINOPHEN 500 MG
500-1000 TABLET ORAL EVERY 6 HOURS PRN
COMMUNITY
End: 2023-02-20

## 2022-12-05 RX ORDER — MAGNESIUM SULFATE 4 G/50ML
4 INJECTION INTRAVENOUS ONCE
Status: COMPLETED | OUTPATIENT
Start: 2022-12-05 | End: 2022-12-05

## 2022-12-05 RX ORDER — NALOXONE HYDROCHLORIDE 0.4 MG/ML
0.4 INJECTION, SOLUTION INTRAMUSCULAR; INTRAVENOUS; SUBCUTANEOUS
Status: DISCONTINUED | OUTPATIENT
Start: 2022-12-05 | End: 2022-12-06 | Stop reason: HOSPADM

## 2022-12-05 RX ORDER — TRANEXAMIC ACID 650 MG/1
1950 TABLET ORAL ONCE
Status: COMPLETED | OUTPATIENT
Start: 2022-12-05 | End: 2022-12-05

## 2022-12-05 RX ORDER — ONDANSETRON 2 MG/ML
4 INJECTION INTRAMUSCULAR; INTRAVENOUS EVERY 6 HOURS PRN
Status: DISCONTINUED | OUTPATIENT
Start: 2022-12-05 | End: 2022-12-06 | Stop reason: HOSPADM

## 2022-12-05 RX ORDER — OXYCODONE HYDROCHLORIDE 5 MG/1
5-10 TABLET ORAL EVERY 4 HOURS PRN
Qty: 30 TABLET | Refills: 0 | Status: SHIPPED | OUTPATIENT
Start: 2022-12-05 | End: 2022-12-06

## 2022-12-05 RX ORDER — LIDOCAINE 40 MG/G
CREAM TOPICAL
Status: DISCONTINUED | OUTPATIENT
Start: 2022-12-05 | End: 2022-12-06 | Stop reason: HOSPADM

## 2022-12-05 RX ORDER — METHOCARBAMOL 500 MG/1
500 TABLET, FILM COATED ORAL 4 TIMES DAILY PRN
Qty: 60 TABLET | Refills: 1 | Status: SHIPPED | OUTPATIENT
Start: 2022-12-05 | End: 2022-12-06

## 2022-12-05 RX ORDER — HYDROMORPHONE HCL IN WATER/PF 6 MG/30 ML
0.2 PATIENT CONTROLLED ANALGESIA SYRINGE INTRAVENOUS
Status: DISCONTINUED | OUTPATIENT
Start: 2022-12-05 | End: 2022-12-06 | Stop reason: HOSPADM

## 2022-12-05 RX ORDER — CEFAZOLIN SODIUM 1 G/3ML
1 INJECTION, POWDER, FOR SOLUTION INTRAMUSCULAR; INTRAVENOUS EVERY 8 HOURS
Status: COMPLETED | OUTPATIENT
Start: 2022-12-05 | End: 2022-12-06

## 2022-12-05 RX ORDER — PROCHLORPERAZINE MALEATE 5 MG
5 TABLET ORAL EVERY 6 HOURS PRN
Status: DISCONTINUED | OUTPATIENT
Start: 2022-12-05 | End: 2022-12-06 | Stop reason: HOSPADM

## 2022-12-05 RX ORDER — PROPOFOL 10 MG/ML
INJECTION, EMULSION INTRAVENOUS CONTINUOUS PRN
Status: DISCONTINUED | OUTPATIENT
Start: 2022-12-05 | End: 2022-12-05

## 2022-12-05 RX ORDER — BUPIVACAINE HYDROCHLORIDE 5 MG/ML
INJECTION, SOLUTION EPIDURAL; INTRACAUDAL
Status: COMPLETED | OUTPATIENT
Start: 2022-12-05 | End: 2022-12-05

## 2022-12-05 RX ORDER — LIDOCAINE 40 MG/G
CREAM TOPICAL
Status: DISCONTINUED | OUTPATIENT
Start: 2022-12-05 | End: 2022-12-05 | Stop reason: HOSPADM

## 2022-12-05 RX ORDER — HYDROMORPHONE HCL IN WATER/PF 6 MG/30 ML
0.4 PATIENT CONTROLLED ANALGESIA SYRINGE INTRAVENOUS EVERY 5 MIN PRN
Status: DISCONTINUED | OUTPATIENT
Start: 2022-12-05 | End: 2022-12-05 | Stop reason: HOSPADM

## 2022-12-05 RX ORDER — NALOXONE HYDROCHLORIDE 0.4 MG/ML
0.2 INJECTION, SOLUTION INTRAMUSCULAR; INTRAVENOUS; SUBCUTANEOUS
Status: DISCONTINUED | OUTPATIENT
Start: 2022-12-05 | End: 2022-12-06 | Stop reason: HOSPADM

## 2022-12-05 RX ORDER — ACETAMINOPHEN 325 MG/1
975 TABLET ORAL ONCE
Status: COMPLETED | OUTPATIENT
Start: 2022-12-05 | End: 2022-12-05

## 2022-12-05 RX ORDER — ACETAMINOPHEN 325 MG/1
650 TABLET ORAL EVERY 4 HOURS PRN
Qty: 100 TABLET | Refills: 0 | Status: SHIPPED | OUTPATIENT
Start: 2022-12-05 | End: 2022-12-06

## 2022-12-05 RX ORDER — FENTANYL CITRATE 50 UG/ML
100 INJECTION, SOLUTION INTRAMUSCULAR; INTRAVENOUS
Status: DISCONTINUED | OUTPATIENT
Start: 2022-12-05 | End: 2022-12-05

## 2022-12-05 RX ORDER — BISACODYL 10 MG
10 SUPPOSITORY, RECTAL RECTAL DAILY PRN
Status: DISCONTINUED | OUTPATIENT
Start: 2022-12-05 | End: 2022-12-06 | Stop reason: HOSPADM

## 2022-12-05 RX ORDER — CEFAZOLIN SODIUM/WATER 2 G/20 ML
2 SYRINGE (ML) INTRAVENOUS SEE ADMIN INSTRUCTIONS
Status: DISCONTINUED | OUTPATIENT
Start: 2022-12-05 | End: 2022-12-05 | Stop reason: HOSPADM

## 2022-12-05 RX ORDER — ONDANSETRON 2 MG/ML
INJECTION INTRAMUSCULAR; INTRAVENOUS PRN
Status: DISCONTINUED | OUTPATIENT
Start: 2022-12-05 | End: 2022-12-05

## 2022-12-05 RX ORDER — OXYCODONE HYDROCHLORIDE 5 MG/1
10 TABLET ORAL EVERY 4 HOURS PRN
Status: DISCONTINUED | OUTPATIENT
Start: 2022-12-05 | End: 2022-12-06 | Stop reason: HOSPADM

## 2022-12-05 RX ORDER — OXYCODONE HYDROCHLORIDE 5 MG/1
10 TABLET ORAL EVERY 4 HOURS PRN
Status: DISCONTINUED | OUTPATIENT
Start: 2022-12-05 | End: 2022-12-05

## 2022-12-05 RX ORDER — ONDANSETRON 4 MG/1
4 TABLET, ORALLY DISINTEGRATING ORAL EVERY 30 MIN PRN
Status: DISCONTINUED | OUTPATIENT
Start: 2022-12-05 | End: 2022-12-05 | Stop reason: HOSPADM

## 2022-12-05 RX ORDER — DIPHENHYDRAMINE HCL 12.5 MG/5ML
12.5 SOLUTION ORAL EVERY 6 HOURS PRN
Status: DISCONTINUED | OUTPATIENT
Start: 2022-12-05 | End: 2022-12-06 | Stop reason: HOSPADM

## 2022-12-05 RX ORDER — SODIUM CHLORIDE, SODIUM LACTATE, POTASSIUM CHLORIDE, CALCIUM CHLORIDE 600; 310; 30; 20 MG/100ML; MG/100ML; MG/100ML; MG/100ML
INJECTION, SOLUTION INTRAVENOUS CONTINUOUS
Status: DISCONTINUED | OUTPATIENT
Start: 2022-12-05 | End: 2022-12-06 | Stop reason: HOSPADM

## 2022-12-05 RX ORDER — FENTANYL CITRATE 50 UG/ML
50 INJECTION, SOLUTION INTRAMUSCULAR; INTRAVENOUS EVERY 5 MIN PRN
Status: DISCONTINUED | OUTPATIENT
Start: 2022-12-05 | End: 2022-12-05 | Stop reason: HOSPADM

## 2022-12-05 RX ORDER — FENTANYL CITRATE 50 UG/ML
50 INJECTION, SOLUTION INTRAMUSCULAR; INTRAVENOUS
Status: DISCONTINUED | OUTPATIENT
Start: 2022-12-05 | End: 2022-12-05

## 2022-12-05 RX ORDER — POLYETHYLENE GLYCOL 3350 17 G/17G
17 POWDER, FOR SOLUTION ORAL DAILY
Status: DISCONTINUED | OUTPATIENT
Start: 2022-12-06 | End: 2022-12-06 | Stop reason: HOSPADM

## 2022-12-05 RX ORDER — DEXAMETHASONE SODIUM PHOSPHATE 10 MG/ML
INJECTION, SOLUTION INTRAMUSCULAR; INTRAVENOUS PRN
Status: DISCONTINUED | OUTPATIENT
Start: 2022-12-05 | End: 2022-12-05

## 2022-12-05 RX ORDER — CEFAZOLIN SODIUM/WATER 2 G/20 ML
2 SYRINGE (ML) INTRAVENOUS
Status: COMPLETED | OUTPATIENT
Start: 2022-12-05 | End: 2022-12-05

## 2022-12-05 RX ORDER — HALOPERIDOL 5 MG/ML
1 INJECTION INTRAMUSCULAR
Status: DISCONTINUED | OUTPATIENT
Start: 2022-12-05 | End: 2022-12-05 | Stop reason: HOSPADM

## 2022-12-05 RX ORDER — HYDROMORPHONE HCL IN WATER/PF 6 MG/30 ML
0.2 PATIENT CONTROLLED ANALGESIA SYRINGE INTRAVENOUS EVERY 5 MIN PRN
Status: DISCONTINUED | OUTPATIENT
Start: 2022-12-05 | End: 2022-12-05 | Stop reason: HOSPADM

## 2022-12-05 RX ORDER — IBUPROFEN 200 MG
600 TABLET ORAL EVERY 8 HOURS PRN
COMMUNITY
End: 2023-02-20

## 2022-12-05 RX ORDER — BUPIVACAINE HYDROCHLORIDE 7.5 MG/ML
INJECTION, SOLUTION INTRASPINAL
Status: COMPLETED | OUTPATIENT
Start: 2022-12-05 | End: 2022-12-05

## 2022-12-05 RX ORDER — METHOCARBAMOL 500 MG/1
500 TABLET, FILM COATED ORAL EVERY 6 HOURS PRN
Status: DISCONTINUED | OUTPATIENT
Start: 2022-12-05 | End: 2022-12-06 | Stop reason: HOSPADM

## 2022-12-05 RX ORDER — ONDANSETRON 2 MG/ML
4 INJECTION INTRAMUSCULAR; INTRAVENOUS EVERY 30 MIN PRN
Status: DISCONTINUED | OUTPATIENT
Start: 2022-12-05 | End: 2022-12-05 | Stop reason: HOSPADM

## 2022-12-05 RX ORDER — AMOXICILLIN 250 MG
1-2 CAPSULE ORAL 2 TIMES DAILY
Qty: 30 TABLET | Refills: 0
Start: 2022-12-05 | End: 2023-02-20

## 2022-12-05 RX ORDER — HYDROMORPHONE HCL IN WATER/PF 6 MG/30 ML
0.4 PATIENT CONTROLLED ANALGESIA SYRINGE INTRAVENOUS
Status: DISCONTINUED | OUTPATIENT
Start: 2022-12-05 | End: 2022-12-06 | Stop reason: HOSPADM

## 2022-12-05 RX ADMIN — ACETAMINOPHEN 975 MG: 325 TABLET ORAL at 21:19

## 2022-12-05 RX ADMIN — FAMOTIDINE 20 MG: 20 TABLET ORAL at 19:52

## 2022-12-05 RX ADMIN — ACETAMINOPHEN 975 MG: 325 TABLET ORAL at 10:48

## 2022-12-05 RX ADMIN — Medication 2 G: at 11:40

## 2022-12-05 RX ADMIN — DEXAMETHASONE SODIUM PHOSPHATE 10 MG: 10 INJECTION, SOLUTION INTRAMUSCULAR; INTRAVENOUS at 11:56

## 2022-12-05 RX ADMIN — FENTANYL CITRATE 50 MCG: 50 INJECTION INTRAMUSCULAR; INTRAVENOUS at 11:21

## 2022-12-05 RX ADMIN — PROPOFOL 100 MCG/KG/MIN: 10 INJECTION, EMULSION INTRAVENOUS at 11:56

## 2022-12-05 RX ADMIN — ASPIRIN 325 MG: 325 TABLET, COATED ORAL at 19:52

## 2022-12-05 RX ADMIN — PHENYLEPHRINE HYDROCHLORIDE 0.4 MCG/KG/MIN: 10 INJECTION INTRAVENOUS at 11:56

## 2022-12-05 RX ADMIN — BUPIVACAINE HYDROCHLORIDE 15 ML: 5 INJECTION, SOLUTION EPIDURAL; INTRACAUDAL; PERINEURAL at 11:20

## 2022-12-05 RX ADMIN — CEFAZOLIN 1 G: 1 INJECTION, POWDER, FOR SOLUTION INTRAMUSCULAR; INTRAVENOUS at 19:52

## 2022-12-05 RX ADMIN — TRANEXAMIC ACID 1950 MG: 650 TABLET ORAL at 10:49

## 2022-12-05 RX ADMIN — SODIUM CHLORIDE, POTASSIUM CHLORIDE, SODIUM LACTATE AND CALCIUM CHLORIDE: 600; 310; 30; 20 INJECTION, SOLUTION INTRAVENOUS at 17:18

## 2022-12-05 RX ADMIN — LIDOCAINE HYDROCHLORIDE 20 MG: 10 INJECTION, SOLUTION INFILTRATION; PERINEURAL at 11:56

## 2022-12-05 RX ADMIN — MIDAZOLAM HYDROCHLORIDE 2 MG: 1 INJECTION, SOLUTION INTRAMUSCULAR; INTRAVENOUS at 11:20

## 2022-12-05 RX ADMIN — BUPIVACAINE HYDROCHLORIDE IN DEXTROSE 1.8 ML: 7.5 INJECTION, SOLUTION SUBARACHNOID at 11:54

## 2022-12-05 RX ADMIN — ONDANSETRON 4 MG: 2 INJECTION INTRAMUSCULAR; INTRAVENOUS at 11:56

## 2022-12-05 RX ADMIN — SODIUM CHLORIDE, POTASSIUM CHLORIDE, SODIUM LACTATE AND CALCIUM CHLORIDE: 600; 310; 30; 20 INJECTION, SOLUTION INTRAVENOUS at 10:22

## 2022-12-05 RX ADMIN — MAGNESIUM SULFATE HEPTAHYDRATE 4 G: 4 INJECTION, SOLUTION INTRAVENOUS at 10:23

## 2022-12-05 ASSESSMENT — ACTIVITIES OF DAILY LIVING (ADL)
ADLS_ACUITY_SCORE: 23
ADLS_ACUITY_SCORE: 23
ADLS_ACUITY_SCORE: 22
ADLS_ACUITY_SCORE: 23

## 2022-12-05 NOTE — ANESTHESIA PROCEDURE NOTES
"Intrathecal injection Procedure Note    Pre-Procedure   Staff -        Anesthesiologist:  Amando Rincon MD       Performed By: anesthesiologist       Location: OR       Procedure Start/Stop Times: 12/5/2022 11:54 AM and 12/5/2022 11:56 AM       Pre-Anesthestic Checklist: patient identified, IV checked, risks and benefits discussed, informed consent, monitors and equipment checked, pre-op evaluation, at physician/surgeon's request and post-op pain management  Timeout:       Correct Patient: Yes        Correct Procedure: Yes        Correct Site: Yes        Correct Position: Yes   Procedure Documentation  Procedure: intrathecal injection       Patient Position: sitting       Patient Prep/Sterile Barriers: sterile gloves, mask, patient draped       Skin prep: Chloraprep       Insertion Site: L3-4. (midline approach).       Needle Gauge: 24.        Needle Length (Inches): 4        Spinal Needle Type: Pencan       Introducer used       Introducer: 20 G       # of attempts: 1 and  # of redirects:  0    Assessment/Narrative         Paresthesias: No.       CSF fluid: clear.    Medication(s) Administered   0.75% Hyperbaric Bupivacaine (Intrathecal) - Intrathecal   1.8 mL - 12/5/2022 11:54:00 AM  Medication Administration Time: 12/5/2022 11:54 AM     Comments:  Patient has significant scoliosis with curvature to the right. Single pass spinal, no complications. No bleeding or paresthesias.       FOR [facility] ([facility] ONLY:   Pain Team Contact information: please page the Pain Team Via [employer]. Search \"Pain\". During daytime hours, please page the attending first. At night please page the resident first.    "

## 2022-12-05 NOTE — BRIEF OP NOTE
[facility]    Brief Operative Note    Pre-operative diagnosis: Osteoarthritis [M19.90]  Post-operative diagnosis Same as pre-operative diagnosis    Procedure: Procedure(s):  LEFT TOTAL KNEE ARTHROPLASTY  Surgeon: Surgeon(s) and Role:     * Daren Coleman MD - Primary     * Orion Ocampo PA-C - Assisting  Anesthesia: Choice   Estimated Blood Loss: Less than 50 ml    Drains: Hemovac  Specimens:   ID Type Source Tests Collected by Time Destination   A : Bone fragments from left total knee discarded in OR according to hospital policy. Bone Fragments Bone OR DOCUMENTATION ONLY Daren Coleman MD 12/5/2022 12:52 PM      Findings:   None.  Complications: None.  Implants:   Implant Name Type Inv. Item Serial No.  Lot No. LRB No. Used Action   BONE CEMENT RADIOPAQUE SIMPLEX HV FULL DOSE 6194-1-001 - [id number] Cement, Bone BONE CEMENT RADIOPAQUE SIMPLEX HV FULL DOSE 6194-1-001  [employer] [id number] Left 1 Implanted   IMP COMP PATELLA STRK TRI ASYM X3 [id number] - [id number] Total Joint Component/Insert IMP COMP PATELLA STRK TRI ASYM X3 [id number]  [employer] [id number] Left 1 Implanted   IMP COMP FEM STRK TRIATHLN CR LT 3 5510-F-301 - [id number] Total Joint Component/Insert IMP COMP FEM STRK TRIATHLN CR LT 3 5510-F-301  [employer] [id number] Left 1 Implanted   IMP BASEPLATE TIBIAL HOWM TRI 2 5520-B-200 - [id number] Total Joint Component/Insert IMP BASEPLATE TIBIAL HOWM TRI 2 5520-B-200  [employer] [id number] Left 1 Implanted   INSERT TIB 2 10MM KN X3 CNDRL STAB BRNG TRTHLN STRL LF - [id number] Total Joint Component/Insert INSERT TIB 2 10MM KN X3 CNDRL STAB BRNG TRTHLN STRL LF  [employer] [id number] Left 1 Implanted

## 2022-12-05 NOTE — ANESTHESIA POSTPROCEDURE EVALUATION
Patient: Eleazar Cortez    Procedure: Procedure(s):  LEFT TOTAL KNEE ARTHROPLASTY       Anesthesia Type:  Spinal    Note:  Disposition: Admission   Postop Pain Control: Uneventful            Sign Out: Well controlled pain   PONV: No   Neuro/Psych: Uneventful            Sign Out: Acceptable/Baseline neuro status   Airway/Respiratory: Uneventful            Sign Out: Acceptable/Baseline resp. status   CV/Hemodynamics: Uneventful            Sign Out: Acceptable CV status; No obvious hypovolemia; No obvious fluid overload   Other NRE: NONE   DID A NON-ROUTINE EVENT OCCUR? No           Last vitals:  Vitals Value Taken Time   /64 12/05/22 1440   Temp     Pulse 80 12/05/22 1443   Resp 28 12/05/22 1443   SpO2 96 % 12/05/22 1443   Vitals shown include unvalidated device data.    Electronically Signed By: ISAAC LARA MD  December 5, 2022  2:45 PM

## 2022-12-05 NOTE — ANESTHESIA PREPROCEDURE EVALUATION
Anesthesia Pre-Procedure Evaluation    Patient: Eleazar Cortez   MRN: [id number] : 1955        Procedure : Procedure(s):  LEFT TOTAL KNEE ARTHROPLASTY          Past Medical History:   Diagnosis Date     Arthritis     in knee     BCC (basal cell carcinoma), face     right lateral nose.  removed via Mohs     Hypothyroidism 10/11/2012     Osteoporosis      Other malignant lymphomas, unspecified site, extranodal and solid organ sites     Non-Hodgkin's Lymphoma     Scoliosis (and kyphoscoliosis), idiopathic      Uncomplicated asthma childhood    Rarely... haven't noticed for a long time      Past Surgical History:   Procedure Laterality Date     BIOPSY  , ?    leg, nose     COLONOSCOPY  2012    Procedure: COLONOSCOPY;  Colonoscopy;  Surgeon: Drew Perdue MD;  Location: [facility]     ORTHOPEDIC SURGERY Left ,     broke radius by wrist... plate and 9 screws put in, arthrosc     SURGICAL HISTORY OF -   2001    Lymph Node Excision on left leg     SURGICAL HISTORY OF -   2009    BCC removal right nasal area     ZZC TOTAL KNEE ARTHROPLASTY Right 2015      Allergies   Allergen Reactions     Codeine Difficulty breathing     Also felt light headed.  After her arthroscopy 16-18 hour after surgery ( had been taking the Tylenol #3 ) did have an episode with lightheadedness and difficulty breathing/. Also felt like her neck has gotten stiff, couldn't move.  Lasting less than 5 min.   Has been able to take Tylenol since then without problems.       Dye [Contrast Dye] Other (See Comments)     congestion      Social History     Tobacco Use     Smoking status: Never     Smokeless tobacco: Never   Substance Use Topics     Alcohol use: No      Wt Readings from Last 1 Encounters:   22 67.6 kg (149 lb 1.6 oz)        Anesthesia Evaluation   Pt has had prior anesthetic.     No history of anesthetic complications       ROS/MED HX  ENT/Pulmonary:     (+) Intermittent, asthma      Neurologic: Comment: Bilateral feet neuropathy of unknown etiology. L>R. Left foot has persistent numbness that radiates to lateral ankle.      Cardiovascular:  - neg cardiovascular ROS     METS/Exercise Tolerance:     Hematologic:  - neg hematologic  ROS     Musculoskeletal: Comment: Scoliosis   (+) arthritis,     GI/Hepatic:    (-) GERD   Renal/Genitourinary:  - neg Renal ROS     Endo:     (+) thyroid problem, hypothyroidism,     Psychiatric/Substance Use:  - neg psychiatric ROS     Infectious Disease:  - neg infectious disease ROS     Malignancy: Comment: Hx of lymphoma in remission since 2002      Other:  - neg other ROS          Physical Exam    Airway  airway exam normal      Mallampati: II   TM distance: > 3 FB   Neck ROM: full   Mouth opening: > 3 cm    Respiratory Devices and Support         Dental  no notable dental history         Cardiovascular   cardiovascular exam normal       Rhythm and rate: regular and normal     Pulmonary   pulmonary exam normal        breath sounds clear to auscultation           OUTSIDE LABS:  CBC:   Lab Results   Component Value Date    WBC 5.3 12/05/2022    WBC 5.6 07/14/2022    HGB 12.1 12/05/2022    HGB 12.6 07/14/2022    HCT 36.8 12/05/2022    HCT 38.6 07/14/2022     12/05/2022     07/14/2022     BMP:   Lab Results   Component Value Date     07/14/2022     02/10/2021    POTASSIUM 4.3 12/05/2022    POTASSIUM 4.5 07/14/2022    CHLORIDE 106 07/14/2022    CHLORIDE 105 02/10/2021    CO2 27 07/14/2022    CO2 28 02/10/2021    BUN 18 07/14/2022    BUN 17 02/10/2021    CR 0.84 12/05/2022    CR 0.90 07/14/2022    GLC 91 07/14/2022    GLC 88 02/10/2021     COAGS: No results found for: PTT, INR, FIBR  POC: No results found for: BGM, HCG, HCGS  HEPATIC:   Lab Results   Component Value Date    ALBUMIN 3.8 07/14/2022    PROTTOTAL 7.2 07/14/2022    ALT 30 07/14/2022    AST 35 07/14/2022    ALKPHOS 44 07/14/2022    BILITOTAL 0.6 07/14/2022     OTHER:   Lab Results    Component Value Date    ABE 8.9 07/14/2022    LIPASE 145 12/03/2009    AMYLASE 81 12/03/2009    TSH 3.96 07/14/2022    T4 1.13 08/30/2018    T3 103 08/30/2018       Anesthesia Plan    ASA Status:  2   NPO Status:  NPO Appropriate    Anesthesia Type: Spinal.              Consents    Anesthesia Plan(s) and associated risks, benefits, and realistic alternatives discussed. Questions answered and patient/representative(s) expressed understanding.    - Discussed:     - Discussed with:  Patient, Spouse         Postoperative Care    Pain management: IV analgesics, Oral pain medications, Multi-modal analgesia, Peripheral nerve block (Single Shot).   PONV prophylaxis: Ondansetron (or other 5HT-3), Dexamethasone or Solumedrol, Droperidol or Haldol     Comments:    Other Comments: Discussed risks and benefits of a general anesthetic vs a spinal anesthetic. Risks of spinal include bleeding, infection, damage to neighboring structures, worsening underlying nerve damage and headache. Discussed the low risk of worsening bilateral feet nerve abnormality given unknown etiology. Pt understands the risk and benefits and agrees to spinal anesthetic.            ISAAC LARA MD

## 2022-12-05 NOTE — PHARMACY-ADMISSION MEDICATION HISTORY
Pharmacy Note - Admission Medication History    Pertinent Provider Information: n/a   ______________________________________________________________________    Prior To Admission (PTA) med list completed and updated in EMR.       PTA Med List   Medication Sig Last Dose     acetaminophen (TYLENOL) 500 MG tablet Take 500-1,000 mg by mouth every 6 hours as needed for mild pain 12/4/2022     Calcium Citrate-Vitamin D (CALCIUM + D PO) Take 1 tablet by mouth daily Past Month     IBANdronate (BONIVA) 150 MG tablet Take 1 tablet (150 mg) by mouth every 30 days 11/30/2022     ibuprofen (ADVIL/MOTRIN) 200 MG tablet Take 600 mg by mouth every 8 hours as needed for pain > 1 week     levothyroxine (SYNTHROID/LEVOTHROID) 50 MCG tablet Take 1 tablet (50 mcg) by mouth daily 12/4/2022     UNABLE TO FIND MEDICATION NAME: Melaleuca PM - Longevity  2 CardiOmegaEPA (Omega 3 Fishoils - 1000 mg EPG and 100 mg DHA)  2 Vitality Multivitamin and Mineral (Iron, Vitamin K, Folic acid, Biotin, Calcium)  2 CellWise Antioxidant (Olive extract, grape seed extract, Vitamin C, carotenoids)  2 ProveXCV (Blood pressure support)  2 RecoverAl (Inflammation)  1 Florify (acid resistance, probiotic)  -1 Vitality [place] (omega 3, Brain eye and heart)  -2 Acuity [place] (Brain health)  -1 K2-03 (Redirects calcium bone health)  -1Replenex Extra strength   -1 Nutraview (Vision- macular/lens health)  1 CoQ10 +Cellular Energy Support (antioxidants) 11/28/2022       Information source(s): [facility]Everywhere[facility]    Method of interview communication: in-person    Patient was asked about OTC/herbal products specifically.  PTA med list reflects this.    Based on the pharmacist's assessment, the PTA med list information appears reliable    Allergies were reviewed, assessed, and updated with the patient.      Patient does not use any multi-dose medications prior to admission.     Thank you for the opportunity to participate in the care of this  patient.      Fiorella Dalton [facility]     12/5/2022     10:42 AM

## 2022-12-05 NOTE — ANESTHESIA PROCEDURE NOTES
Adductor canal Procedure Note    Pre-Procedure   Staff -        Anesthesiologist:  Amando Rincon MD       Performed By: anesthesiologist       Location: pre-op       Procedure Start/Stop Times: 12/5/2022 11:20 AM and 12/5/2022 11:24 AM       Pre-Anesthestic Checklist: patient identified, IV checked, site marked, risks and benefits discussed, informed consent, monitors and equipment checked, pre-op evaluation, at physician/surgeon's request and post-op pain management  Timeout:       Correct Patient: Yes        Correct Procedure: Yes        Correct Site: Yes        Correct Position: Yes        Correct Laterality: Yes        Site Marked: Yes  Procedure Documentation  Procedure: Adductor canal       Laterality: left       Patient Position: supine       Patient Prep/Sterile Barriers: sterile gloves, mask       Skin prep: Chloraprep       Needle Type: insulated       Needle Gauge: 20.        Needle Length (Inches): 4        Ultrasound guided       1. Ultrasound was used to identify targeted nerve, plexus, vascular marker, or fascial plane and place a needle adjacent to it in real-time.       2. Ultrasound was used to visualize the spread of anesthetic in close proximity to the above referenced structure.       3. A permanent image is entered into the patient's record.       4. The visualized anatomic structures appeared normal.       5. There were no apparent abnormal pathologic findings.    Assessment/Narrative         The placement was negative for: blood aspirated, painful injection and site bleeding       Paresthesias: No.       Bolus given via needle..        Secured via.        Insertion/Infusion Method: Single Shot       Complications: none       Injection made incrementally with aspirations every 5 mL.    Medication(s) Administered   Bupivacaine 0.5% PF (Infiltration) - Infiltration   15 mL - 12/5/2022 11:20:00 AM  Medication Administration Time: 12/5/2022 11:20 AM      FOR [facility] ([facility]) ONLY:   Pain  "Team Contact information: please page the Pain Team Via [facility]. Search \"Pain\". During daytime hours, please page the attending first. At night please page the resident first.    "

## 2022-12-05 NOTE — CONSULTS
[facility]  Consult Note - Hospitalist Service  Date of Admission:  12/5/2022  Consult Requested by: Orthopedic Surgery  Reason for Consult: Medical co-management    Assessment & Plan   Eleazar Cortez is a 67 year old female admitted on 12/5/2022. She has history of hypoTH and OA of left knee. Presents for left knee arthroplasty, completed 12/5. Medicine consulted for medical co-mgmt.    Primary osteoarthritis of left knee  Not responding to conservative treatment, further surgical intervention necessary.      Hypothyroidism, unspecified type  C/w levothyroxine 50mcg daily     The patient's care was discussed with the Bedside Nurse, Patient and Primary team.    Skye Reddy MD  [facility]  Securely message with the Vocera Web Console (learn more here)  Text page via [facility] Paging/Directory       Hospitalist Service  Skye Reddy MD    Chief Complaint   Left knee OA sp arthroplasty    History of Present Illness   Eleazar Cortez is a 67 year old female admitted on 12/5/2022. She has history of hypoTH and OA of left knee. Presents for left knee arthroplasty, completed 12/5. Medicine consulted for medical co-mgmt.    Denies any fevers, chills, sweats, nausea, vomiting, diarrhea, chest pain, SOB.    Takes all meds as prescribed.     Doing okay post op. Discussed importance of pain control, bowel regimen, and incentive spirometry.    Pre-operative diagnosis:         Osteoarthritis [M19.90]  Post-operative diagnosis        Same as pre-operative diagnosis  Procedure:      Procedure(s):  LEFT TOTAL KNEE ARTHROPLASTY    Review of Systems   The 10 point Review of Systems is negative other than noted in the HPI or here.     Past Medical History    I have reviewed this patient's medical history and updated it with pertinent information if needed.   Past Medical History:   Diagnosis Date     Arthritis 2014    in knee     BCC (basal cell carcinoma), face 2009    right  "lateral nose.  removed via Mohs     Hypothyroidism 10/11/2012     Osteoporosis      Other malignant lymphomas, unspecified site, extranodal and solid organ sites     Non-Hodgkin's Lymphoma     Scoliosis (and kyphoscoliosis), idiopathic      Uncomplicated asthma childhood    Rarely... haven't noticed for a long time       Past Surgical History   I have reviewed this patient's surgical history and updated it with pertinent information if needed.  Past Surgical History:   Procedure Laterality Date     BIOPSY  , ?    leg, nose     COLONOSCOPY  2012    Procedure: COLONOSCOPY;  Colonoscopy;  Surgeon: Drew Perdue MD;  Location: [facility]     ORTHOPEDIC SURGERY Left ,     broke radius by wrist... plate and 9 screws put in, arthrosc     SURGICAL HISTORY OF -   2001    Lymph Node Excision on left leg     SURGICAL HISTORY OF -   2009    BCC removal right nasal area     ZZC TOTAL KNEE ARTHROPLASTY Right 2015       Social History   I have reviewed this patient's social history and updated it with pertinent information if needed.  Social History     Tobacco Use     Smoking status: Never     Smokeless tobacco: Never   Substance Use Topics     Alcohol use: No     Drug use: No       Family History   I have reviewed this patient's family history and updated it with pertinent information if needed.  Family History   Problem Relation Age of Onset     C.A.D. Father         heart problems at age 40     Arthritis Father         bilat knee replacements \"bowlegged\"     Coronary Artery Disease Father         heart attack 43 years ago     Asthma Father      Lipids Mother      Arthritis Mother         knees/fingers     Hypertension Mother      Other Cancer Mother         uterine cancer, 2016     Cardiovascular Paternal Grandfather      Coronary Artery Disease Paternal Grandfather          of heart attack     Neurologic Disorder Maternal Grandfather         park     Diabetes Maternal Aunt      " "Osteoporosis Paternal Grandmother      Arthritis Son         spondylolysis     Coronary Artery Disease Other         Congestive heart failure     Asthma Other         paternal aunt, adult onset     Asthma Other         paternal aunt       Medications   I have reviewed this patient's current medications    Allergies   Allergies   Allergen Reactions     Codeine Difficulty breathing     Also felt light headed.  After her arthroscopy 16-18 hour after surgery ( had been taking the Tylenol #3 ) did have an episode with lightheadedness and difficulty breathing/. Also felt like her neck has gotten stiff, couldn't move.  Lasting less than 5 min.   Has been able to take Tylenol since then without problems.       Dye [Contrast Dye] Other (See Comments)     congestion       Physical Exam   Vital Signs: Temp: 98.4  F (36.9  C) Temp src: Temporal BP: 107/60 Pulse: 78   Resp: 17 SpO2: 96 % O2 Device: None (Room air) Oxygen Delivery: 6 LPM  Weight: 149 lbs 1.6 oz    General: No acute distress, breathing comfortably on room air  Neuro: EOMI, PERRLA. Facial muscles symmetric, strength/sensation grossly intact.  HEENT: Anicteric sclera. Oropharynx is clear. No lymphadenopathy.  Chest/Lungs: No accessory respiratory muscle use. Adequate air movement throughout. CTAB.  CV: Normal rate, regular rhythm. nl S1/S2. No m/r/g. Cap refill &lt;2s.  Abd: BS+. Soft, NTND.  Ext: Warm, well-perfused. Strong distal pulses.       Data   Results for orders placed or performed during the hospital encounter of 12/05/22 (from the past 24 hour(s))   POC US Guidance Needle Placement    Narrative    Ultrasound was performed as guidance to an anesthesia procedure.  Click   \"PACS images\" hyperlink below to view any stored images.  For specific   procedure details, view procedure note authored by anesthesia.   CBC with platelets   Result Value Ref Range    WBC Count 5.3 4.0 - 11.0 10e3/uL    RBC Count 3.96 3.80 - 5.20 10e6/uL    Hemoglobin 12.1 11.7 - 15.7 " g/dL    Hematocrit 36.8 35.0 - 47.0 %    MCV 93 78 - 100 fL    MCH 30.6 26.5 - 33.0 pg    MCHC 32.9 31.5 - 36.5 g/dL    RDW 13.1 10.0 - 15.0 %    Platelet Count 312 150 - 450 10e3/uL   Potassium   Result Value Ref Range    Potassium 4.3 3.5 - 5.0 mmol/L   Creatinine   Result Value Ref Range    Creatinine 0.84 0.60 - 1.10 mg/dL    GFR Estimate 76 >60 mL/min/1.73m2   XR Knee Port Left 1/2 Views    Narrative    EXAM: XR KNEE PORT LEFT 1/2 VIEWS  LOCATION: [facility]  DATE/TIME: 12/5/2022 1:50 PM    INDICATION: Post Op Total Knee  COMPARISON: None.      Impression    IMPRESSION: Postop changes of a left total knee arthroplasty with patellar resurfacing. Surgical drain in place.

## 2022-12-05 NOTE — OP NOTE
Total Knee Arthroplasty Operative Note        PLAN:  Weight bearing status: Weight bearing as tolerated   Activity: Activity as tolerated  Patient may move about with assist as indicated or with supervision   Anticoagulation plan:                ASA  325mg PO QD  for 42 days  Follow up plan                           Follow up in 2 week(s)        Name: Eleazar Cortez    PCP: Cheryl Serrano    Procedure Date: 12/5/2022    Pre-operative diagnosis: Osteoarthritis [M19.90]   Post-operative diagnosis: Same   Procedure: Total knee arthoplasty (Left)   Surgeon: Daren Coleman MD     Assistant(s): Orion Ocampo PA-C   Anesthesia: Spinal Anesthesia   Estimated blood loss: Less than 50 ml   Drains: Hemovac   Specimens: None       Findings: See full dictated operative note for details   Complications: None       Comments: See dictated operative report for full details       Indications:    The patient has experienced progressive left knee pain despite use of  Analgesics, NSAID's, Injection therapy and physical therapy. Because of the failure of these therapies to control symptoms and limited walking, night pain and altered activities the patient has decided to move ahead with joint replacement surgery.    Procedure and Findings:    After being informed of risks, benefits, alternatives to the procedure, patient desired to proceed, brought to the operating suite where they were placed under spinal anesthetic. Patient received 2 grams of intravenous Ancef.  Orion Ocampo PA-C was present for the entire length of the case for the purposes of proper patient positioning, surgical exposure, and patient safety. A time-out verification step was completed.    Examination of the joint surfaces demonstrated full thickness cartilage loss involving themedial compartments of the Left knee..    Attention was directed towards the patella. A midline incision, vastus splitting minimally invasive approach was utilized. The patella was  everted. It was measured at 32 mm. It was resected, remeasured and a 32 mm asymmetric patella was positioned. A protector plate was placed on the patella. Attention was directed toward the distal femur. IM guider marilyn was placed. An 8 mm 5 degree distal femoral cut was completed. The IM guide marilyn was then placed in the tibia. External guide marilyn and tower were utilized and 9 mm button stylus was referenced off the lateral tibial plateau and cuts were completed. The tibia was sized to a 2. Attention was directed towards the distal femur. It was sized to a 3. The 4-in-1 cutting block was placed along the epicondylar axis. It was secured with 2 pins, anterior, posterior and chamfer cuts were completed. Soft tissue balancing was then carried out. Medial release was completed. Ultimately a 10 mm CS insert gave excellent range of motion and stability throughout the range of motion. Patella was noted to track symmetrically throughout the range to motion. The tibial tray rotation was marked, size was verified, it was secured with 2 pins and punched. Trial components were then removed. The cancellous surfaces were pulsatile lavaged. One batch of Simplex cement was mixed under vacuum. The tibia, femur and patella were sequentially cemented in place. The knee was held in extension with axial compression until the cement had hardened. The 10 mm insert was ultimately selected and secured Care was taken to remove any excess cement. Joint capsular injection with anesthetic solution was performed. Excellent range of motion and stability was demonstrated. A None drain was placed. The joint was copiously irrigated. Joint capsules were closed with interrupted number 1 running Stratafix. Subcutaneous tissues were closed with 2-0 Vicryl and skin was closed with 3-0 running Stratifix for wound closure and Aquacell. A sterile dressing was applied. Patient tolerated the procedure well without complication and returned to the PAR in stable  condition.      Daren Coleman MD    Date: 12/5/2022 Time: 1:12 PM  ?    CONFIDENTIALITY NOTICE This message and any included attachments are from [facility] and are intended only for the addressee. The information contained in this message is confidential and may constitute inside or non-public information under international, federal, or state securities laws. Unauthorized forwarding, printing, copying, distribution, or use of such information is strictly prohibited and may be unlawful. If you are not the addressee, please promptly delete this message and notify the sender of the delivery error by e-mail.

## 2022-12-05 NOTE — ANESTHESIA CARE TRANSFER NOTE
Patient: Eleazar Cortez    Procedure: Procedure(s):  LEFT TOTAL KNEE ARTHROPLASTY       Diagnosis: Osteoarthritis [M19.90]  Diagnosis Additional Information: No value filed.    Anesthesia Type:   Spinal     Note:    Oropharynx: oropharynx clear of all foreign objects  Level of Consciousness: awake  Oxygen Supplementation: face mask  Level of Supplemental Oxygen (L/min / FiO2): 6  Independent Airway: airway patency satisfactory and stable  Dentition: dentition unchanged  Vital Signs Stable: post-procedure vital signs reviewed and stable  Report to RN Given: handoff report given  Patient transferred to: PACU    Handoff Report: Identifed the Patient, Identified the Reponsible Provider, Reviewed the pertinent medical history, Discussed the surgical course, Reviewed Intra-OP anesthesia mangement and issues during anesthesia, Set expectations for post-procedure period and Allowed opportunity for questions and acknowledgement of understanding      Vitals:  Vitals Value Taken Time   /53 12/05/22 1323   Temp     Pulse 82 12/05/22 1324   Resp 25 12/05/22 1324   SpO2 99 % 12/05/22 1324   Vitals shown include unvalidated device data.    Electronically Signed By: SINGH Jeter CRNA  December 5, 2022  1:25 PM

## 2022-12-06 ENCOUNTER — APPOINTMENT (OUTPATIENT)
Dept: OCCUPATIONAL THERAPY | Facility: CLINIC | Age: 67
End: 2022-12-06
Attending: ORTHOPAEDIC SURGERY
Payer: COMMERCIAL

## 2022-12-06 ENCOUNTER — APPOINTMENT (OUTPATIENT)
Dept: PHYSICAL THERAPY | Facility: CLINIC | Age: 67
End: 2022-12-06
Attending: ORTHOPAEDIC SURGERY
Payer: COMMERCIAL

## 2022-12-06 VITALS
TEMPERATURE: 98.2 F | SYSTOLIC BLOOD PRESSURE: 103 MMHG | BODY MASS INDEX: 24.84 KG/M2 | OXYGEN SATURATION: 98 % | DIASTOLIC BLOOD PRESSURE: 53 MMHG | RESPIRATION RATE: 18 BRPM | HEART RATE: 84 BPM | HEIGHT: 65 IN | WEIGHT: 149.1 LBS

## 2022-12-06 LAB
FASTING STATUS PATIENT QL REPORTED: NO
GLUCOSE BLD-MCNC: 95 MG/DL (ref 70–125)
HGB BLD-MCNC: 10.1 G/DL (ref 11.7–15.7)

## 2022-12-06 PROCEDURE — 97166 OT EVAL MOD COMPLEX 45 MIN: CPT | Mod: GO

## 2022-12-06 PROCEDURE — 82947 ASSAY GLUCOSE BLOOD QUANT: CPT | Performed by: ORTHOPAEDIC SURGERY

## 2022-12-06 PROCEDURE — 99214 OFFICE O/P EST MOD 30 MIN: CPT | Performed by: INTERNAL MEDICINE

## 2022-12-06 PROCEDURE — 97535 SELF CARE MNGMENT TRAINING: CPT | Mod: GO

## 2022-12-06 PROCEDURE — 36415 COLL VENOUS BLD VENIPUNCTURE: CPT | Performed by: ORTHOPAEDIC SURGERY

## 2022-12-06 PROCEDURE — 258N000003 HC RX IP 258 OP 636: Performed by: INTERNAL MEDICINE

## 2022-12-06 PROCEDURE — 250N000013 HC RX MED GY IP 250 OP 250 PS 637: Performed by: HOSPITALIST

## 2022-12-06 PROCEDURE — 97110 THERAPEUTIC EXERCISES: CPT | Mod: GP

## 2022-12-06 PROCEDURE — 85018 HEMOGLOBIN: CPT | Performed by: ORTHOPAEDIC SURGERY

## 2022-12-06 PROCEDURE — 97161 PT EVAL LOW COMPLEX 20 MIN: CPT | Mod: GP

## 2022-12-06 PROCEDURE — 250N000013 HC RX MED GY IP 250 OP 250 PS 637: Performed by: ORTHOPAEDIC SURGERY

## 2022-12-06 PROCEDURE — 250N000011 HC RX IP 250 OP 636: Performed by: ORTHOPAEDIC SURGERY

## 2022-12-06 PROCEDURE — 97116 GAIT TRAINING THERAPY: CPT | Mod: GP

## 2022-12-06 RX ORDER — OXYCODONE HYDROCHLORIDE 5 MG/1
5 TABLET ORAL EVERY 4 HOURS PRN
Qty: 33 TABLET | Refills: 0 | Status: ON HOLD | OUTPATIENT
Start: 2022-12-06 | End: 2022-12-11

## 2022-12-06 RX ORDER — LEVOTHYROXINE SODIUM 50 UG/1
50 TABLET ORAL DAILY
Status: DISCONTINUED | OUTPATIENT
Start: 2022-12-06 | End: 2022-12-06 | Stop reason: HOSPADM

## 2022-12-06 RX ADMIN — OXYCODONE HYDROCHLORIDE 5 MG: 5 TABLET ORAL at 12:21

## 2022-12-06 RX ADMIN — OXYCODONE HYDROCHLORIDE 5 MG: 5 TABLET ORAL at 00:21

## 2022-12-06 RX ADMIN — OXYCODONE HYDROCHLORIDE 5 MG: 5 TABLET ORAL at 08:18

## 2022-12-06 RX ADMIN — LEVOTHYROXINE SODIUM 50 MCG: 0.05 TABLET ORAL at 05:06

## 2022-12-06 RX ADMIN — POLYETHYLENE GLYCOL 3350 17 G: 17 POWDER, FOR SOLUTION ORAL at 08:18

## 2022-12-06 RX ADMIN — ACETAMINOPHEN 975 MG: 325 TABLET ORAL at 05:06

## 2022-12-06 RX ADMIN — SODIUM CHLORIDE 500 ML: 9 INJECTION, SOLUTION INTRAVENOUS at 09:15

## 2022-12-06 RX ADMIN — CEFAZOLIN 1 G: 1 INJECTION, POWDER, FOR SOLUTION INTRAMUSCULAR; INTRAVENOUS at 05:06

## 2022-12-06 RX ADMIN — FAMOTIDINE 20 MG: 20 TABLET ORAL at 08:18

## 2022-12-06 RX ADMIN — SENNOSIDES AND DOCUSATE SODIUM 1 TABLET: 50; 8.6 TABLET ORAL at 08:18

## 2022-12-06 ASSESSMENT — ACTIVITIES OF DAILY LIVING (ADL)
ADLS_ACUITY_SCORE: 23
ADLS_ACUITY_SCORE: 24
ADLS_ACUITY_SCORE: 23
ADLS_ACUITY_SCORE: 24

## 2022-12-06 NOTE — PROGRESS NOTES
12/06/22 1023   Appointment Info   Signing Clinician's Name / Credentials (OT) BROOKLYNN Mariee   Quick Adds   Quick Adds Certification   Living Environment   People in Home spouse   Current Living Arrangements house   Transportation Anticipated family or friend will provide   Living Environment Comments Pt has cane, tub shower and shower chair,   Self-Care   Usual Activity Tolerance good   Current Activity Tolerance good   Equipment Currently Used at Home none   Fall history within last six months no   Activity/Exercise/Self-Care Comment Pt IND w/ ADLs and IALs at [place]ne   General Information   Onset of Illness/Injury or Date of Surgery 12/05/22   Referring Physician Daren Coleman MD   Additional Occupational Profile Info/Pertinent History of Current Problem TKA   Existing Precautions/Restrictions no known precautions/restrictions   Left Lower Extremity (Weight-bearing Status) weight-bearing as tolerated (WBAT)   Right Lower Extremity (Weight-bearing Status) full weight-bearing (FWB)   Cognitive Status Examination   Orientation Status orientation to person, place and time   Visual Perception   Visual Impairment/Limitations WNL   Sensory   Sensory Quick Adds sensation intact   Pain Assessment   Patient Currently in Pain Yes, see Vital Sign flowsheet   Posture   Posture not impaired   Range of Motion Comprehensive   General Range of Motion no range of motion deficits identified   Strength Comprehensive (MMT)   General Manual Muscle Testing (MMT) Assessment no strength deficits identified   Muscle Tone Assessment   Muscle Tone Quick Adds No deficits were identified   Coordination   Upper Extremity Coordination No deficits were identified   Bed Mobility   Bed Mobility supine-sit;sit-supine   Comment (Bed Mobility) CGA for bed mobility   Transfers   Transfers bed-chair transfer;sit-stand transfer;toilet transfer;shower transfer   Transfer Comments SBA-CGA for transfers   Activities of Daily Living   BADL  Assessment/Intervention bathing;lower body dressing;toileting   Bathing Assessment/Intervention   Cisco Level (Bathing) contact guard assist   Lower Body Dressing Assessment/Training   Cisco Level (Lower Body Dressing) minimum assist (75% patient effort)   Toileting   Cisco Level (Toileting) supervision   Clinical Impression   Criteria for Skilled Therapeutic Interventions Met (OT) Yes, treatment indicated   OT Diagnosis decreased ADLs   Influenced by the following impairments TKA   OT Problem List-Impairments impacting ADL activity tolerance impaired;mobility;pain   Assessment of Occupational Performance 3-5 Performance Deficits   Identified Performance Deficits LE dressing, bed mobility, all transfers   Planned Therapy Interventions (OT) ADL retraining;bed mobility training;transfer training   Clinical Decision Making Complexity (OT) moderate complexity   Risk & Benefits of therapy have been explained evaluation/treatment results reviewed;patient;spouse/significant other   OT Total Evaluation Time   OT Eval, Moderate Complexity Minutes (48307) 10   Therapy Certification   Medical Diagnosis TKA   Start of Care Date 12/06/22   Certification date from 12/06/22   Certification date to 01/05/23   OT Goals   Therapy Frequency (OT) One time eval and treatment   OT Predicted Duration/Target Date for Goal Attainment 12/06/22   OT Goals Lower Body Dressing;Bed Mobility;Transfers   OT: Lower Body Dressing Modified independent;Goal Met;Completed   OT: Bed Mobility Modified independent;supine to/from sitting;rolling;bridging;Goal Met;Completed   OT: Transfer Supervision/stand-by assist;with assistive device;Goal Met;Completed  (tub shower)   Interventions   Interventions Quick Adds Self-Care/Home Management   Self-Care/Home Management   Self-Care/Home Mgmt/ADL, Compensatory, Meal Prep Minutes (19665) 20   Symptoms Noted During/After Treatment (Meal Preparation/Planning Training) none   Treatment  Detail/Skilled Intervention Pt edu on compensatory strategies for LE dressing - pt completed Mod I. STS w/ SBA and amb. ~50 ftx2 to BR w/ FWW, minor pain w/ mobility and no symptoms of dizziness or lightheadedness. Edu on walker safety and moving slowly to prevent falls. Pt edu on safety technique for toilet/tub shower transfer w/ shower chair- completed each Mod I. Pt instructed on bed mobility techniques - completed Mod I. Pt edu on sleeping position and car transfers - pt verbalized understanding. Pt getting setup w/ reacher for home   OT Discharge Planning   OT Plan DC OT   OT Discharge Recommendation (DC Rec) (S)  home with assist   OT Rationale for DC Rec Pt tolerating therapy well after experiencing dizziness and low vitals earlier w/ PT. Pt has good setup at home and is getting a reacher for DC. Pt's  works from home and can assist as needed w/ ADLs and IADLs.   OT Brief overview of current status Mod I for ADLs.   Total Session Time   Timed Code Treatment Minutes 20   Total Session Time (sum of timed and untimed services) 30      [facility]  OUTPATIENT OCCUPATIONAL THERAPY  EVALUATION  PLAN OF TREATMENT FOR OUTPATIENT REHABILITATION  (COMPLETE FOR INITIAL CLAIMS ONLY)  Patient's Last Name, First Name, M.I.  YOB: 1955  Eleazar Cortez                          Provider's Name  [facility] Medical Record No.  [id number]                             Onset Date:  12/05/22   Start of Care Date:  12/06/22   Type:     ___PT   _X_OT   ___SLP Medical Diagnosis:  TKA                    OT Diagnosis:  decreased ADLs Visits from SOC:  1     See note for plan of treatment, functional goals and certification details    I CERTIFY THE NEED FOR THESE SERVICES FURNISHED UNDER        THIS PLAN OF TREATMENT AND WHILE UNDER MY CARE     (Physician co-signature of this document indicates review and certification of the therapy plan).               Daren Coleman MD

## 2022-12-06 NOTE — PLAN OF CARE

## 2022-12-06 NOTE — PROGRESS NOTES
Discharge:  Pt is A/Ox4, able to ambulate without difficulty. Incision dressing has small amount of old drainage, dark dry red. Drain dressing clean and intact, new dressing applied before discharged. Pt is medically clear to discharge home with .   Discharged at 1340 hours via wheelchair.

## 2022-12-06 NOTE — PROGRESS NOTES
Physical Therapy Discharge Summary    Reason for therapy discharge:    All goals and outcomes met, no further needs identified.    Progress towards therapy goal(s). See goals on Care Plan in [facility] electronic health record for goal details.  Goals met    Therapy recommendation(s):    Continued therapy is recommended.  Rationale/Recommendations:  OP PT.  Continue home exercise program.

## 2022-12-06 NOTE — PROGRESS NOTES
"[facility] Medicine PROGRESS NOTE      Identification/Summary:      Eleazar Cortez is a 67 year old female with a past medical history of hypothyroidism, osteoarthritis who was admitted on 12/5/2022 for left TKA.     Assessment & Plan      Hypotension/bradycardia, vasovagal episode  BP, heart rate improved after laying down in the bed, received 500 cc fluid bolus, doing well, okay for discharge    Acute blood loss anemia  Preop hemoglobin 12.1, currently 10.1    Status post left TKA management per orthopedics  Clinically Significant Risk Factors Present on Admission                                 Diet: Advance Diet as Tolerated: Regular Diet Adult  Discharge Instruction - Regular Diet Adult  DVT Prophylaxis:   [place] Catheter: Not present  Central Lines: None    Code Status: Full Code    Discharge Planning   Anticipated Discharge in :  Expected Discharge Destination:   Milestones/Criteria For Discharge:    Disposition Plan      Expected Discharge Date: 12/06/2022, 12:00 PM                The patient's care was discussed with the Bedside Nurse and Patient.      Interval History/Subjective:     While working with the PT patient became lightheaded, sweaty, nauseated.  Noted to be hypotensive and bradycardic.  She was laid in the bed.  She had improvement in her symptoms, currently feels well, no dizziness or lightheadedness.  No chest pain or shortness of breath.  No prior history of syncopal episodes or lightheadedness episodes.    Physical Exam/Objective:     Vitals I/O   Vital signs:  Temp: 98.2  F (36.8  C) Temp src: Oral BP: 103/53 Pulse: 84   Resp: 18 SpO2: 98 % O2 Device: None (Room air) Oxygen Delivery: 6 LPM Height: 165.1 cm (5' 5\") Weight: 67.6 kg (149 lb 1.6 oz)  Estimated body mass index is 24.81 kg/m  as calculated from the following:    Height as of this encounter: 1.651 m (5' 5\").    Weight as of this encounter: 67.6 kg (149 lb 1.6 oz).       I/O last 3 completed shifts:  In: 1410 [I.V.:1410]  Out: " 2230 [Urine:2150; Drains:80]     Vitals:    12/05/22 1019   Weight: 67.6 kg (149 lb 1.6 oz)      Weight change:    Body mass index is 24.81 kg/m .    General: Awake alert and comfortable  Lungs: CTA without rales or rhonchi  Heart: S1, S2 without murmurs  Abdomen: Soft nontender, bowel sounds positive  CNS: Nonfocal  Extremities: No edema      Medications:     Medications     lactated ringers Stopped (12/05/22 2025)       acetaminophen  975 mg Oral Q8H     aspirin  325 mg Oral Daily     famotidine  20 mg Oral BID    Or     famotidine  20 mg Intravenous BID     levothyroxine  50 mcg Oral Daily     polyethylene glycol  17 g Oral Daily     senna-docusate  1 tablet Oral BID     sodium chloride (PF)  3 mL Intracatheter Q8H       Data Reviewed   I personally reviewed all new medications, labs, imaging/diagnostics reports over the past 24 hours.     Pertinent findings include.     Labs    Recent Labs   Lab 12/06/22  0826 12/05/22  1017   WBC  --  5.3   HGB 10.1* 12.1   MCV  --  93   PLT  --  312   POTASSIUM  --  4.3   CR  --  0.84   GLC 95  --        Pending Labs  Unresulted Labs Ordered in the Past 30 Days of this Admission     No orders found for last 31 day(s).          Imaging   No results found for this or any previous visit (from the past 24 hour(s)).      EKG:      Maria Luisa Maldonado MD  Internal Medicine / [facility] medicine   [facility]  Securely message with the Vocera Web Console (learn more here)  Text page via [employer] (un[employer] technologies)

## 2022-12-06 NOTE — PROGRESS NOTES
12/06/22 3409   Appointment Info   Signing Clinician's Name / Credentials (PT) alfred Hernandez, PT, DPT   Quick Adds   Quick Adds Certification   Living Environment   People in Home spouse   Current Living Arrangements house   Home Accessibility stairs to enter home;stairs within home   Number of Stairs, Main Entrance 3   Stair Railings, Main Entrance none   Number of Stairs, Within Home, Primary greater than 10 stairs   Stair Railings, Within Home, Primary railing on right side (ascending)   Self-Care   Equipment Currently Used at Home none   Fall history within last six months no   Activity/Exercise/Self-Care Comment has cane   General Information   Onset of Illness/Injury or Date of Surgery 12/05/22   Referring Physician Dr. Daren Coleman   Patient/Family Therapy Goals Statement (PT) Walk without pain   Pertinent History of Current Problem (include personal factors and/or comorbidities that impact the POC) S/P L TKA   Weight-Bearing Status - LLE weight-bearing as tolerated   Weight-Bearing Status - RLE full weight-bearing   Transfers   Transfers sit-stand transfer   Maintains Weight-bearing Status (Transfers) able to maintain   Sit-Stand Transfer   Sit-Stand [place] (Transfers) supervision;verbal cues   Assistive Device (Sit-Stand Transfers) walker, front-wheeled   Gait/Stairs (Locomotion)   [place] Level (Gait) supervision;verbal cues   Assistive Device (Gait) walker, front-wheeled   Distance in Feet 10   Distance in Feet (Gait) 200x2   Pattern (Gait) step-to   Deviations/Abnormal Patterns (Gait) jose raul decreased;festinating/shuffling   Maintains Weight-bearing Status (Gait) able to maintain   Clinical Impression   Criteria for Skilled Therapeutic Intervention Yes, treatment indicated   PT Diagnosis (PT) Impaired functional mobility   Influenced by the following impairments weakness, pain   Functional limitations due to impairments transfers, gait, stairs   Clinical Presentation (PT Evaluation  Complexity) Stable/Uncomplicated   Clinical Presentation Rationale Pt presents as medically diagnosed   Clinical Decision Making (Complexity) low complexity   Planned Therapy Interventions (PT) gait training;home exercise program;patient/family education;transfer training;stair training   Anticipated Equipment Needs at Discharge (PT) walker, rolling;cane, straight   Risk & Benefits of therapy have been explained evaluation/treatment results reviewed;care plan/treatment goals reviewed;patient   PT Total Evaluation Time   PT Eval, Low Complexity Minutes (89959) 10   Therapy Certification   Start of care date 12/06/22   Certification date from 12/06/22   Certification date to 12/27/22   Medical Diagnosis S/P L TKA   Physical Therapy Goals   PT Frequency 2x/day   PT Predicted Duration/Target Date for Goal Attainment 12/07/22   PT Goals Gait;Stairs;PT Goal 1   PT: Gait Modified independent;Rolling walker;150 feet;Goal Met   PT: Stairs Supervision/stand-by assist;3 stairs;Goal Met   PT: Goal 1 independent with TKA HEP   Interventions   Interventions Quick Adds Gait Training;Therapeutic Activity   Therapeutic Activity   Symptoms Noted During/After Treatment None   Treatment Detail/Skilled Intervention Sit<>stand with FWW, mod I   Gait Training   Gait Training Minutes (70084) 15   Symptoms Noted During/After Treatment (Gait Training) dizziness   Treatment Detail/Skilled Intervention verbal cueing for safety and LE advancement. Stairs: 4 stairs with right rail and cane, SBA, verbal cueing for stair patterning. 4 stairs with no rails and cane, SBA. patient became dizzy entering the room after ambulation and stairs, vitals assessed and RN called, HR 37bpm, Sp02 100%.   [place] Level (Gait Training) independent   Physical Assistance Level (Gait Training) verbal cues   Weight Bearing (Gait Training) weight-bearing as tolerated   Assistive Device (Gait Training) rolling walker   Pattern Analysis (Gait Training) swing-through  gait   Gait Analysis Deviations decreased jose raul;decreased step length   Impairments (Gait Analysis/Training) strength decreased;pain   PT Discharge Planning   PT Plan TK exercises   PT Discharge Recommendation (DC Rec) (S)  home with outpatient physical therapy   PT Rationale for DC Rec Ambulating and negotiating stairs safely, support from spouse at home   PT Brief overview of current status Amb 200ft with FWW, mod I   Total Session Time   Timed Code Treatment Minutes 15   Total Session Time (sum of timed and untimed services) 25   M [facility]  OUTPATIENT PHYSICAL THERAPY EVALUATION  PLAN OF TREATMENT FOR OUTPATIENT REHABILITATION  (COMPLETE FOR INITIAL CLAIMS ONLY)  Patient's Last Name, First Name, M.I.  YOB: 1955  DanaEleazar  M                        Provider's Name  [facility] Medical Record No.  [id number]                             Onset Date:  12/05/22   Start of Care Date:  12/06/22   Type:     _X_PT   ___OT   ___SLP Medical Diagnosis:  S/P L TKA              PT Diagnosis:  Impaired functional mobility Visits from SOC:  1     See note for plan of treatment, functional goals and certification details    I CERTIFY THE NEED FOR THESE SERVICES FURNISHED UNDER        THIS PLAN OF TREATMENT AND WHILE UNDER MY CARE     (Physician co-signature of this document indicates review and certification of the therapy plan).            Daren Coleman MD

## 2022-12-06 NOTE — PLAN OF CARE
Occupational Therapy Discharge Summary    Reason for therapy discharge:    All goals and outcomes met, no further needs identified.    Progress towards therapy goal(s). See goals on Care Plan in [facility] electronic health record for goal details.  Goals met    Therapy recommendation(s):    No further therapy is recommended.

## 2022-12-06 NOTE — PLAN OF CARE
Patient vital signs are at baseline: Yes  Patient able to ambulate as they were prior to admission or with assist devices provided by therapies during their stay:  Yes  Patient MUST void prior to discharge:  Yes  Patient able to tolerate oral intake:  Yes  Pain has adequate pain control using Oral analgesics:  Yes  Does patient have an identified :  Yes  Has goal D/C date and time been discussed with patient:  Yes  Plan is to discharge home with spouse in AM following therapies.

## 2022-12-06 NOTE — SIGNIFICANT EVENT
After walking with PT pt became pale, diaphoretic and dizzy. BP 54/31 and HR 39. Pt remained alert and talking. Reclined in chair,  notified, fluid bolus started. BP now 104/54 and HR 83.     June Dodd RN

## 2022-12-06 NOTE — PROGRESS NOTES
Care Management Initial Consult    General Information  Assessment completed with: VM-chart review,    Type of CM/SW Visit: Chart Assessment    Additional Information:  SW reviewed chart.  Pt lives in a home with spouse, who is Pt's legal guardian.  Anticipate pt will return to prior living environment with support from spouse.  Spouse to transport.     CLARIBEL Raygoza

## 2022-12-06 NOTE — PROGRESS NOTES
"[facility] Orthopaedics Progress Note      Post-operative Day: 1 Day Post-Op    Procedure(s):  LEFT TOTAL KNEE ARTHROPLASTY        Plan: Anticoagulation protocol:  mg daily  x 42  days            Pain medications:  scopainmedication: oxycodone and tylenol            Weight bearing status:  WBAT  Acute Blood Loss Anemia: non symptomatic, no further treatment or monitoring necessary at this time.             Disposition:  Home today after therapies             Continue cares and rehabilitation     Subjective:  Eleazar is in the bathroom, walks using a walker around the room without limping and is seated comfortably and smiling during our visit.   Pain: minimal and moderate  Chest pain, SOB:  No  Denies lightheadedness/dizziness  Denies nausea/ vomiting  Passing flatus  voiding well  Drain removed this morning.     Objective:  Blood pressure 109/56, pulse 87, temperature 98.2  F (36.8  C), temperature source Oral, resp. rate 18, height 1.651 m (5' 5\"), weight 67.6 kg (149 lb 1.6 oz), SpO2 95 %, not currently breastfeeding.    Patient Vitals for the past 24 hrs:   BP Temp Temp src Pulse Resp SpO2 Height Weight   12/06/22 0805 109/56 98.2  F (36.8  C) Oral 87 18 95 % -- --   12/06/22 0215 100/51 99.2  F (37.3  C) Oral 85 14 91 % -- --   12/05/22 2223 117/68 98.5  F (36.9  C) Oral 82 17 94 % -- --   12/05/22 1823 124/60 97.9  F (36.6  C) Oral 89 16 97 % -- --   12/05/22 1723 123/61 98.3  F (36.8  C) Oral 87 16 97 % -- --   12/05/22 1623 125/66 97.2  F (36.2  C) Oral 67 16 99 % -- --   12/05/22 1553 116/58 98.1  F (36.7  C) Oral 67 16 99 % -- --   12/05/22 1523 112/62 97.6  F (36.4  C) Oral 72 18 97 % -- --   12/05/22 1440 115/64 -- -- -- 22 96 % -- --   12/05/22 1410 107/60 -- -- 78 17 96 % -- --   12/05/22 1400 112/59 -- -- 76 17 98 % -- --   12/05/22 1350 107/58 -- -- 77 18 100 % -- --   12/05/22 1340 101/56 -- -- 79 19 100 % -- --   12/05/22 1330 114/55 -- -- 82 22 100 % -- --   12/05/22 1320 100/53 -- -- -- 19 99 " "% -- --   12/05/22 1140 -- -- -- 75 21 -- -- --   12/05/22 1130 99/55 -- -- 76 17 99 % -- --   12/05/22 1125 96/54 -- -- 68 27 99 % -- --   12/05/22 1120 117/60 -- -- 78 28 100 % -- --   12/05/22 1019 123/62 98.4  F (36.9  C) Temporal 80 16 96 % 1.651 m (5' 5\") 67.6 kg (149 lb 1.6 oz)       Wt Readings from Last 4 Encounters:   12/05/22 67.6 kg (149 lb 1.6 oz)   11/28/22 68 kg (150 lb)   07/14/22 66.7 kg (147 lb)   02/10/21 63.6 kg (140 lb 3.2 oz)         Motor function, sensation, and circulation intact   Yes  Wound status: Aquacel has moderate shadowing to the distal portion of the dressing, but dressing remains occlusive, dry, and intact. Yes  Calf tenderness: Bilateral  No    Pertinent Labs   Lab Results: personally reviewed.     Recent Labs   Lab Test 12/06/22  0826 12/05/22  1017 07/14/22  1051 02/10/21  1157 01/27/20  1409   HGB 10.1* 12.1 12.6 12.9  --    HCT  --  36.8 38.6 39.9  --    MCV  --  93 95 95  --    PLT  --  312 291 280  --    NA  --   --  140 138 140       Report completed by:  Arcenio Hernandez NP  Date: 12/6/2022       "

## 2022-12-06 NOTE — DISCHARGE SUMMARY
[facility] Orthopedics Discharge Summary                                  [facility]     BRIGHT CALDERON [id number]   Age: 67 year old  PCP: Cheryl Serrano, [phone] 1955     Date of Admission:  12/5/2022  Date of Discharge::  12/6/2022  Discharge Provider:  Arcenio Hernandez NP    Code status:  Full Code    Admission Information:  Admission Diagnosis:  Osteoarthritis [M19.90]  Left knee DJD [M17.12]    Post-Operative Day: 1 Day Post-Op     Reason for admission:  The patient was admitted for the following:Procedure(s) (LRB):  LEFT TOTAL KNEE ARTHROPLASTY (Left)    Active Problems:    * No active hospital problems. *      Allergies:  Codeine and Dye [contrast dye]    Following the procedure noted above the patient was transferred to the post-op floor and started on:    Therapy:  physical therapy and occupational therapy  Anticoagulation Plan:  mg daily  for 42 days  Pain Management: scopainmedication: oxycodone and tylenol  Weight bearing status: Weight bearing as tolerated     The patient was followed by Orthopedics during the inpatient treatment course:  Complications:  Acute Blood Loss Anemia: non symptomatic, no further treatment or monitoring necessary at this time.   Patient had one episode of vasovagal response during therapy, but her BP normalized s/p fluid bolus. Is safe to discharge per medicine.   Additional consultations:  [facility] [place], expertise and consultation appreciated.      Pertinent Labs   Lab Results: personally reviewed.     Recent Labs   Lab Test 12/06/22  0826 12/05/22  1017 07/14/22  1051 02/10/21  1157 01/27/20  1409   HGB 10.1* 12.1 12.6 12.9  --    HCT  --  36.8 38.6 39.9  --    MCV  --  93 95 95  --    PLT  --  312 291 280  --    NA  --   --  140 138 140          Discharge Information:  Condition at discharge: Stable  Discharge destination:  Discharged to home     Medications at discharge:  Discharge Medication List as of 12/6/2022  1:14 PM      START taking these  medications    Details   aspirin (ASA) 325 MG EC tablet Take 1 tablet (325 mg) by mouth daily, Disp-42 tablet, R-0, No Print Out      oxyCODONE (ROXICODONE) 5 MG tablet Take 1 tablet (5 mg) by mouth every 4 hours as needed for moderate pain (4-6) Take 1 pill for moderate pain (4-6/10) and 2 pills for severe pain (7-10/10). Maximum 6 pills per day. Alternate with Tylenol., Disp-33 tablet, R-0, E-Prescribe      senna-docusate (SENOKOT-S/PERICOLACE) 8.6-50 MG tablet Take 1-2 tablets by mouth 2 times daily Take while on oral narcotics to prevent or treat constipation., Disp-30 tablet, R-0, No Print OutWhile taking narcotics         CONTINUE these medications which have NOT CHANGED    Details   acetaminophen (TYLENOL) 500 MG tablet Take 500-1,000 mg by mouth every 6 hours as needed for mild pain, Historical      Calcium Citrate-Vitamin D (CALCIUM + D PO) Take 1 tablet by mouth daily, Historical      IBANdronate (BONIVA) 150 MG tablet Take 1 tablet (150 mg) by mouth every 30 days, Disp-3 tablet, R-3, E-Prescribe      ibuprofen (ADVIL/MOTRIN) 200 MG tablet Take 600 mg by mouth every 8 hours as needed for pain, Historical      levothyroxine (SYNTHROID/LEVOTHROID) 50 MCG tablet Take 1 tablet (50 mcg) by mouth daily, Disp-90 tablet, R-3, E-Prescribe      UNABLE TO FIND MEDICATION NAME: Melaleuca PM - Longevity  2 CardiOmegaEPA (Omega 3 Fishoils - 1000 mg EPG and 100 mg DHA)  2 Vitality Multivitamin and Mineral (Iron, Vitamin K, Folic acid, Biotin, Calcium)  2 CellWise Antioxidant (Olive extract, grape seed extract, Vit amin C, carotenoids)  2 ProveXCV (Blood pressure support)  2 RecoverAl (Inflammation)  1 Florify (acid resistance, probiotic)  -1 Vitality [place] (omega 3, Brain eye and heart)  -2 Acuity [place] (Brain health)  -1 K2-03 (Redirects calcium bone health)  -1 Replenex Extra strength   -1 Nutraview (Vision- macular/lens health)  1 CoQ10 +Cellular Energy Support (antioxidants), Historical           Follow-Up Care:   Patient should be seen in the office in 10-14 days by the Orthopedic Surgeon/Physician Assistant.  Call [phone] for appointment or questions.    It was my pleasure to take care of the above patient.  Arcenio Hernandez, NP

## 2022-12-08 ENCOUNTER — DOCUMENTATION ONLY (OUTPATIENT)
Dept: OTHER | Facility: CLINIC | Age: 67
End: 2022-12-08

## 2022-12-09 ENCOUNTER — APPOINTMENT (OUTPATIENT)
Dept: GENERAL RADIOLOGY | Facility: CLINIC | Age: 67
DRG: 390 | End: 2022-12-09
Attending: EMERGENCY MEDICINE
Payer: COMMERCIAL

## 2022-12-09 ENCOUNTER — APPOINTMENT (OUTPATIENT)
Dept: CT IMAGING | Facility: CLINIC | Age: 67
DRG: 390 | End: 2022-12-09
Attending: EMERGENCY MEDICINE
Payer: COMMERCIAL

## 2022-12-09 ENCOUNTER — HOSPITAL ENCOUNTER (INPATIENT)
Facility: CLINIC | Age: 67
LOS: 3 days | Discharge: HOME | DRG: 390 | End: 2022-12-12
Attending: EMERGENCY MEDICINE | Admitting: INTERNAL MEDICINE
Payer: COMMERCIAL

## 2022-12-09 DIAGNOSIS — K56.609 SBO (SMALL BOWEL OBSTRUCTION) (H): ICD-10-CM

## 2022-12-09 DIAGNOSIS — R11.2 NAUSEA AND VOMITING, UNSPECIFIED VOMITING TYPE: ICD-10-CM

## 2022-12-09 LAB
ALBUMIN SERPL BCG-MCNC: 4 G/DL (ref 3.5–5.2)
ALP SERPL-CCNC: 52 U/L (ref 35–104)
ALT SERPL W P-5'-P-CCNC: 12 U/L (ref 10–35)
ANION GAP SERPL CALCULATED.3IONS-SCNC: 10 MMOL/L (ref 7–15)
AST SERPL W P-5'-P-CCNC: 28 U/L (ref 10–35)
BASOPHILS # BLD AUTO: 0 10E3/UL (ref 0–0.2)
BASOPHILS NFR BLD AUTO: 1 %
BILIRUB SERPL-MCNC: 0.7 MG/DL
BUN SERPL-MCNC: 16.3 MG/DL (ref 8–23)
CALCIUM SERPL-MCNC: 9.2 MG/DL (ref 8.8–10.2)
CHLORIDE SERPL-SCNC: 95 MMOL/L (ref 98–107)
CREAT SERPL-MCNC: 0.86 MG/DL (ref 0.51–0.95)
DEPRECATED HCO3 PLAS-SCNC: 32 MMOL/L (ref 22–29)
EOSINOPHIL # BLD AUTO: 0 10E3/UL (ref 0–0.7)
EOSINOPHIL NFR BLD AUTO: 1 %
ERYTHROCYTE [DISTWIDTH] IN BLOOD BY AUTOMATED COUNT: 13.3 % (ref 10–15)
GFR SERPL CREATININE-BSD FRML MDRD: 74 ML/MIN/1.73M2
GLUCOSE SERPL-MCNC: 125 MG/DL (ref 70–99)
HCT VFR BLD AUTO: 33.8 % (ref 35–47)
HGB BLD-MCNC: 11.1 G/DL (ref 11.7–15.7)
HOLD SPECIMEN: NORMAL
HOLD SPECIMEN: NORMAL
IMM GRANULOCYTES # BLD: 0 10E3/UL
IMM GRANULOCYTES NFR BLD: 0 %
LIPASE SERPL-CCNC: 17 U/L (ref 13–60)
LYMPHOCYTES # BLD AUTO: 0.7 10E3/UL (ref 0.8–5.3)
LYMPHOCYTES NFR BLD AUTO: 11 %
MAGNESIUM SERPL-MCNC: 2.4 MG/DL (ref 1.7–2.3)
MCH RBC QN AUTO: 30.6 PG (ref 26.5–33)
MCHC RBC AUTO-ENTMCNC: 32.8 G/DL (ref 31.5–36.5)
MCV RBC AUTO: 93 FL (ref 78–100)
MONOCYTES # BLD AUTO: 0.7 10E3/UL (ref 0–1.3)
MONOCYTES NFR BLD AUTO: 12 %
NEUTROPHILS # BLD AUTO: 4.5 10E3/UL (ref 1.6–8.3)
NEUTROPHILS NFR BLD AUTO: 75 %
NRBC # BLD AUTO: 0 10E3/UL
NRBC BLD AUTO-RTO: 0 /100
PHOSPHATE SERPL-MCNC: 4.2 MG/DL (ref 2.5–4.5)
PLATELET # BLD AUTO: 338 10E3/UL (ref 150–450)
POTASSIUM SERPL-SCNC: 3.9 MMOL/L (ref 3.4–5.3)
PROT SERPL-MCNC: 7.6 G/DL (ref 6.4–8.3)
RBC # BLD AUTO: 3.63 10E6/UL (ref 3.8–5.2)
SODIUM SERPL-SCNC: 137 MMOL/L (ref 136–145)
WBC # BLD AUTO: 6 10E3/UL (ref 4–11)

## 2022-12-09 PROCEDURE — 258N000003 HC RX IP 258 OP 636: Performed by: EMERGENCY MEDICINE

## 2022-12-09 PROCEDURE — 99221 1ST HOSP IP/OBS SF/LOW 40: CPT | Performed by: SURGERY

## 2022-12-09 PROCEDURE — 84100 ASSAY OF PHOSPHORUS: CPT | Performed by: SURGERY

## 2022-12-09 PROCEDURE — 96361 HYDRATE IV INFUSION ADD-ON: CPT | Performed by: EMERGENCY MEDICINE

## 2022-12-09 PROCEDURE — 80053 COMPREHEN METABOLIC PANEL: CPT | Performed by: EMERGENCY MEDICINE

## 2022-12-09 PROCEDURE — 83690 ASSAY OF LIPASE: CPT | Performed by: EMERGENCY MEDICINE

## 2022-12-09 PROCEDURE — 99285 EMERGENCY DEPT VISIT HI MDM: CPT | Performed by: EMERGENCY MEDICINE

## 2022-12-09 PROCEDURE — 120N000001 HC R&B MED SURG/OB

## 2022-12-09 PROCEDURE — 250N000013 HC RX MED GY IP 250 OP 250 PS 637: Performed by: EMERGENCY MEDICINE

## 2022-12-09 PROCEDURE — 85025 COMPLETE CBC W/AUTO DIFF WBC: CPT | Performed by: EMERGENCY MEDICINE

## 2022-12-09 PROCEDURE — 99285 EMERGENCY DEPT VISIT HI MDM: CPT | Mod: 25 | Performed by: EMERGENCY MEDICINE

## 2022-12-09 PROCEDURE — 96374 THER/PROPH/DIAG INJ IV PUSH: CPT | Performed by: EMERGENCY MEDICINE

## 2022-12-09 PROCEDURE — 250N000013 HC RX MED GY IP 250 OP 250 PS 637: Performed by: NURSE PRACTITIONER

## 2022-12-09 PROCEDURE — 250N000011 HC RX IP 250 OP 636: Performed by: EMERGENCY MEDICINE

## 2022-12-09 PROCEDURE — 74176 CT ABD & PELVIS W/O CONTRAST: CPT

## 2022-12-09 PROCEDURE — 83735 ASSAY OF MAGNESIUM: CPT | Performed by: SURGERY

## 2022-12-09 PROCEDURE — 36415 COLL VENOUS BLD VENIPUNCTURE: CPT | Performed by: EMERGENCY MEDICINE

## 2022-12-09 PROCEDURE — 999N000065 XR ABDOMEN PORT 1 VIEW

## 2022-12-09 RX ORDER — HYDROMORPHONE HCL IN WATER/PF 6 MG/30 ML
0.2 PATIENT CONTROLLED ANALGESIA SYRINGE INTRAVENOUS
Status: DISCONTINUED | OUTPATIENT
Start: 2022-12-09 | End: 2022-12-12 | Stop reason: HOSPADM

## 2022-12-09 RX ORDER — HYDROMORPHONE HYDROCHLORIDE 1 MG/ML
0.5 INJECTION, SOLUTION INTRAMUSCULAR; INTRAVENOUS; SUBCUTANEOUS
Status: DISCONTINUED | OUTPATIENT
Start: 2022-12-09 | End: 2022-12-12 | Stop reason: HOSPADM

## 2022-12-09 RX ORDER — METHOCARBAMOL 500 MG/1
500 TABLET, FILM COATED ORAL 4 TIMES DAILY PRN
Status: ON HOLD | COMMUNITY
Start: 2022-12-06 | End: 2023-02-01

## 2022-12-09 RX ORDER — NALOXONE HYDROCHLORIDE 0.4 MG/ML
0.2 INJECTION, SOLUTION INTRAMUSCULAR; INTRAVENOUS; SUBCUTANEOUS
Status: DISCONTINUED | OUTPATIENT
Start: 2022-12-09 | End: 2022-12-12 | Stop reason: HOSPADM

## 2022-12-09 RX ORDER — ONDANSETRON 2 MG/ML
4 INJECTION INTRAMUSCULAR; INTRAVENOUS EVERY 30 MIN PRN
Status: DISCONTINUED | OUTPATIENT
Start: 2022-12-09 | End: 2022-12-09

## 2022-12-09 RX ORDER — LEVOTHYROXINE SODIUM 50 UG/1
50 TABLET ORAL DAILY
Status: DISCONTINUED | OUTPATIENT
Start: 2022-12-09 | End: 2022-12-12 | Stop reason: HOSPADM

## 2022-12-09 RX ORDER — ONDANSETRON 2 MG/ML
4 INJECTION INTRAMUSCULAR; INTRAVENOUS EVERY 6 HOURS PRN
Status: DISCONTINUED | OUTPATIENT
Start: 2022-12-09 | End: 2022-12-12 | Stop reason: HOSPADM

## 2022-12-09 RX ORDER — NALOXONE HYDROCHLORIDE 0.4 MG/ML
0.4 INJECTION, SOLUTION INTRAMUSCULAR; INTRAVENOUS; SUBCUTANEOUS
Status: DISCONTINUED | OUTPATIENT
Start: 2022-12-09 | End: 2022-12-12 | Stop reason: HOSPADM

## 2022-12-09 RX ORDER — SODIUM CHLORIDE, SODIUM LACTATE, POTASSIUM CHLORIDE, CALCIUM CHLORIDE 600; 310; 30; 20 MG/100ML; MG/100ML; MG/100ML; MG/100ML
INJECTION, SOLUTION INTRAVENOUS CONTINUOUS
Status: DISCONTINUED | OUTPATIENT
Start: 2022-12-09 | End: 2022-12-09

## 2022-12-09 RX ORDER — SODIUM CHLORIDE 9 MG/ML
INJECTION, SOLUTION INTRAVENOUS CONTINUOUS
Status: DISCONTINUED | OUTPATIENT
Start: 2022-12-09 | End: 2022-12-10

## 2022-12-09 RX ADMIN — ASPIRIN 325 MG: 325 TABLET, COATED ORAL at 21:39

## 2022-12-09 RX ADMIN — SODIUM CHLORIDE 1000 ML: 9 INJECTION, SOLUTION INTRAVENOUS at 13:08

## 2022-12-09 RX ADMIN — Medication 1 LOZENGE: at 16:42

## 2022-12-09 RX ADMIN — Medication 1 LOZENGE: at 18:17

## 2022-12-09 RX ADMIN — LEVOTHYROXINE SODIUM 50 MCG: 0.05 TABLET ORAL at 19:52

## 2022-12-09 RX ADMIN — ONDANSETRON 4 MG: 2 INJECTION INTRAMUSCULAR; INTRAVENOUS at 13:09

## 2022-12-09 RX ADMIN — ONDANSETRON 4 MG: 2 INJECTION INTRAMUSCULAR; INTRAVENOUS at 14:52

## 2022-12-09 RX ADMIN — SODIUM CHLORIDE: 9 INJECTION, SOLUTION INTRAVENOUS at 21:39

## 2022-12-09 RX ADMIN — SODIUM CHLORIDE: 9 INJECTION, SOLUTION INTRAVENOUS at 14:51

## 2022-12-09 RX ADMIN — Medication 1 LOZENGE: at 20:50

## 2022-12-09 ASSESSMENT — ENCOUNTER SYMPTOMS
SHORTNESS OF BREATH: 0
NAUSEA: 1
VOMITING: 1
ABDOMINAL PAIN: 1
FEVER: 0

## 2022-12-09 ASSESSMENT — ACTIVITIES OF DAILY LIVING (ADL)
ADLS_ACUITY_SCORE: 35
ADLS_ACUITY_SCORE: 33
ADLS_ACUITY_SCORE: 35
FALL_HISTORY_WITHIN_LAST_SIX_MONTHS: NO
CHANGE_IN_FUNCTIONAL_STATUS_SINCE_ONSET_OF_CURRENT_ILLNESS/INJURY: YES
ADLS_ACUITY_SCORE: 20

## 2022-12-09 NOTE — CONSULTS
Surgical Consultation/History and Physical  [facility] General Surgery    Eleazar is seen in consultation for Small bowel obstruction    Chief Complaint:  Abdominal pain    History of Present Illness: Eleazar Cortez is a 67 year old female presents with Abdominal pain.  Patient had knee replacement 4 days prior to evaluation in the ED.  She admits nausea and vague discomfort starting 2 days prior.  She was unable to keep any PO down yesterday.  She currently admits dull abdominal ache and sore throat following placement of NG.  She has never had an obstruction nor previous abdominal surgery.  Her last bowel movement was yesterday and small.  She did take an enema with minimal output yesterday.  She has been taking pain medications.  She has been ambulating following her TKA.      She has a history of non-hodgkins lymphoma in remission    Patient Active Problem List   Diagnosis     Disorder of bone and cartilage     NONHODGKIN'S LYMPHOMA IN REMISSION     Benign neoplasm of colon     Atrophic vaginitis     Basal Cell Carcinoma of right lower medial Eyelid     Seborrheic Keratosis right leg     CARDIOVASCULAR SCREENING; LDL GOAL LESS THAN 160     Colon polyp, hyperplastic     Environmental allergies     Hypothyroidism     Tear of lateral cartilage or meniscus of knee, current     Knee pain     Other postprocedural status(V45.89)     Shoulder pain, right     Superficial basal cell carcinoma     Lymphoma in remission (H)     Osteoporosis without current pathological fracture, unspecified osteoporosis type     SBO (small bowel obstruction) (H)     Nausea and vomiting, unspecified vomiting type     Past Medical History:   Diagnosis Date     Arthritis 2014    in knee     BCC (basal cell carcinoma), face 2009    right lateral nose.  removed via Mohs     Hypothyroidism 10/11/2012     Osteoporosis      Other malignant lymphomas, unspecified site, extranodal and solid organ sites 2002    Non-Hodgkin's Lymphoma     Scoliosis (and  "kyphoscoliosis), idiopathic      Uncomplicated asthma childhood    Rarely... haven't noticed for a long time     Past Surgical History:   Procedure Laterality Date     ARTHROPLASTY KNEE Left 2022    Procedure: LEFT TOTAL KNEE ARTHROPLASTY;  Surgeon: Daren Coleman MD;  Location: [facility] Main OR     BIOPSY  , ?    leg, nose     COLONOSCOPY  2012    Procedure: COLONOSCOPY;  Colonoscopy;  Surgeon: Drew Perdue MD;  Location: [facility]     ORTHOPEDIC SURGERY Left ,     broke radius by wrist... plate and 9 screws put in, arthrosc     SURGICAL HISTORY OF -   2001    Lymph Node Excision on left leg     SURGICAL HISTORY OF -   2009    BCC removal right nasal area     ZZC TOTAL KNEE ARTHROPLASTY Right 2015     Family History   Problem Relation Age of Onset     C.A.D. Father         heart problems at age 40     Arthritis Father         bilat knee replacements \"bowlegged\"     Coronary Artery Disease Father         heart attack 43 years ago     Asthma Father      Lipids Mother      Arthritis Mother         knees/fingers     Hypertension Mother      Other Cancer Mother         uterine cancer, 2016     Cardiovascular Paternal Grandfather      Coronary Artery Disease Paternal Grandfather          of heart attack     Neurologic Disorder Maternal Grandfather         park     Diabetes Maternal Aunt      Osteoporosis Paternal Grandmother      Arthritis Son         spondylolysis     Coronary Artery Disease Other         Congestive heart failure     Asthma Other         paternal aunt, adult onset     Asthma Other         paternal aunt     Social History     Tobacco Use     Smoking status: Never     Smokeless tobacco: Never   Substance Use Topics     Alcohol use: No      History   Drug Use No     Current Outpatient Medications   Medication Sig Dispense Refill     acetaminophen (TYLENOL) 500 MG tablet Take 500-1,000 mg by mouth every 6 hours as needed for mild pain       aspirin (ASA) " 325 MG EC tablet Take 1 tablet (325 mg) by mouth daily 42 tablet 0     Calcium Citrate-Vitamin D (CALCIUM + D PO) Take 1 tablet by mouth daily       IBANdronate (BONIVA) 150 MG tablet Take 1 tablet (150 mg) by mouth every 30 days 3 tablet 3     ibuprofen (ADVIL/MOTRIN) 200 MG tablet Take 600 mg by mouth every 8 hours as needed for pain       levothyroxine (SYNTHROID/LEVOTHROID) 50 MCG tablet Take 1 tablet (50 mcg) by mouth daily 90 tablet 3     methocarbamol (ROBAXIN) 500 MG tablet Take 500 mg by mouth 4 times daily as needed for muscle spasms       oxyCODONE (ROXICODONE) 5 MG tablet Take 1 tablet (5 mg) by mouth every 4 hours as needed for moderate pain (4-6) Take 1 pill for moderate pain (4-6/10) and 2 pills for severe pain (7-10/10). Maximum 6 pills per day. Alternate with Tylenol. 33 tablet 0     senna-docusate (SENOKOT-S/PERICOLACE) 8.6-50 MG tablet Take 1-2 tablets by mouth 2 times daily Take while on oral narcotics to prevent or treat constipation. 30 tablet 0     UNABLE TO FIND MEDICATION NAME: Melaleuca PM - Longevity  2 CardiOmegaEPA (Omega 3 Fishoils - 1000 mg EPG and 100 mg DHA)  2 Vitality Multivitamin and Mineral (Iron, Vitamin K, Folic acid, Biotin, Calcium)  2 CellWise Antioxidant (Olive extract, grape seed extract, Vitamin C, carotenoids)  2 ProveXCV (Blood pressure support)  2 RecoverAl (Inflammation)  1 Florify (acid resistance, probiotic)  -1 Vitality [place] (omega 3, Brain eye and heart)  -2 Acuity [place] (Brain health)  -1 K2-03 (Redirects calcium bone health)  -1Replenex Extra strength   -1 Nutraview (Vision- macular/lens health)  1 CoQ10 +Cellular Energy Support (antioxidants)       Allergies   Allergen Reactions     Codeine Difficulty breathing     Also felt light headed.  After her arthroscopy 16-18 hour after surgery ( had been taking the Tylenol #3 ) did have an episode with lightheadedness and difficulty breathing/. Also felt like her neck has gotten stiff, couldn't move.  Lasting less  "than 5 min.   Has been able to take Tylenol since then without problems.       Dye [Contrast Dye] Other (See Comments)     congestion     Physical Exam:  /64   Pulse 113   Temp 99.1  F (37.3  C) (Tympanic)   Resp 18   Ht 1.651 m (5' 5\")   Wt 67.6 kg (149 lb)   SpO2 99%   BMI 24.79 kg/m      Constitutional- No acute distress, well nourished, non-toxic  Eyes: Anicteric, no injection.  PERRL  ENT:  Normocephalic, atraumatic, Nose midline, dry mucus membranes, NG in place  Neck - supple, no LAD  Abdomen - Minimal distension, non-tender.  No R/R/G  Groins - 2+ pulses bilaterally and no LAD, no masses  Rectal - good tone, no masses, guaiac negative  Neuro - No focal neuro deficits, Alert and oriented x 3  Psych: Appropriate mood and affect  Musculoskeletal: Normal gait, symmetric strength.  FROM upper and lower extremities.  Skin: Warm, Dry; Dressing C/D/I L knee with some strikethrough    Lab Results   Component Value Date    WBC 6.0 12/09/2022    WBC 4.8 02/10/2021     Lab Results   Component Value Date    RBC 3.63 12/09/2022    RBC 4.20 02/10/2021     Lab Results   Component Value Date    HGB 11.1 12/09/2022    HGB 12.9 02/10/2021     Lab Results   Component Value Date    HCT 33.8 12/09/2022    HCT 39.9 02/10/2021     Lab Results   Component Value Date    MCV 93 12/09/2022    MCV 95 02/10/2021     Lab Results   Component Value Date    MCH 30.6 12/09/2022    MCH 30.7 02/10/2021     Lab Results   Component Value Date    MCHC 32.8 12/09/2022    MCHC 32.3 02/10/2021     Lab Results   Component Value Date    RDW 13.3 12/09/2022    RDW 12.8 02/10/2021     Lab Results   Component Value Date     12/09/2022     02/10/2021     Last Comprehensive Metabolic Panel:  Sodium   Date Value Ref Range Status   12/09/2022 137 136 - 145 mmol/L Final   02/10/2021 138 133 - 144 mmol/L Final     Potassium   Date Value Ref Range Status   12/09/2022 3.9 3.4 - 5.3 mmol/L Final   12/05/2022 4.3 3.5 - 5.0 mmol/L Final "   02/10/2021 4.8 3.4 - 5.3 mmol/L Final     Chloride   Date Value Ref Range Status   12/09/2022 95 (L) 98 - 107 mmol/L Final   07/14/2022 106 94 - 109 mmol/L Final   02/10/2021 105 94 - 109 mmol/L Final     Carbon Dioxide   Date Value Ref Range Status   02/10/2021 28 20 - 32 mmol/L Final     Carbon Dioxide (CO2)   Date Value Ref Range Status   12/09/2022 32 (H) 22 - 29 mmol/L Final   07/14/2022 27 20 - 32 mmol/L Final     Anion Gap   Date Value Ref Range Status   12/09/2022 10 7 - 15 mmol/L Final   07/14/2022 7 3 - 14 mmol/L Final   02/10/2021 5 3 - 14 mmol/L Final     Glucose   Date Value Ref Range Status   12/09/2022 125 (H) 70 - 99 mg/dL Final   12/06/2022 95 70 - 125 mg/dL Final   02/10/2021 88 70 - 99 mg/dL Final     Urea Nitrogen   Date Value Ref Range Status   12/09/2022 16.3 8.0 - 23.0 mg/dL Final   07/14/2022 18 7 - 30 mg/dL Final   02/10/2021 17 7 - 30 mg/dL Final     Creatinine   Date Value Ref Range Status   12/09/2022 0.86 0.51 - 0.95 mg/dL Final   02/10/2021 0.85 0.52 - 1.04 mg/dL Final     GFR Estimate   Date Value Ref Range Status   12/09/2022 74 >60 mL/min/1.73m2 Final     Comment:     Effective December 21, 2021 eGFRcr in adults is calculated using the 2021 CKD-EPI creatinine equation which includes age and gender (Yara et al., NEJM, DOI: 10.1056/LPJJit6045230)   02/10/2021 73 >60 mL/min/[1.73_m2] Final     Comment:     Non  GFR Calc  Starting 12/18/2018, serum creatinine based estimated GFR (eGFR) will be   calculated using the Chronic Kidney Disease Epidemiology Collaboration   (CKD-EPI) equation.       Calcium   Date Value Ref Range Status   12/09/2022 9.2 8.8 - 10.2 mg/dL Final   02/10/2021 8.9 8.5 - 10.1 mg/dL Final     Bilirubin Total   Date Value Ref Range Status   12/09/2022 0.7 <=1.2 mg/dL Final   02/10/2021 0.5 0.2 - 1.3 mg/dL Final     Alkaline Phosphatase   Date Value Ref Range Status   12/09/2022 52 35 - 104 U/L Final   02/10/2021 51 40 - 150 U/L Final     ALT   Date  Value Ref Range Status   12/09/2022 12 10 - 35 U/L Final   02/10/2021 19 0 - 50 U/L Final     AST   Date Value Ref Range Status   12/09/2022 28 10 - 35 U/L Final   02/10/2021 18 0 - 45 U/L Final     Lipase   Date Value Ref Range Status   12/09/2022 17 13 - 60 U/L Final   12/03/2009 145 20 - 250 U/L Final     CT Abdomen/Pelvis: FINDINGS:   LOWER CHEST: Normal.     HEPATOBILIARY: Decompression of the gallbladder. No acute liver  abnormality.     PANCREAS: Normal.     SPLEEN: Normal.     ADRENAL GLANDS: Normal.     KIDNEYS/BLADDER: Normal.     BOWEL: Multiple dilated proximal to mid small bowel loops. Distal  small bowel loops are decompressed. There appears to be relative  gradual transition to decompression. No abscess or free air. Small  free pelvic fluid. Appendix not seen. No signs of appendicitis.     LYMPH NODES: Normal.     VASCULATURE: Scattered calcifications.      PELVIC ORGANS: Small free pelvic fluid.     OTHER: None.     MUSCULOSKELETAL: Mild spine degenerative change.                                                                      IMPRESSION:   1.  Small bowel obstruction evidenced by prominently dilated proximal  to mid small bowel loops. No free air or abscess. Trace pelvic free  fluid.     MIGUEL ANGEL VERA MD     All imaging reviewed by me    Assessment:  1. pSBO  2. S/P L TKA    Plan:   Mrs. Cortez presents with abdominal discomfort following L TKA.  I performed an independent history, physical, exam, reviewed her images.  She has evidence of SBO with gradual transition in mid small bowel.  There is no specific transition point.  I discussed the nature of small bowel obstructions with the patient.  Differential includes SBO, ileus vs other.  I do not see evidence of closed loop.  She has had an NG placed in the ED.  I recommend decompression overnight.  We will check Mg, Phos.  Encourage ambulation.  Likely gastrograffin challenge in AM if no significant change.  Discussed potential of operative  intervention with patient and .  All questions answered.  Total time 45 minutes in charting, review, direct patient care and coordination with over 50% face to face.      Bimal Edwards,  on 12/9/2022 at 3:36 PM

## 2022-12-09 NOTE — ED TRIAGE NOTES
Pt reports nausea and vomiting that started yesterday, pt reports also feeling constipated. Pt hasnt been able to keep much down since yesterday. Pt has a total knee replacement done on Monday.      Triage Assessment     Row Name 12/09/22 1147       [place] Coma Scale    Best Eye Response 4-->(E4) spontaneous    Best Motor Response 6-->(M6) obeys commands    Best Verbal Response 5-->(V5) oriented    [place] Coma Scale Score 15

## 2022-12-09 NOTE — ED NOTES
"[facility]   Admission Handoff    The patient is Eleazar Cortez, 67 year old who arrived in the ED by WALKED from home with a complaint of Nausea & Vomiting (Pt reports nausea and vomiting that started yesterday, pt reports also feeling constipated. Pt hasnt been able to keep much down since yesterday. Pt has a total knee replacement done on Monday. )  . The patient's current symptoms are a recurrence of a past episode and during this time the symptoms have increased. In the ED, patient was diagnosed with   Final diagnoses:   SBO (small bowel obstruction) (H)   Nausea and vomiting, unspecified vomiting type         Needed?: No    Allergies:    Allergies   Allergen Reactions    Codeine Difficulty breathing     Also felt light headed.  After her arthroscopy 16-18 hour after surgery ( had been taking the Tylenol #3 ) did have an episode with lightheadedness and difficulty breathing/. Also felt like her neck has gotten stiff, couldn't move.  Lasting less than 5 min.   Has been able to take Tylenol since then without problems.      Dye [Contrast Dye] Other (See Comments)     congestion       Past Medical Hx:   Past Medical History:   Diagnosis Date    Arthritis 2014    in knee    BCC (basal cell carcinoma), face 2009    right lateral nose.  removed via Mohs    Hypothyroidism 10/11/2012    Osteoporosis     Other malignant lymphomas, unspecified site, extranodal and solid organ sites 2002    Non-Hodgkin's Lymphoma    Scoliosis (and kyphoscoliosis), idiopathic     Uncomplicated asthma childhood    Rarely... haven't noticed for a long time       Initial vitals were: BP: 124/64  Pulse: 113  Temp: 99.1  F (37.3  C)  Resp: 18  Height: 165.1 cm (5' 5\")  Weight: 67.6 kg (149 lb)  SpO2: 99 %   Recent vital Signs: /64   Pulse 113   Temp 99.1  F (37.3  C) (Tympanic)   Resp 18   Ht 1.651 m (5' 5\")   Wt 67.6 kg (149 lb)   SpO2 99%   BMI 24.79 kg/m      Elimination Status: Continent: " Yes     Activity Level: SBA w/ walker    Fall Status: Reason for falls risk:  Mobility  nonskid shoes/slippers when out of bed and arm band in place    Baseline Mental status: WDL  Current Mental Status changes: at basesline    Infection present or suspected this encounter: no  Sepsis suspected: No    Isolation type: standard    Bariatric equipment needed?: No    In the ED these meds were given:   Medications   0.9% sodium chloride BOLUS (0 mLs Intravenous Stopped 12/9/22 1444)     Followed by   sodium chloride 0.9% infusion ( Intravenous New Bag 12/9/22 1451)   ondansetron (ZOFRAN) injection 4 mg (4 mg Intravenous Given 12/9/22 1452)   benzocaine-menthol (CEPACOL) 15-3.6 MG lozenge 1 lozenge (has no administration in time range)       Drips running?  Yes    Home pump  No    Current LDAs: Peripheral IV: Site RAC; Gauge 20  normal saline     Results:   Labs/Imaging  Ordered and Resulted from Time of ED Arrival Up to the Time of Departure from the ED  Results for orders placed or performed during the hospital encounter of 12/09/22 (from the past 24 hour(s))   CBC with platelets differential    Narrative    The following orders were created for panel order CBC with platelets differential.  Procedure                               Abnormality         Status                     ---------                               -----------         ------                     CBC with platelets and d...[[id number]]  Abnormal            Final result                 Please view results for these tests on the individual orders.   Comprehensive metabolic panel   Result Value Ref Range    Sodium 137 136 - 145 mmol/L    Potassium 3.9 3.4 - 5.3 mmol/L    Chloride 95 (L) 98 - 107 mmol/L    Carbon Dioxide (CO2) 32 (H) 22 - 29 mmol/L    Anion Gap 10 7 - 15 mmol/L    Urea Nitrogen 16.3 8.0 - 23.0 mg/dL    Creatinine 0.86 0.51 - 0.95 mg/dL    Calcium 9.2 8.8 - 10.2 mg/dL    Glucose 125 (H) 70 - 99 mg/dL    Alkaline Phosphatase 52 35 - 104 U/L     AST 28 10 - 35 U/L    ALT 12 10 - 35 U/L    Protein Total 7.6 6.4 - 8.3 g/dL    Albumin 4.0 3.5 - 5.2 g/dL    Bilirubin Total 0.7 <=1.2 mg/dL    GFR Estimate 74 >60 mL/min/1.73m2   Lipase   Result Value Ref Range    Lipase 17 13 - 60 U/L   [place] Draw    Narrative    The following orders were created for panel order [place] Draw.  Procedure                               Abnormality         Status                     ---------                               -----------         ------                     Extra Blue Top Tube[[id number]]                              Final result               Extra Red Top Tube[[id number]]                               Final result                 Please view results for these tests on the individual orders.   CBC with platelets and differential   Result Value Ref Range    WBC Count 6.0 4.0 - 11.0 10e3/uL    RBC Count 3.63 (L) 3.80 - 5.20 10e6/uL    Hemoglobin 11.1 (L) 11.7 - 15.7 g/dL    Hematocrit 33.8 (L) 35.0 - 47.0 %    MCV 93 78 - 100 fL    MCH 30.6 26.5 - 33.0 pg    MCHC 32.8 31.5 - 36.5 g/dL    RDW 13.3 10.0 - 15.0 %    Platelet Count 338 150 - 450 10e3/uL    % Neutrophils 75 %    % Lymphocytes 11 %    % Monocytes 12 %    % Eosinophils 1 %    % Basophils 1 %    % Immature Granulocytes 0 %    NRBCs per 100 WBC 0 <1 /100    Absolute Neutrophils 4.5 1.6 - 8.3 10e3/uL    Absolute Lymphocytes 0.7 (L) 0.8 - 5.3 10e3/uL    Absolute Monocytes 0.7 0.0 - 1.3 10e3/uL    Absolute Eosinophils 0.0 0.0 - 0.7 10e3/uL    Absolute Basophils 0.0 0.0 - 0.2 10e3/uL    Absolute Immature Granulocytes 0.0 <=0.4 10e3/uL    Absolute NRBCs 0.0 10e3/uL   Extra Blue Top Tube   Result Value Ref Range    Hold Specimen JIC    Extra Red Top Tube   Result Value Ref Range    Hold Specimen JIC    CT Abdomen Pelvis w/o Contrast    Narrative    CT ABDOMEN AND PELVIS WITHOUT CONTRAST 12/9/2022 1:41 PM    CLINICAL HISTORY: Nausea, vomiting. Rule out obstruction.  TECHNIQUE: CT scan of the abdomen and pelvis was  performed without IV  contrast. Multiplanar reformats were obtained. Dose reduction  techniques were used.  CONTRAST: None.    COMPARISON: None.    FINDINGS:   LOWER CHEST: Normal.    HEPATOBILIARY: Decompression of the gallbladder. No acute liver  abnormality.    PANCREAS: Normal.    SPLEEN: Normal.    ADRENAL GLANDS: Normal.    KIDNEYS/BLADDER: Normal.    BOWEL: Multiple dilated proximal to mid small bowel loops. Distal  small bowel loops are decompressed. There appears to be relative  gradual transition to decompression. No abscess or free air. Small  free pelvic fluid. Appendix not seen. No signs of appendicitis.    LYMPH NODES: Normal.    VASCULATURE: Scattered calcifications.     PELVIC ORGANS: Small free pelvic fluid.    OTHER: None.    MUSCULOSKELETAL: Mild spine degenerative change.      Impression    IMPRESSION:   1.  Small bowel obstruction evidenced by prominently dilated proximal  to mid small bowel loops. No free air or abscess. Trace pelvic free  fluid.    MIGUEL ANGEL VERA MD         SYSTEM ID:  [id number]       For the majority of the shift this patient's behavior was Green     Cardiac Rhythm: N/A  Pt needs tele? No  Skin/wound Issues:  surgical site left total knee    Code Status: Full Code    Pain control: pt had none    Nausea control: fair    Abnormal labs/tests/findings requiring intervention: SBO    Patient tested for COVID 19 prior to admission: YES     OBS brochure/video discussed/provided to patient/family: N/A     Family present during ED course? Yes     Family Comments/Social Situation comments: lives with spouse    Tasks needing completion: None    Lyubov Campbell RN

## 2022-12-09 NOTE — ED PROVIDER NOTES
History     Chief Complaint   Patient presents with     Nausea & Vomiting     Pt reports nausea and vomiting that started yesterday, pt reports also feeling constipated. Pt hasnt been able to keep much down since yesterday. Pt has a total knee replacement done on Monday.      HPI  Eleazar Cortez is a 67 year old female who has past medical history significant for left knee replacement that was performed on Monday.  Patient states that the procedure went well.  She has otherwise been ambulatory since the surgery.  However, patient has had issues keeping food, in addition to liquids down, especially over the past 1 day.  She states that on Tuesday she had small amounts of soup.  On Wednesday, patient was eating okay, however beginning yesterday she has had multiple episodes of nonbloody, nonbilious emesis, not able to keep any food, or liquids, or water down.  No prior history of abdominal surgeries.  No prior history of bowel obstructions.  Denies any urinary symptoms.  Patient has been taking approximately 1 tablet, 5 mg oxycodone every 4 hours for pain secondary to the knee replacement.    Allergies:  Allergies   Allergen Reactions     Codeine Difficulty breathing     Also felt light headed.  After her arthroscopy 16-18 hour after surgery ( had been taking the Tylenol #3 ) did have an episode with lightheadedness and difficulty breathing/. Also felt like her neck has gotten stiff, couldn't move.  Lasting less than 5 min.   Has been able to take Tylenol since then without problems.       Dye [Contrast Dye] Other (See Comments)     congestion       Problem List:    Patient Active Problem List    Diagnosis Date Noted     SBO (small bowel obstruction) (H) 12/09/2022     Priority: Medium     Nausea and vomiting, unspecified vomiting type 12/09/2022     Priority: Medium     Osteoporosis without current pathological fracture, unspecified osteoporosis type 07/15/2022     Priority: Medium     Lymphoma in remission (H)  02/10/2021     Priority: Medium     Superficial basal cell carcinoma 01/15/2020     Priority: Medium     Shoulder pain, right 10/27/2016     Priority: Medium     Knee pain 03/26/2015     Priority: Medium     Other postprocedural status(V45.89) 03/26/2015     Priority: Medium     Tear of lateral cartilage or meniscus of knee, current 06/15/2014     Priority: Medium     Colon polyp, hyperplastic 10/11/2012     Priority: Medium     Environmental allergies 10/11/2012     Priority: Medium     Hypothyroidism 10/11/2012     Priority: Medium     CARDIOVASCULAR SCREENING; LDL GOAL LESS THAN 160 10/31/2010     Priority: Medium     Seborrheic Keratosis right leg 02/23/2009     Priority: Medium             Basal Cell Carcinoma of right lower medial Eyelid 02/16/2009     Priority: Medium     Dr.J Rogers Mohs excision, four stages  04/30/09 normal Skin exam no signs of recurrent malignancy. Eyelid doing well       Atrophic vaginitis 11/22/2008     Priority: Medium     Benign neoplasm of colon 12/27/2006     Priority: Medium     12/27/06- colonoscopy revealed 2 hyperplastic polyps                  Plan repeat colonoscopy in 5 years       NONHODGKIN'S LYMPHOMA IN REMISSION 07/07/2006     Priority: Medium     Diagnosed in 2002: Stage I Non-Hodgkin's Lymphoma, Grade I, Follicular, Left Groin.  Fully resected, radiation.  No evidence of disease since.  6/28/2006 Dr Bri Kaur @ [facility]:  Last CT scan normal. CBC normal.  Plan: RTC in 6 months, with a CT prior.  12/18/08 @ [facility]:Ct 12/15/08 NL, CBC NL RTC in 2 years. No reacurrance of Dz       Disorder of bone and cartilage 12/12/2005     Priority: Medium     Osteopenia: Dexa in 12/2005 shows stable bone density which is moderately thin in hip area but normal in spine.   Would recommend she continue with fosamax  Problem list name updated by automated process. Provider to review          Past Medical History:    Past Medical  "History:   Diagnosis Date     Arthritis 2014     BCC (basal cell carcinoma), face      Hypothyroidism 10/11/2012     Osteoporosis      Other malignant lymphomas, unspecified site, extranodal and solid organ sites      Scoliosis (and kyphoscoliosis), idiopathic      Uncomplicated asthma childhood       Past Surgical History:    Past Surgical History:   Procedure Laterality Date     ARTHROPLASTY KNEE Left 2022    Procedure: LEFT TOTAL KNEE ARTHROPLASTY;  Surgeon: Daren Coleman MD;  Location: [facility] OR     BIOPSY  , ?    leg, nose     COLONOSCOPY  2012    Procedure: COLONOSCOPY;  Colonoscopy;  Surgeon: Drew Perdue MD;  Location: [facility]     ORTHOPEDIC SURGERY Left ,     broke radius by wrist... plate and 9 screws put in, arthrosc     SURGICAL HISTORY OF -   2001    Lymph Node Excision on left leg     SURGICAL HISTORY OF -   2009    BCC removal right nasal area     ZZC TOTAL KNEE ARTHROPLASTY Right 2015       Family History:    Family History   Problem Relation Age of Onset     C.A.D. Father         heart problems at age 40     Arthritis Father         bilat knee replacements \"bowlegged\"     Coronary Artery Disease Father         heart attack 43 years ago     Asthma Father      Lipids Mother      Arthritis Mother         knees/fingers     Hypertension Mother      Other Cancer Mother         uterine cancer, 2016     Cardiovascular Paternal Grandfather      Coronary Artery Disease Paternal Grandfather          of heart attack     Neurologic Disorder Maternal Grandfather         park     Diabetes Maternal Aunt      Osteoporosis Paternal Grandmother      Arthritis Son         spondylolysis     Coronary Artery Disease Other         Congestive heart failure     Asthma Other         paternal aunt, adult onset     Asthma Other         paternal aunt       Social History:  Marital Status:   [2]  Social History     Tobacco Use     Smoking status: Never " "    Smokeless tobacco: Never   Substance Use Topics     Alcohol use: No     Drug use: No        Medications:    acetaminophen (TYLENOL) 500 MG tablet  aspirin (ASA) 325 MG EC tablet  Calcium Citrate-Vitamin D (CALCIUM + D PO)  IBANdronate (BONIVA) 150 MG tablet  ibuprofen (ADVIL/MOTRIN) 200 MG tablet  levothyroxine (SYNTHROID/LEVOTHROID) 50 MCG tablet  methocarbamol (ROBAXIN) 500 MG tablet  oxyCODONE (ROXICODONE) 5 MG tablet  senna-docusate (SENOKOT-S/PERICOLACE) 8.6-50 MG tablet  UNABLE TO FIND          Review of Systems   Constitutional: Negative for fever.   Respiratory: Negative for shortness of breath.    Cardiovascular: Negative for chest pain.   Gastrointestinal: Positive for abdominal pain, nausea and vomiting.   All other systems reviewed and are negative.      Physical Exam   BP: 124/64  Pulse: 113  Temp: 99.1  F (37.3  C)  Resp: 18  Height: 165.1 cm (5' 5\")  Weight: 67.6 kg (149 lb)  SpO2: 99 %      Physical Exam  /64   Pulse 113   Temp 99.1  F (37.3  C) (Tympanic)   Resp 18   Ht 1.651 m (5' 5\")   Wt 67.6 kg (149 lb)   SpO2 99%   BMI 24.79 kg/m    General: alert, interactive, in no apparent distress  Head: atraumatic  Nose: no rhinorrhea or epistaxis  Ears: no external auditory canal discharge or bleeding.    Eyes: Sclera nonicteric. Conjunctiva noninjected.    Mouth: no tonsillar erythema, edema, or exudate  Neck: supple, no palp LAD  Lungs: CTAB  CV: RRR, S1/S2; peripheral pulses palpable and symmetric  Abdomen: soft, nt, nd, no guarding or rebound. Positive bowel sounds  Extremities: no cyanosis or edema  Skin: no rash or diaphoresis  Neuro:  strength 5/5 in UE and LEs bilaterally, sensation intact to light touch in UE and LEs bilaterally;       ED Course                 Procedures              Critical Care time:  none               Results for orders placed or performed during the hospital encounter of 12/09/22 (from the past 24 hour(s))   CBC with platelets differential    Narrative    " The following orders were created for panel order CBC with platelets differential.  Procedure                               Abnormality         Status                     ---------                               -----------         ------                     CBC with platelets and d...[[id number]]  Abnormal            Final result                 Please view results for these tests on the individual orders.   Comprehensive metabolic panel   Result Value Ref Range    Sodium 137 136 - 145 mmol/L    Potassium 3.9 3.4 - 5.3 mmol/L    Chloride 95 (L) 98 - 107 mmol/L    Carbon Dioxide (CO2) 32 (H) 22 - 29 mmol/L    Anion Gap 10 7 - 15 mmol/L    Urea Nitrogen 16.3 8.0 - 23.0 mg/dL    Creatinine 0.86 0.51 - 0.95 mg/dL    Calcium 9.2 8.8 - 10.2 mg/dL    Glucose 125 (H) 70 - 99 mg/dL    Alkaline Phosphatase 52 35 - 104 U/L    AST 28 10 - 35 U/L    ALT 12 10 - 35 U/L    Protein Total 7.6 6.4 - 8.3 g/dL    Albumin 4.0 3.5 - 5.2 g/dL    Bilirubin Total 0.7 <=1.2 mg/dL    GFR Estimate 74 >60 mL/min/1.73m2   Lipase   Result Value Ref Range    Lipase 17 13 - 60 U/L   [place] Draw    Narrative    The following orders were created for panel order [place] Draw.  Procedure                               Abnormality         Status                     ---------                               -----------         ------                     Extra Blue Top Tube[[id number]]                              Final result               Extra Red Top Tube[[id number]]                               Final result                 Please view results for these tests on the individual orders.   CBC with platelets and differential   Result Value Ref Range    WBC Count 6.0 4.0 - 11.0 10e3/uL    RBC Count 3.63 (L) 3.80 - 5.20 10e6/uL    Hemoglobin 11.1 (L) 11.7 - 15.7 g/dL    Hematocrit 33.8 (L) 35.0 - 47.0 %    MCV 93 78 - 100 fL    MCH 30.6 26.5 - 33.0 pg    MCHC 32.8 31.5 - 36.5 g/dL    RDW 13.3 10.0 - 15.0 %    Platelet Count 338 150 - 450 10e3/uL    %  Neutrophils 75 %    % Lymphocytes 11 %    % Monocytes 12 %    % Eosinophils 1 %    % Basophils 1 %    % Immature Granulocytes 0 %    NRBCs per 100 WBC 0 <1 /100    Absolute Neutrophils 4.5 1.6 - 8.3 10e3/uL    Absolute Lymphocytes 0.7 (L) 0.8 - 5.3 10e3/uL    Absolute Monocytes 0.7 0.0 - 1.3 10e3/uL    Absolute Eosinophils 0.0 0.0 - 0.7 10e3/uL    Absolute Basophils 0.0 0.0 - 0.2 10e3/uL    Absolute Immature Granulocytes 0.0 <=0.4 10e3/uL    Absolute NRBCs 0.0 10e3/uL   Extra Blue Top Tube   Result Value Ref Range    Hold Specimen JIC    Extra Red Top Tube   Result Value Ref Range    Hold Specimen JIC    CT Abdomen Pelvis w/o Contrast    Narrative    CT ABDOMEN AND PELVIS WITHOUT CONTRAST 12/9/2022 1:41 PM    CLINICAL HISTORY: Nausea, vomiting. Rule out obstruction.  TECHNIQUE: CT scan of the abdomen and pelvis was performed without IV  contrast. Multiplanar reformats were obtained. Dose reduction  techniques were used.  CONTRAST: None.    COMPARISON: None.    FINDINGS:   LOWER CHEST: Normal.    HEPATOBILIARY: Decompression of the gallbladder. No acute liver  abnormality.    PANCREAS: Normal.    SPLEEN: Normal.    ADRENAL GLANDS: Normal.    KIDNEYS/BLADDER: Normal.    BOWEL: Multiple dilated proximal to mid small bowel loops. Distal  small bowel loops are decompressed. There appears to be relative  gradual transition to decompression. No abscess or free air. Small  free pelvic fluid. Appendix not seen. No signs of appendicitis.    LYMPH NODES: Normal.    VASCULATURE: Scattered calcifications.     PELVIC ORGANS: Small free pelvic fluid.    OTHER: None.    MUSCULOSKELETAL: Mild spine degenerative change.      Impression    IMPRESSION:   1.  Small bowel obstruction evidenced by prominently dilated proximal  to mid small bowel loops. No free air or abscess. Trace pelvic free  fluid.    MIGUEL ANGEL VERA MD         SYSTEM ID:  [id number]       Medications   0.9% sodium chloride BOLUS (0 mLs Intravenous Stopped 12/9/22 8698)      Followed by   sodium chloride 0.9% infusion ( Intravenous New Bag 12/9/22 1451)   ondansetron (ZOFRAN) injection 4 mg (4 mg Intravenous Given 12/9/22 5592)   benzocaine-menthol (CEPACOL) 15-3.6 MG lozenge 1 lozenge (has no administration in time range)       Assessments & Plan (with Medical Decision Making)  67 year old female presenting the emergency department with nausea, vomiting, in the context of recent surgical procedure, taking oral narcotics at home.    Patient with mild diffuse abdominal discomfort, with associated nausea, and vomiting, concerning for possible obstruction.  CBC normal, with normal white blood cell count.  Slight anemia, however did have the recent surgical procedure of likely contributing.  Hemoglobin stable at 11.1.  CMP unremarkable.  Lipase normal.  CT scan showing bowel obstruction.  I did discuss with general surgeon on-call, Dr. Edwards.  He will follow along.  Patient without any prior abdominal surgeries, with no obvious cause or source of bowel obstruction.  Patient with NG tube which has been placed.  Zofran administered in addition to IV fluids.    Will admit patient to hospitalist service, and patient accepted by hospitalist, Dr. Arnold     I have reviewed the nursing notes.    I have reviewed the findings, diagnosis, plan and need for follow up with the patient.       New Prescriptions    No medications on file       Final diagnoses:   SBO (small bowel obstruction) (H)   Nausea and vomiting, unspecified vomiting type       12/9/2022   [facility] EMERGENCY DEPT     Tolu Piper MD  12/09/22 9562

## 2022-12-10 ENCOUNTER — APPOINTMENT (OUTPATIENT)
Dept: GENERAL RADIOLOGY | Facility: CLINIC | Age: 67
DRG: 390 | End: 2022-12-10
Attending: SURGERY
Payer: COMMERCIAL

## 2022-12-10 ENCOUNTER — APPOINTMENT (OUTPATIENT)
Dept: PHYSICAL THERAPY | Facility: CLINIC | Age: 67
DRG: 390 | End: 2022-12-10
Payer: COMMERCIAL

## 2022-12-10 PROCEDURE — 99231 SBSQ HOSP IP/OBS SF/LOW 25: CPT | Performed by: SURGERY

## 2022-12-10 PROCEDURE — 250N000013 HC RX MED GY IP 250 OP 250 PS 637: Performed by: EMERGENCY MEDICINE

## 2022-12-10 PROCEDURE — 97161 PT EVAL LOW COMPLEX 20 MIN: CPT | Mod: GP | Performed by: PHYSICAL THERAPIST

## 2022-12-10 PROCEDURE — 250N000011 HC RX IP 250 OP 636: Performed by: INTERNAL MEDICINE

## 2022-12-10 PROCEDURE — 120N000001 HC R&B MED SURG/OB

## 2022-12-10 PROCEDURE — 97110 THERAPEUTIC EXERCISES: CPT | Mod: GP | Performed by: PHYSICAL THERAPIST

## 2022-12-10 PROCEDURE — 258N000003 HC RX IP 258 OP 636: Performed by: EMERGENCY MEDICINE

## 2022-12-10 PROCEDURE — 250N000013 HC RX MED GY IP 250 OP 250 PS 637: Performed by: NURSE PRACTITIONER

## 2022-12-10 PROCEDURE — 99232 SBSQ HOSP IP/OBS MODERATE 35: CPT | Performed by: INTERNAL MEDICINE

## 2022-12-10 PROCEDURE — 74018 RADEX ABDOMEN 1 VIEW: CPT

## 2022-12-10 RX ORDER — ENOXAPARIN SODIUM 100 MG/ML
40 INJECTION SUBCUTANEOUS EVERY 24 HOURS
Status: DISCONTINUED | OUTPATIENT
Start: 2022-12-10 | End: 2022-12-12 | Stop reason: HOSPADM

## 2022-12-10 RX ADMIN — SODIUM CHLORIDE: 9 INJECTION, SOLUTION INTRAVENOUS at 12:13

## 2022-12-10 RX ADMIN — LEVOTHYROXINE SODIUM 50 MCG: 0.05 TABLET ORAL at 07:33

## 2022-12-10 RX ADMIN — Medication 1 LOZENGE: at 11:23

## 2022-12-10 RX ADMIN — DIATRIZOATE MEGLUMINE AND DIATRIZOATE SODIUM 75 ML: 660; 100 SOLUTION ORAL; RECTAL at 09:32

## 2022-12-10 RX ADMIN — SODIUM CHLORIDE: 9 INJECTION, SOLUTION INTRAVENOUS at 04:54

## 2022-12-10 RX ADMIN — Medication 1 LOZENGE: at 13:41

## 2022-12-10 RX ADMIN — ENOXAPARIN SODIUM 40 MG: 40 INJECTION SUBCUTANEOUS at 11:22

## 2022-12-10 RX ADMIN — Medication 1 LOZENGE: at 01:37

## 2022-12-10 ASSESSMENT — ACTIVITIES OF DAILY LIVING (ADL)
ADLS_ACUITY_SCORE: 20
ADLS_ACUITY_SCORE: 22
ADLS_ACUITY_SCORE: 20
ADLS_ACUITY_SCORE: 22
ADLS_ACUITY_SCORE: 20
ADLS_ACUITY_SCORE: 22
ADLS_ACUITY_SCORE: 20
ADLS_ACUITY_SCORE: 22

## 2022-12-10 NOTE — PROGRESS NOTES
[facility]    Hospitalist Progress Note    Date of Service (when I saw the patient): 12/10/2022    Assessment & Plan   Eleazar Cortez is a 67 year old female who was admitted on 12/9/2022 with a small bowel obstruction.    Small bowel obstruction  CT scan abdomen pelvis without contrast demonstrates small bowel obstruction with proximally dilated proximal to mid small bowel loops.  No free air or abscess.  Trace pelvic free fluid.  NG tube placed in the ED and currently on low intermittent suction.  -Appreciate general surgery consult  -Gastrografin study  -We will give 1 L bolus normal saline for sinus tachycardia then resume NS at 125 mL/h  -Daily BMP  -Watch for recovery of bowel function, not passing stool or flatus yet  -Continue n.p.o. except meds    Hypothyroidism  Continue levothyroxine.    Asthma  No evidence of acute exacerbation    DVT Prophylaxis: Enoxaparin (Lovenox) SQ  Code Status: Full Code    Disposition: Expected discharge in 1-3 days once bowel function returns.    Tyshawn Mendoza MD    Interval History   The patient is resting in bed.  She denies pain aside from minor throat irritation.  She is not passing stool or flatus.    -Data reviewed today: I reviewed all new labs and imaging results over the last 24 hours. I personally reviewed no images or EKG's today.    Physical Exam   Temp: 98.5  F (36.9  C) Temp src: Oral BP: 128/66 Pulse: 94   Resp: 16 SpO2: 95 % O2 Device: None (Room air)    Vitals:    12/09/22 1144 12/09/22 1728   Weight: 67.6 kg (149 lb) 67.2 kg (148 lb 2.4 oz)     Vital Signs with Ranges  Temp:  [98.4  F (36.9  C)-99.1  F (37.3  C)] 98.5  F (36.9  C)  Pulse:  [] 94  Resp:  [16-18] 16  BP: (124-140)/(64-69) 128/66  SpO2:  [93 %-99 %] 95 %  No intake/output data recorded.    Gen: Well nourished, well developed, alert and oriented x 3, no acute distressed  HEENT: Atraumatic, normocephalic; sclera non-injected, anicterric; oral mucosa moist, no  lesion, no exudate  Lungs: Clear to ausculation, no wheezes, no rhonchi, no rales  Heart: Regular rate, regular rhythm, no gallops, no rubs, no murmurs  GI: Bowel sound normal, no hepatosplenomegaly, no masses, non-tender, non-distended, no guarding, no rebound tenderness  Lymph: No lymphadenopathy, no edema  Skin: No rashes, no chronic venous stasis     Medications     sodium chloride 125 mL/hr at 12/10/22 0454       aspirin  325 mg Oral At Bedtime     levothyroxine  50 mcg Oral Daily       Data   Recent Labs   Lab 12/09/22  1307 12/06/22  0826 12/05/22  1017   WBC 6.0  --  5.3   HGB 11.1* 10.1* 12.1   MCV 93  --  93     --  312     --   --    POTASSIUM 3.9  --  4.3   CHLORIDE 95*  --   --    CO2 32*  --   --    BUN 16.3  --   --    CR 0.86  --  0.84   ANIONGAP 10  --   --    ABE 9.2  --   --    * 95  --    ALBUMIN 4.0  --   --    PROTTOTAL 7.6  --   --    BILITOTAL 0.7  --   --    ALKPHOS 52  --   --    ALT 12  --   --    AST 28  --   --    LIPASE 17  --   --        Recent Results (from the past 24 hour(s))   CT Abdomen Pelvis w/o Contrast    Narrative    CT ABDOMEN AND PELVIS WITHOUT CONTRAST 12/9/2022 1:41 PM    CLINICAL HISTORY: Nausea, vomiting. Rule out obstruction.  TECHNIQUE: CT scan of the abdomen and pelvis was performed without IV  contrast. Multiplanar reformats were obtained. Dose reduction  techniques were used.  CONTRAST: None.    COMPARISON: None.    FINDINGS:   LOWER CHEST: Normal.    HEPATOBILIARY: Decompression of the gallbladder. No acute liver  abnormality.    PANCREAS: Normal.    SPLEEN: Normal.    ADRENAL GLANDS: Normal.    KIDNEYS/BLADDER: Normal.    BOWEL: Multiple dilated proximal to mid small bowel loops. Distal  small bowel loops are decompressed. There appears to be relative  gradual transition to decompression. No abscess or free air. Small  free pelvic fluid. Appendix not seen. No signs of appendicitis.    LYMPH NODES: Normal.    VASCULATURE: Scattered  calcifications.     PELVIC ORGANS: Small free pelvic fluid.    OTHER: None.    MUSCULOSKELETAL: Mild spine degenerative change.      Impression    IMPRESSION:   1.  Small bowel obstruction evidenced by prominently dilated proximal  to mid small bowel loops. No free air or abscess. Trace pelvic free  fluid.    MIGUEL ANGEL VERA MD         SYSTEM ID:  [id number]   XR Abdomen Port 1 View    Narrative    ABDOMEN PORTABLE ONE VIEW   12/9/2022 3:45 PM     HISTORY: NG tube placement check    COMPARISON: CT abdomen and pelvis 12/9/2022.      Impression    IMPRESSION: Nasogastric tube within the stomach. Dilated small bowel  loops noted.    MIGUEL ANGEL VERA MD         SYSTEM ID:  [id number]

## 2022-12-10 NOTE — H&P
[facility]    History and Physical  Hospital Medicine       Date of Admission:  12/9/2022  Date of Service: 12/9/2022     Assessment & Plan   Eleazar Cortez is a 67 year old female who presents on 12/9/2022 with 3 days of progressive worsening nausea, vomiting, and oral intake intolerance.      Clinically Significant Risk Factors Present on Admission   Recent left TKA, use of pain medications and 325mg daily aspirin.    Small bowel obstruction  Nausea vomiting  Oral intake intolerance  No history of abdominal surgeries, she did have pelvic radiation in early 2000s for lymphoma.  -NG tube to suction for decompression  -Strict n.p.o. except for daily meds  -Bowel rest  -Zofran for nausea  -General surgery consulted.  -Pain control using IV medications PRN    #Postoperative left TKA  Left TKA by [facility], Dr Coleman on 12/5/2022.  Patient is ambulating with minimal assistance, does not complain of any significant pain.  -PT consulted for confirmation of postop progress  -IV medications for pain control PRN  -Continue home postsurgical dose of 325 mg of aspirin daily    #Hypothyroidism  -Continue home dose of 50 mg levothyroxine    #Osteoporosis  On home Boniva monthly, holding while inpatient    Mild anemia was noted with hemoglobin of 11.1, this is improved from 10.1 on 12/6/2022 and is to be expected 4 days post left TKA.                             Diet: NPO for Medical/Clinical Reasons Except for: Meds    DVT Prophylaxis: Low Risk/Ambulatory with no VTE prophylaxis indicated  Christianson Catheter: Not present  Code Status:   Full code  Lines: PIV    Disposition Plan Anticipate discharge home in 2 to 3 days     Expected Discharge Date: 12/11/2022             Entered: SINGH MCCLENDON CNP 12/09/2022, 8:03 PM       The patient's care was discussed with the Attending Physician, Dr. Sylvester, Patient and Patient's Family.  I have discussed patient and formulated plan with attending hospitalist  "physician, Dr. Hgiinio ESPARZA, APRN CNP        Primary Care Physician   Cheryl Serrano [phone]    History is obtained from the patient and review of old records via the EMR.    History of Present Illness   Eleazar Cortez is a 67 year old female with past medical history of left total knee arthroscopy on 12/5/2022 now presents on 12/9/2022 with 3 days of progressive food and fluid intolerance resulting in nausea and vomiting.  Mrs. Cortez states that she was passing gas on Tuesday prior to being discharged postsurgically, but has not in the last \"day or so\", nor has she passed a bowel movement.  She and her  state that on Tuesday she was able to tolerate some soup and experienced 1 episode of vomiting, on Wednesday she had improved intake with no episodes of vomiting, on Thursday she had at least 8 episodes of vomiting vomit was described as nonbloody, partially digested food.  She was not able to tolerate any liquids or solids on Thursday.  And today, Friday, 12/9/2022 she has again not been able to tolerate any intake and has vomited 3 times prior to arrival at the ED.  She did report that she was taking her oxycodone 5 mg every 4 hours for post surgical knee pain.  She has not taken any pain medications today and has no complaints of knee pain.  She stated that her abdominal pain was a 5-7/10 at its peak and it is \"much better now.\"    On arrival in the ED and NG tube was placed to low intermittent suction for decompression.  Eleazar reports significant relief as the stomach contents have been suctioned.      Review of Systems   CONSTITUTIONAL: NEGATIVE for fever, chills, change in weight  INTEGUMENTARY/SKIN: NEGATIVE for worrisome rashes, moles or lesions  ENT/MOUTH: NEGATIVE for ear, mouth and throat problems  RESP: NEGATIVE for significant cough or SOB  CV: NEGATIVE for chest pain, palpitations or peripheral edema  GI: NEGATIVE for nausea, abdominal pain, heartburn, or change in bowel " habits  MUSCULOSKELETAL: NEGATIVE for significant arthralgias or myalgia  NEURO: NEGATIVE for weakness, dizziness or paresthesias    Past Medical History    Mrs. Cortez's past medical history consists of non-Hodgkin's lymphoma treated with radiation currently in remission, hypothyroidism, osteoporosis, multiple episodes of basal cell carcinoma, idiopathic scoliosis, and childhood asthma.  Eleazar had her right knee replaced in 2015 without any complications.      Past Medical History:   Diagnosis Date     Arthritis 2014    in knee     BCC (basal cell carcinoma), face 2009    right lateral nose.  removed via Mohs     Hypothyroidism 10/11/2012     Osteoporosis      Other malignant lymphomas, unspecified site, extranodal and solid organ sites 2002    Non-Hodgkin's Lymphoma     Scoliosis (and kyphoscoliosis), idiopathic      Uncomplicated asthma childhood    Rarely... haven't noticed for a long time     Patient Active Problem List    Diagnosis Date Noted     SBO (small bowel obstruction) (H) 12/09/2022     Priority: Medium     Nausea and vomiting, unspecified vomiting type 12/09/2022     Priority: Medium     Osteoporosis without current pathological fracture, unspecified osteoporosis type 07/15/2022     Priority: Medium     Lymphoma in remission (H) 02/10/2021     Priority: Medium     Superficial basal cell carcinoma 01/15/2020     Priority: Medium     Shoulder pain, right 10/27/2016     Priority: Medium     Knee pain 03/26/2015     Priority: Medium     Other postprocedural status(V45.89) 03/26/2015     Priority: Medium     Tear of lateral cartilage or meniscus of knee, current 06/15/2014     Priority: Medium     Colon polyp, hyperplastic 10/11/2012     Priority: Medium     Environmental allergies 10/11/2012     Priority: Medium     Hypothyroidism 10/11/2012     Priority: Medium     CARDIOVASCULAR SCREENING; LDL GOAL LESS THAN 160 10/31/2010     Priority: Medium     Seborrheic Keratosis right leg 02/23/2009     Priority:  Medium             Basal Cell Carcinoma of right lower medial Eyelid 02/16/2009     Priority: Medium     Dr.J Rogers Mohs excision, four stages  04/30/09 normal Skin exam no signs of recurrent malignancy. Eyelid doing well       Atrophic vaginitis 11/22/2008     Priority: Medium     Benign neoplasm of colon 12/27/2006     Priority: Medium     12/27/06- colonoscopy revealed 2 hyperplastic polyps                  Plan repeat colonoscopy in 5 years       NONHODGKIN'S LYMPHOMA IN REMISSION 07/07/2006     Priority: Medium     Diagnosed in 2002: Stage I Non-Hodgkin's Lymphoma, Grade I, Follicular, Left Groin.  Fully resected, radiation.  No evidence of disease since.  6/28/2006 Dr Bri Kaur @ [facility]:  Last CT scan normal. CBC normal.  Plan: RTC in 6 months, with a CT prior.  12/18/08 @ [facility]:Ct 12/15/08 NL, CBC NL RTC in 2 years. No reacurrance of Dz       Disorder of bone and cartilage 12/12/2005     Priority: Medium     Osteopenia: Dexa in 12/2005 shows stable bone density which is moderately thin in hip area but normal in spine.   Would recommend she continue with fosamax  Problem list name updated by automated process. Provider to review          Past Surgical History     Past Surgical History:   Procedure Laterality Date     ARTHROPLASTY KNEE Left 12/5/2022    Procedure: LEFT TOTAL KNEE ARTHROPLASTY;  Surgeon: Daren Coleman MD;  [facility]: [facility] OR     BIOPSY  2002, 2011?    leg, nose     COLONOSCOPY  11/09/2012    Procedure: COLONOSCOPY;  Colonoscopy;  Surgeon: Drew Perdue MD;  Location: [facility]     ORTHOPEDIC SURGERY Left 2012, 2014    broke radius by wrist... plate and 9 screws put in, arthrosc     SURGICAL HISTORY OF -   12/2001    Lymph Node Excision on left leg     SURGICAL HISTORY OF -   01/2009    BCC removal right nasal area     ZZC TOTAL KNEE ARTHROPLASTY Right 03/2015        Prior to Admission Medications   Prior to Admission  Medications   Prescriptions Last Dose Informant Patient Reported? Taking?   Calcium Citrate-Vitamin D (CALCIUM + D PO) Past Week  Yes Yes   Sig: Take 1 tablet by mouth daily   IBANdronate (BONIVA) 150 MG tablet 11/30/2022  No Yes   Sig: Take 1 tablet (150 mg) by mouth every 30 days   UNABLE TO FIND 11/28/2022  Yes Yes   Sig: MEDICATION NAME: Melaleuca PM - Longevity  2 CardiOmegaEPA (Omega 3 Fishoils - 1000 mg EPG and 100 mg DHA)  2 Vitality Multivitamin and Mineral (Iron, Vitamin K, Folic acid, Biotin, Calcium)  2 CellWise Antioxidant (Olive extract, grape seed extract, Vitamin C, carotenoids)  2 ProveXCV (Blood pressure support)  2 RecoverAl (Inflammation)  1 Florify (acid resistance, probiotic)  -1 Vitality [place] (omega 3, Brain eye and heart)  -2 Acuity [place] (Brain health)  -1 K2-03 (Redirects calcium bone health)  -1Replenex Extra strength   -1 Nutraview (Vision- macular/lens health)  1 CoQ10 +Cellular Energy Support (antioxidants)   acetaminophen (TYLENOL) 500 MG tablet 12/8/2022 at 0100  Yes Yes   Sig: Take 500-1,000 mg by mouth every 6 hours as needed for mild pain   aspirin (ASA) 325 MG EC tablet 12/8/2022 at pm  No Yes   Sig: Take 1 tablet (325 mg) by mouth daily   ibuprofen (ADVIL/MOTRIN) 200 MG tablet 12/8/2022 at pm  Yes Yes   Sig: Take 600 mg by mouth every 8 hours as needed for pain   levothyroxine (SYNTHROID/LEVOTHROID) 50 MCG tablet 12/8/2022 at am  No Yes   Sig: Take 1 tablet (50 mcg) by mouth daily   methocarbamol (ROBAXIN) 500 MG tablet 12/6/2022 at pm  Yes Yes   Sig: Take 500 mg by mouth 4 times daily as needed for muscle spasms   oxyCODONE (ROXICODONE) 5 MG tablet 12/8/2022 at 2000  No Yes   Sig: Take 1 tablet (5 mg) by mouth every 4 hours as needed for moderate pain (4-6) Take 1 pill for moderate pain (4-6/10) and 2 pills for severe pain (7-10/10). Maximum 6 pills per day. Alternate with Tylenol.   senna-docusate (SENOKOT-S/PERICOLACE) 8.6-50 MG tablet 12/7/2022 at pm  No Yes   Sig: Take 1-2  "tablets by mouth 2 times daily Take while on oral narcotics to prevent or treat constipation.      Facility-Administered Medications: None     Allergies   Allergies   Allergen Reactions     Codeine Difficulty breathing     Also felt light headed.  After her arthroscopy 16-18 hour after surgery ( had been taking the Tylenol #3 ) did have an episode with lightheadedness and difficulty breathing/. Also felt like her neck has gotten stiff, couldn't move.  Lasting less than 5 min.   Has been able to take Tylenol since then without problems.       Dye [Contrast Dye] Other (See Comments)     congestion       Family History    Family History   Problem Relation Age of Onset     C.A.D. Father         heart problems at age 40     Arthritis Father         bilat knee replacements \"bowlegged\"     Coronary Artery Disease Father         heart attack 43 years ago     Asthma Father      Lipids Mother      Arthritis Mother         knees/fingers     Hypertension Mother      Other Cancer Mother         uterine cancer, 2016     Cardiovascular Paternal Grandfather      Coronary Artery Disease Paternal Grandfather          of heart attack     Neurologic Disorder Maternal Grandfather         park     Diabetes Maternal Aunt      Osteoporosis Paternal Grandmother      Arthritis Son         spondylolysis     Coronary Artery Disease Other         Congestive heart failure     Asthma Other         paternal aunt, adult onset     Asthma Other         paternal aunt       Social History   Social History     Socioeconomic History     Marital status:      Spouse name: Not on file     Number of children: Not on file     Years of education: Not on file     Highest education level: Not on file   Occupational History     Not on file   Tobacco Use     Smoking status: Never     Smokeless tobacco: Never   Substance and Sexual Activity     Alcohol use: No     Drug use: No     Sexual activity: Not Currently     Partners: Male     Birth " "control/protection: None   Other Topics Concern     Parent/sibling w/ CABG, MI or angioplasty before 65F 55M? Yes     Comment: Dad, 43 years ago but doing fine since   Social History Narrative     Not on file     Social Determinants of Health     Financial Resource Strain: Not on file   Food Insecurity: Not on file   Transportation Needs: Not on file   Physical Activity: Not on file   Stress: Not on file   Social Connections: Not on file   Intimate Partner Violence: Not on file   Housing Stability: Not on file       Physical Exam   /66 (BP Location: Left arm)   Pulse 98   Temp 99.1  F (37.3  C) (Oral)   Resp 16   Ht 1.651 m (5' 5\")   Wt 67.2 kg (148 lb 2.4 oz)   SpO2 95%   BMI 24.65 kg/m       Weight: 148 lbs 2.39 oz Body mass index is 24.65 kg/m .     Constitutional: Alert, oriented, cooperative, no apparent distress, appears nontoxic  Eyes: Eyes are clear, pupils are reactive.  HENT: Oropharynx is clear and moist. No evidence of cranial trauma.  Lymph/Hematologic: No epitrochlear, axillary, anterior or posterior cervical, or supraclavicular lymphadenopathy is appreciated.  Cardiovascular: Mild tachycardia with regular rhythm, normal S1 and S2, and no murmur noted. JVP is normal. Good peripheral pulses in wrists bilaterally. No lower extremity edema.  Respiratory: Clear to auscultation bilaterally.   GI: Soft, very little tenderness on palpation, normal bowel sounds, no hepatomegaly. NGT to intermittent suction, with thin green liquid contents being removed.  Genitourinary: Deferred  Musculoskeletal: Normal muscle bulk and tone.  Skin: Warm and dry, no rashes.   Neurologic: Neck supple. Cranial nerves are grossly intact.  is symmetric.     Data   Data reviewed today:   Recent Labs   Lab 12/09/22  1307 12/06/22  0826 12/05/22  1017   WBC 6.0  --  5.3   HGB 11.1* 10.1* 12.1   MCV 93  --  93     --  312     --   --    POTASSIUM 3.9  --  4.3   CHLORIDE 95*  --   --    CO2 32*  --   --  "   BUN 16.3  --   --    CR 0.86  --  0.84   ANIONGAP 10  --   --    ABE 9.2  --   --    * 95  --    ALBUMIN 4.0  --   --    PROTTOTAL 7.6  --   --    BILITOTAL 0.7  --   --    ALKPHOS 52  --   --    ALT 12  --   --    AST 28  --   --    LIPASE 17  --   --        Recent Results (from the past 24 hour(s))   CT Abdomen Pelvis w/o Contrast    Narrative    CT ABDOMEN AND PELVIS WITHOUT CONTRAST 12/9/2022 1:41 PM    CLINICAL HISTORY: Nausea, vomiting. Rule out obstruction.  TECHNIQUE: CT scan of the abdomen and pelvis was performed without IV  contrast. Multiplanar reformats were obtained. Dose reduction  techniques were used.  CONTRAST: None.    COMPARISON: None.    FINDINGS:   LOWER CHEST: Normal.    HEPATOBILIARY: Decompression of the gallbladder. No acute liver  abnormality.    PANCREAS: Normal.    SPLEEN: Normal.    ADRENAL GLANDS: Normal.    KIDNEYS/BLADDER: Normal.    BOWEL: Multiple dilated proximal to mid small bowel loops. Distal  small bowel loops are decompressed. There appears to be relative  gradual transition to decompression. No abscess or free air. Small  free pelvic fluid. Appendix not seen. No signs of appendicitis.    LYMPH NODES: Normal.    VASCULATURE: Scattered calcifications.     PELVIC ORGANS: Small free pelvic fluid.    OTHER: None.    MUSCULOSKELETAL: Mild spine degenerative change.      Impression    IMPRESSION:   1.  Small bowel obstruction evidenced by prominently dilated proximal  to mid small bowel loops. No free air or abscess. Trace pelvic free  fluid.    MIGUEL ANGEL VERA MD         SYSTEM ID:  [id number]   XR Abdomen Port 1 View    Narrative    ABDOMEN PORTABLE ONE VIEW   12/9/2022 3:45 PM     HISTORY: NG tube placement check    COMPARISON: CT abdomen and pelvis 12/9/2022.      Impression    IMPRESSION: Nasogastric tube within the stomach. Dilated small bowel  loops noted.    MIGUEL ANGEL VERA MD         SYSTEM ID:  [id number]       I personally reviewed the abdominal x-ray image(s) showing NG  tube is in the stomach and the abdominal CT image(s) showing Small bowel obstruction of proximal to mid small bowel loops

## 2022-12-10 NOTE — PLAN OF CARE
"    DATE & TIME: 12/9/2022 @ 2030     Diag: SBO        Cognitive Concerns/ Orientation : alert and orientated x 4  BEHAVIOR & AGGRESSION TOOL COLOR: green             ABNL VS/O2: room air, vitals stable  /64 (BP Location: Left arm)   Pulse 95   Temp 98.7  F (37.1  C) (Oral)   Resp 17   Ht 1.651 m (5' 5\")   Wt 67.2 kg (148 lb 2.4 oz)   SpO2 96%   BMI 24.65 kg/m    MOBILITY: assist of one with GB and walker  PAIN MANAGMENT: has some throat pain, given lozenges and ice pack for neck, patient swallows without hesitation.  DIET: NPO  BOWEL/BLADDER:voiding, NG present and patent with bile returns in tubing and cannister.  ABNL LAB/BG: see results review  DRAIN/DEVICES: none  TELEMETRY RHYTHM: regular  SKIN: wnl  Consults: PT, surgical consult  TESTS/PROCEDURES: n/a  D/C DATE: TBD  Discharge Barriers: At this time plan is for her to return home.    OTHER IMPORTANT INFO: Had LTKA on 12/5.                        "

## 2022-12-10 NOTE — PLAN OF CARE
"         DATE & TIME: 12/9/2022 @ 2030     Diag: SBO        Cognitive Concerns/ Orientation : alert and orientated x 4  BEHAVIOR & AGGRESSION TOOL COLOR: green             ABNL VS/O2: room air, vitals stable  /64 (BP Location: Left arm)   Pulse 95   Temp 98.7  F (37.1  C) (Oral)   Resp 17   Ht 1.651 m (5' 5\")   Wt 67.2 kg (148 lb 2.4 oz)   SpO2 96%   BMI 24.65 kg/m    MOBILITY: assist of one with GB and walker  PAIN MANAGMENT: has some throat pain, given lozenges and ice pack for neck, patient swallows without difficulty  DIET: NPO except for po medication.  BOWEL/BLADDER: voiding, NG present with bile returns in tubing and cannister.  ABNL LAB/BG: see results review  DRAIN/DEVICES: none  TELEMETRY RHYTHM: regular  SKIN: wnl  Consults: PT, surgical consult  TESTS/PROCEDURES: n/a  D/C DATE: TBD  Discharge Barriers: At this time plan is for her to return home.    OTHER IMPORTANT INFO: Had LTKA on 12/5 at [facility].                  "

## 2022-12-10 NOTE — PROGRESS NOTES
Patient walked approximately 150 in hallway and tolerated this well. She stated that it felt good to walk.

## 2022-12-10 NOTE — PROGRESS NOTES
"[facility] [facility] [place]    Patient admitted to room 2304 at approximately 1720 via ambulation from emergency room. Patient was accompanied by spouse.     Verbal SBAR report received from [facility];  prior to patient arrival.     Patient ambulated to bed with one assist. Patient alert and oriented X 3. The patient is not having any pain.  . Admission vital signs: Blood pressure (!) 140/69, pulse 104, temperature 99.1  F (37.3  C), temperature source Oral, resp. rate 17, height 1.651 m (5' 5\"), weight 67.2 kg (148 lb 2.4 oz), SpO2 94 %, not currently breastfeeding. Patient was oriented to plan of care, call light, bed controls, tv, telephone, bathroom and visiting hours.     Risk Assessment    The following safety risks were identified during admission: none. Yellow risk band applied: NO.     Skin Initial Assessment    This writer admitted this patient and completed a full skin assessment and Scott score in the Adult PCS flowsheet. Appropriate interventions initiated as needed.     Secondary skin check completed by pineda Chavez Risk Assessment  Sensory Perception: 4-->no impairment  Moisture: 4-->rarely moist  Activity: 3-->walks occasionally  Mobility: 4-->no limitation  Nutrition: 3-->adequate  Friction and Shear: 3-->no apparent problem  Scott Score: 21  Mattress: Standard gel/foam mattress (IsoFlex, Atmos Air, etc.)  Bed Frame: Standard width and length    Education    Patient has a [place] to Observation order: No  Observation education completed and documented: N/A      Cassy De Jesus RN    "

## 2022-12-10 NOTE — PROGRESS NOTES
Per Dr Jerry ROBLERO removed, IV saline locked and patient drinking water and eating jello, waiting for regular supper tray to come.    1830 Tolerated regular supper. Denies nausea, pain, having runny stools.

## 2022-12-10 NOTE — PLAN OF CARE
DATE & TIME: 12/9/2022 @ 2030        Diag: SBO     Cognitive Concerns/ Orientation : alert and orientated x 4  BEHAVIOR & AGGRESSION TOOL COLOR: green             ABNL VS/O2: room air, vitals stable  MOBILITY: SBA walker  PAIN MANAGMENT: denies pain at this time  DIET: as tolerated, good appetite and feeds herself  BOWEL/BLADDER: wnl  ABNL LAB/BG: see results review  DRAIN/DEVICES: none  TELEMETRY RHYTHM: regular  SKIN: wnl  Consults: PT, surgical consult  TESTS/PROCEDURES: n/a  D/C DATE: TBD  Discharge Barriers: At this time plan is for her to return home.    OTHER IMPORTANT INFO: Had LTKA on 12/5.

## 2022-12-10 NOTE — PROGRESS NOTES
"Subjective:   Seen and examined.  No acute events overnight.  Patient had small BM after GG administration and feels \"something moving\" in her belly.        No intake/output data recorded.     No current outpatient medications on file.         ROUTINE IP LABS (Last four results)  BMP  Recent Labs   Lab 12/09/22  1307 12/06/22  0826 12/05/22  1017     --   --    POTASSIUM 3.9  --  4.3   CHLORIDE 95*  --   --    BAE 9.2  --   --    CO2 32*  --   --    BUN 16.3  --   --    CR 0.86  --  0.84   * 95  --      CBC  Recent Labs   Lab 12/09/22  1307 12/06/22  0826 12/05/22  1017   WBC 6.0  --  5.3   RBC 3.63*  --  3.96   HGB 11.1*   < > 12.1   HCT 33.8*  --  36.8   MCV 93  --  93   MCH 30.6  --  30.6   MCHC 32.8  --  32.9   RDW 13.3  --  13.1     --  312    < > = values in this interval not displayed.     INRNo lab results found in last 7 days.         Tmax: 99.1  --->  Tcurrent: 97.8   B/P: 137/63  P: 96  R: 17  SpO2:94% RA        EXAM  AOX4 NAD  Soft, non-tender.  No distension.  No R/R/G  No CCE        A/P:   1. pSBO vs Ileus  - Gastrograffin challenge pending, admin at 0930.  - Continue clamp of NG  - Ambulate  - Await ROBF  - Will follow-up study    Bimal Edwards DO on 12/10/2022 at 12:40 PM    "

## 2022-12-10 NOTE — PROGRESS NOTES
Patient tolerated gastrografin administered via NG. Has since had a small bowel movement,passing gas, denies nausea, abd pain, audible BS.  Up to walk about 200 feet in [place] with SBA/pushing IV pole. Gait steady. Minimal pain left knee, ice to left knee.

## 2022-12-10 NOTE — PROGRESS NOTES
Patient having multiple water stools/small formed stools since gastrografin challenge. Dr Torgerson called writer and would like the abdominal x ray done now. [facility] contacted and patient will be brought down when they can take her. Patient informed of plan.

## 2022-12-10 NOTE — PROGRESS NOTES
12/10/22 0800   Appointment Info   Signing Clinician's Name / Credentials (PT) Dianna Ferrara, PT, DPT   Living Environment   People in Home spouse   Current Living Arrangements house   Living Environment Comments Pt lives at home with . Was at home following TKA on monday and doing well mobility wise   Self-Care   Usual Activity Tolerance good   Current Activity Tolerance good   Equipment Currently Used at Home cane, straight   Fall history within last six months no   Activity/Exercise/Self-Care Comment Pt ind with all mobility, gait and balance prior to surgery. Uses SC PRN s/p TKA   General Information   Onset of Illness/Injury or Date of Surgery 12/09/22   Referring Physician Tyshawn Mendoza MD   Patient/Family Therapy Goals Statement (PT) return home and keep progressing with mobility   Pertinent History of Current Problem (include personal factors and/or comorbidities that impact the POC) Eleazar Cortez is a 67 year old female who has past medical history significant for left knee replacement that was performed on Monday.  Patient states that the procedure went well.  She has otherwise been ambulatory since the surgery.  However, patient has had issues keeping food, in addition to liquids down, especially over the past 1 day   General Observations Pt alert and agreeable to PT.   Cognition   Affect/Mental Status (Cognition) WNL   Pain Assessment   Patient Currently in Pain No   Integumentary/Edema   Integumentary/Edema Comments bandage over left knee incision   Range of Motion (ROM)   Range of Motion ROM is WFL   ROM Comment L knee AROM: 0-110 deg   Strength (Manual Muscle Testing)   Strength (Manual Muscle Testing) strength is WFL   Bed Mobility   Bed Mobility no deficits identified   Comment, (Bed Mobility) Pt independent with all bed mobility   Transfers   Transfers sit-stand transfer   Impairments Contributing to Impaired Transfers pain;decreased ROM;decreased strength   Comment, (Transfers) Pt  requires FWW for sit to stand   Sit-Stand Transfer   Sit-Stand [place] (Transfers) modified independence;supervision   Assistive Device (Sit-Stand Transfers) walker, front-wheeled   Comment, (Sit-Stand Transfer) gait belt and SBA   Balance   Balance no deficits were identified   Sensory Examination   Sensory Perception patient reports no sensory changes   Coordination   Coordination no deficits were identified   Clinical Impression   Criteria for Skilled Therapeutic Intervention Evaluation only;Yes, treatment indicated   PT Diagnosis (PT) s/p R TKA   Functional limitations due to impairments gait, balance, transfers   Clinical Presentation (PT Evaluation Complexity) Stable/Uncomplicated   Clinical Presentation Rationale clinical reasoning   Clinical Decision Making (Complexity) low complexity   Planned Therapy Interventions (PT) balance training;bed mobility training;gait training;patient/family education;ROM (range of motion);strengthening;stair training;transfer training;risk factor education;home program guidelines   Risk & Benefits of therapy have been explained evaluation/treatment results reviewed;care plan/treatment goals reviewed;risks/benefits reviewed;current/potential barriers reviewed;participants voiced agreement with care plan;participants included;patient   Clinical Impression Comments Pt currently being hospitalized related to GI issues, however is 6 days s/p R TKA. She is moving well and demonstrates good L knee ROM. If she remains in hospital, will benefit from PT to progress knee rehab.   PT Total Evaluation Time   PT Eval, Low Complexity Minutes (47039) 5   Plan of Care Review   Plan of Care Reviewed With patient   Physical Therapy Goals   PT Frequency 3x/week   PT Predicted Duration/Target Date for Goal Attainment 12/13/22   PT Goals Stairs;Gait   PT: Gait Independent;Supervision/stand-by assist;100 feet   PT: Stairs Modified independent;4 stairs   Interventions   Interventions Quick Adds  Therapeutic Procedure   Therapeutic Procedure/Exercise   Ther. Procedure: strength, endurance, ROM, flexibillity Minutes (05084) 18   Symptoms Noted During/After Treatment increased pain   Treatment Detail/Skilled Intervention sit to stand x10, standing marches with FWW x10, heel raises x10, heel slides with gait belt x30, SLR x10   PT Discharge Planning   PT Discharge Recommendation (DC Rec) home with outpatient physical therapy   PT Rationale for DC Rec Pt moving well and was independent at baseline   Total Session Time   Timed Code Treatment Minutes 18   Total Session Time (sum of timed and untimed services) 23

## 2022-12-11 ENCOUNTER — APPOINTMENT (OUTPATIENT)
Dept: GENERAL RADIOLOGY | Facility: CLINIC | Age: 67
DRG: 390 | End: 2022-12-11
Attending: SURGERY
Payer: COMMERCIAL

## 2022-12-11 VITALS — RESPIRATION RATE: 16 BRPM | SYSTOLIC BLOOD PRESSURE: 131 MMHG | DIASTOLIC BLOOD PRESSURE: 63 MMHG | HEART RATE: 78 BPM

## 2022-12-11 LAB
ANION GAP SERPL CALCULATED.3IONS-SCNC: 9 MMOL/L (ref 7–15)
BUN SERPL-MCNC: 14.2 MG/DL (ref 8–23)
CALCIUM SERPL-MCNC: 7.8 MG/DL (ref 8.8–10.2)
CHLORIDE SERPL-SCNC: 105 MMOL/L (ref 98–107)
CREAT SERPL-MCNC: 0.64 MG/DL (ref 0.51–0.95)
DEPRECATED HCO3 PLAS-SCNC: 25 MMOL/L (ref 22–29)
GFR SERPL CREATININE-BSD FRML MDRD: >90 ML/MIN/1.73M2
GLUCOSE SERPL-MCNC: 103 MG/DL (ref 70–99)
HOLD SPECIMEN: NORMAL
POTASSIUM SERPL-SCNC: 3.6 MMOL/L (ref 3.4–5.3)
SODIUM SERPL-SCNC: 139 MMOL/L (ref 136–145)

## 2022-12-11 PROCEDURE — 250N000011 HC RX IP 250 OP 636: Performed by: INTERNAL MEDICINE

## 2022-12-11 PROCEDURE — 80048 BASIC METABOLIC PNL TOTAL CA: CPT | Performed by: INTERNAL MEDICINE

## 2022-12-11 PROCEDURE — 120N000001 HC R&B MED SURG/OB

## 2022-12-11 PROCEDURE — 250N000013 HC RX MED GY IP 250 OP 250 PS 637: Performed by: INTERNAL MEDICINE

## 2022-12-11 PROCEDURE — 99239 HOSP IP/OBS DSCHRG MGMT >30: CPT | Performed by: INTERNAL MEDICINE

## 2022-12-11 PROCEDURE — 36415 COLL VENOUS BLD VENIPUNCTURE: CPT | Performed by: INTERNAL MEDICINE

## 2022-12-11 PROCEDURE — 250N000013 HC RX MED GY IP 250 OP 250 PS 637: Performed by: NURSE PRACTITIONER

## 2022-12-11 PROCEDURE — 258N000003 HC RX IP 258 OP 636: Performed by: SURGERY

## 2022-12-11 PROCEDURE — 74019 RADEX ABDOMEN 2 VIEWS: CPT

## 2022-12-11 PROCEDURE — 250N000011 HC RX IP 250 OP 636: Performed by: NURSE PRACTITIONER

## 2022-12-11 RX ORDER — ACETAMINOPHEN 325 MG/1
650 TABLET ORAL EVERY 4 HOURS PRN
Status: DISCONTINUED | OUTPATIENT
Start: 2022-12-11 | End: 2022-12-11

## 2022-12-11 RX ORDER — ACETAMINOPHEN 325 MG/1
650 TABLET ORAL EVERY 4 HOURS PRN
Status: DISCONTINUED | OUTPATIENT
Start: 2022-12-11 | End: 2022-12-12 | Stop reason: HOSPADM

## 2022-12-11 RX ORDER — MAGNESIUM HYDROXIDE/ALUMINUM HYDROXICE/SIMETHICONE 120; 1200; 1200 MG/30ML; MG/30ML; MG/30ML
30 SUSPENSION ORAL EVERY 4 HOURS PRN
Status: DISCONTINUED | OUTPATIENT
Start: 2022-12-11 | End: 2022-12-12 | Stop reason: HOSPADM

## 2022-12-11 RX ORDER — ACETAMINOPHEN 325 MG/1
650 TABLET ORAL ONCE
Status: COMPLETED | OUTPATIENT
Start: 2022-12-11 | End: 2022-12-11

## 2022-12-11 RX ORDER — SODIUM CHLORIDE, SODIUM LACTATE, POTASSIUM CHLORIDE, CALCIUM CHLORIDE 600; 310; 30; 20 MG/100ML; MG/100ML; MG/100ML; MG/100ML
INJECTION, SOLUTION INTRAVENOUS CONTINUOUS
Status: DISCONTINUED | OUTPATIENT
Start: 2022-12-11 | End: 2022-12-12

## 2022-12-11 RX ADMIN — SODIUM CHLORIDE, POTASSIUM CHLORIDE, SODIUM LACTATE AND CALCIUM CHLORIDE: 600; 310; 30; 20 INJECTION, SOLUTION INTRAVENOUS at 14:12

## 2022-12-11 RX ADMIN — LEVOTHYROXINE SODIUM 50 MCG: 0.05 TABLET ORAL at 08:03

## 2022-12-11 RX ADMIN — ACETAMINOPHEN 650 MG: 325 TABLET, FILM COATED ORAL at 02:14

## 2022-12-11 RX ADMIN — ALUMINUM HYDROXIDE, MAGNESIUM HYDROXIDE, AND SIMETHICONE 30 ML: 200; 200; 20 SUSPENSION ORAL at 12:14

## 2022-12-11 RX ADMIN — ONDANSETRON 4 MG: 2 INJECTION INTRAMUSCULAR; INTRAVENOUS at 14:06

## 2022-12-11 RX ADMIN — ACETAMINOPHEN 650 MG: 325 TABLET, FILM COATED ORAL at 08:47

## 2022-12-11 RX ADMIN — ALUMINUM HYDROXIDE, MAGNESIUM HYDROXIDE, AND SIMETHICONE 30 ML: 200; 200; 20 SUSPENSION ORAL at 20:17

## 2022-12-11 RX ADMIN — ENOXAPARIN SODIUM 40 MG: 40 INJECTION SUBCUTANEOUS at 15:06

## 2022-12-11 RX ADMIN — ACETAMINOPHEN 650 MG: 325 TABLET, FILM COATED ORAL at 14:23

## 2022-12-11 RX ADMIN — ACETAMINOPHEN 650 MG: 325 TABLET, FILM COATED ORAL at 18:13

## 2022-12-11 ASSESSMENT — ACTIVITIES OF DAILY LIVING (ADL)
ADLS_ACUITY_SCORE: 23
ADLS_ACUITY_SCORE: 22
ADLS_ACUITY_SCORE: 22
ADLS_ACUITY_SCORE: 23
ADLS_ACUITY_SCORE: 23
ADLS_ACUITY_SCORE: 22
ADLS_ACUITY_SCORE: 23
ADLS_ACUITY_SCORE: 22
ADLS_ACUITY_SCORE: 22
ADLS_ACUITY_SCORE: 23

## 2022-12-11 NOTE — PLAN OF CARE
"Diag: SBO        Cognitive Concerns/ Orientation : alert and orientated x 4  BEHAVIOR & AGGRESSION TOOL COLOR: green             ABNL VS/O2: room air, vitals stable  BP (!) 141/68 (BP Location: Left arm)   Pulse 92   Temp 98  F (36.7  C) (Oral)   Resp (!) 92   Ht 1.651 m (5' 5\")   Wt 67.2 kg (148 lb 2.4 oz)   SpO2 95%   BMI 24.65 kg/m     MOBILITY: assist of one with GB and walker  PAIN MANAGMENT: Using cooled gel packs to left knee  DIET: SONYA  BOWEL/BLADDER: voiding wnl, having very small loose stools, hardly any stool is visible in toilet when patient calls and states that she has had a stool. Encouraged her to continue to drink fluids and hydrate.  ABNL LAB/BG: see results review  DRAIN/DEVICES: none  Heart  RHYTHM: regular  SKIN: wnl  Consults: PT, surgical consult  TESTS/PROCEDURES: n/a  D/C DATE: Sunday  Discharge Barriers: none  OTHER IMPORTANT INFO: Had LTKA on 12/5 at [facility].                 "

## 2022-12-11 NOTE — PROGRESS NOTES
Subjective:   Seen and examined.  Patient had multiple BMs overnight and overall felt better.  Today she feels more bloated.  No fevers or chills.  Minimal to no pain.  She had a BM this morning.  Had regular food this AM.      I/O last 3 completed shifts:  In: 1525 [P.O.:700; I.V.:825]  Out: -      No current outpatient medications on file.         ROUTINE IP LABS (Last four results)  BMP  Recent Labs   Lab 12/11/22  0536 12/09/22  1307 12/06/22  0826 12/05/22  1017    137  --   --    POTASSIUM 3.6 3.9  --  4.3   CHLORIDE 105 95*  --   --    ABE 7.8* 9.2  --   --    CO2 25 32*  --   --    BUN 14.2 16.3  --   --    CR 0.64 0.86  --  0.84   * 125* 95  --      CBC  Recent Labs   Lab 12/09/22  1307 12/06/22  0826 12/05/22  1017   WBC 6.0  --  5.3   RBC 3.63*  --  3.96   HGB 11.1*   < > 12.1   HCT 33.8*  --  36.8   MCV 93  --  93   MCH 30.6  --  30.6   MCHC 32.8  --  32.9   RDW 13.3  --  13.1     --  312    < > = values in this interval not displayed.     INRNo lab results found in last 7 days.         Tmax: 98.2  --->  Tcurrent: 98.2   B/P: 131/63  P: 79  R: 16  SpO2:96% RA        EXAM  AOX4 NAD  Moderate distension, non-tender.  No R/R/G  No CCE        A/P:   1. pSBO  - Overall doing better.    - Check KUB  - May require operation yet  - OOB, Ambulate as much as possible.    Bimal Edwards, DO on 12/11/2022 at 12:37 PM

## 2022-12-11 NOTE — PROVIDER NOTIFICATION
Patient states that she is having pain in her left knee and would like some tylenol for this. Md notified and orders were received and medication was given. Cool gel pack applied to knee also. Given warm blanket and has some crackers at bedside. Patient states that she has been passing a lot of gas, told her that this is helpful with recovering from SBO. Will continue to monitor.

## 2022-12-11 NOTE — PLAN OF CARE
"  Diag: SBO        Cognitive Concerns/ Orientation : alert and orientated x 4  BEHAVIOR & AGGRESSION TOOL COLOR: green             ABNL VS/O2: room air, vitals stable  BP (!) 149/81 (BP Location: Left arm)   Pulse 97   Temp 97.9  F (36.6  C) (Oral)   Resp 16   Ht 1.651 m (5' 5\")   Wt 67.2 kg (148 lb 2.4 oz)   SpO2 97%   BMI 24.65 kg/m    MOBILITY: assist of one with GB and walker  PAIN MANAGMENT: Using cooled gel packs to left knee  DIET: SONYA  BOWEL/BLADDER: has been having some loose stools since getting gastrografin earlier today  ABNL LAB/BG: see results review  DRAIN/DEVICES: none  Heart  RHYTHM: regular  SKIN: wnl  Consults: PT, surgical consult  TESTS/PROCEDURES: n/a  D/C DATE: Sunday  Discharge Barriers: none  OTHER IMPORTANT INFO: Had LTKA on 12/5 at [facility].                          "

## 2022-12-11 NOTE — PROGRESS NOTES
"Patient turned call light on and reported that she feels \"bloated\". Writer assessed patient abdomen and it is more pronounced than when writer assessed this morning. She did have a loose bowel movement after breakfast. Informed her that writer will update Dr Acevedo, to hold on regular food and stick with clear liquids until MD has assessed and to walk.  "

## 2022-12-11 NOTE — DISCHARGE SUMMARY
[facility]  Hospitalist Discharge Summary      Date of Admission:  12/9/2022  Date of Discharge:  12/11/2022  Discharging Provider: Curtis Villavicencio MD  Discharge Service: Hospitalist Service    Discharge Diagnoses   Small bowel Obstruction  S/P Left TKA  Hypothyroidism     Follow-ups Needed After Discharge   Follow-up Appointments     Follow-up and recommended labs and tests       Follow up with primary care provider, Cheryl Serrano, within 7 days   for hospital follow- up.  No follow up labs or test are needed.           Discharge Disposition   Discharged to home  Condition at discharge: Stable      Hospital Course     Eleazar Cortez is a 67 year old female who was admitted on 12/9/2022 with a small bowel obstruction. She underwent left TKA on 12/5.  She was admitted to the hospital for management of acute SBO     Small bowel obstruction  CT scan abdomen pelvis without contrast demonstrates small bowel obstruction with proximally dilated proximal to mid small bowel loops.  No free air or abscess.  Trace pelvic free fluid.   Patients was made NPO, given IV bolus fluids and  NG tube placed in the ED   -Appreciate general surgery consult  -Gastrografin study with good results   - With the above regimen, symptoms resolved and patient has started having bowel  -Has been able to tolerate diet well  In the absence of other causes, likely secondary or perhaps contributed by opioid induced bowel malfunction.     Hypothyroidism  Continue levothyroxine.     Asthma  No evidence of acute exacerbation     Consultations This Hospital Stay   PHYSICAL THERAPY ADULT IP CONSULT  SURGERY GENERAL IP CONSULT    Code Status   Full Code    Time Spent on this Encounter   I, Curtis Villavicencio MD, personally saw the patient today and spent greater than 30 minutes discharging this patient.       Curtis Villavicencio MD  [facility] MEDICAL SURGICAL  [address]  [place] [address]  Phone: [phone]  Fax: [phone]  ______________________________________________________________________    Physical Exam   Vital Signs: Temp: 98.2  F (36.8  C) Temp src: Oral BP: 131/63 Pulse: 79   Resp: 16 SpO2: 96 % O2 Device: None (Room air)    Weight: 148 lbs 2.39 oz  General Appearance: Awake, alert and not in distress  Respiratory: Clear breath sounds bilaterally   Cardiovascular: Normal heart sounds. No murmurs   GI: Soft, non tender. Normal bowel sounds   Skin: No bruising or bleeding   Other:Awake, alert and orientated X 3          Primary Care Physician   Cheryl Serrano    Discharge Orders      Reason for your hospital stay    Small bowel obstruction     Follow-up and recommended labs and tests     Follow up with primary care provider, Cheryl Serrano, within 7 days for hospital follow- up.  No follow up labs or test are needed.     Activity    Your activity upon discharge: activity as tolerated     Diet    Follow this diet upon discharge: Orders Placed This Encounter      Regular Diet Adult       Significant Results and Procedures   Results for orders placed or performed during the hospital encounter of 12/09/22   CT Abdomen Pelvis w/o Contrast    Narrative    CT ABDOMEN AND PELVIS WITHOUT CONTRAST 12/9/2022 1:41 PM    CLINICAL HISTORY: Nausea, vomiting. Rule out obstruction.  TECHNIQUE: CT scan of the abdomen and pelvis was performed without IV  contrast. Multiplanar reformats were obtained. Dose reduction  techniques were used.  CONTRAST: None.    COMPARISON: None.    FINDINGS:   LOWER CHEST: Normal.    HEPATOBILIARY: Decompression of the gallbladder. No acute liver  abnormality.    PANCREAS: Normal.    SPLEEN: Normal.    ADRENAL GLANDS: Normal.    KIDNEYS/BLADDER: Normal.    BOWEL: Multiple dilated proximal to mid small bowel loops. Distal  small bowel loops are decompressed. There appears to be relative  gradual transition to decompression. No abscess or free  air. Small  free pelvic fluid. Appendix not seen. No signs of appendicitis.    LYMPH NODES: Normal.    VASCULATURE: Scattered calcifications.     PELVIC ORGANS: Small free pelvic fluid.    OTHER: None.    MUSCULOSKELETAL: Mild spine degenerative change.      Impression    IMPRESSION:   1.  Small bowel obstruction evidenced by prominently dilated proximal  to mid small bowel loops. No free air or abscess. Trace pelvic free  fluid.    MIGUEL ANGEL VERA MD         SYSTEM ID:  [id number]   XR Abdomen Port 1 View    Narrative    ABDOMEN PORTABLE ONE VIEW   12/9/2022 3:45 PM     HISTORY: NG tube placement check    COMPARISON: CT abdomen and pelvis 12/9/2022.      Impression    IMPRESSION: Nasogastric tube within the stomach. Dilated small bowel  loops noted.    MIGUEL ANGEL VERA MD         SYSTEM ID:  [id number]   XR Gastrografin Challenge    Narrative    EXAM: XR GASTROGRAFIN CHALLENGE  LOCATION: [facility]  DATE/TIME: 12/10/2022 4:15 PM    INDICATION: Small Bowel Obstruction  COMPARISON: CT 12/09/2022  TECHNIQUE: Routine water soluble contrast follow-through challenge.    FINDINGS:  FLUOROSCOPIC TIME: 0 minutes  NUMBER OF FLUOROSCOPIC IMAGES: 0    One x-ray image    Enteric suction tube with the tip and side-port in the gastric lumen. Interval passage of oral contrast throughout the colon and into the rectum. Mild small bowel dilatation, similar to slightly improved compared to the recent CT.      Impression    IMPRESSION: Persistent mild small bowel dilatation and interval passage of oral contrast into the colon and rectum. Findings suggest a partial small bowel obstruction       Discharge Medications   Current Discharge Medication List      CONTINUE these medications which have NOT CHANGED    Details   acetaminophen (TYLENOL) 500 MG tablet Take 500-1,000 mg by mouth every 6 hours as needed for mild pain      aspirin (ASA) 325 MG EC tablet Take 1 tablet (325 mg) by mouth daily  Qty: 42 tablet, Refills: 0     Associated Diagnoses: History of total knee replacement, left      Calcium Citrate-Vitamin D (CALCIUM + D PO) Take 1 tablet by mouth daily      IBANdronate (BONIVA) 150 MG tablet Take 1 tablet (150 mg) by mouth every 30 days  Qty: 3 tablet, Refills: 3    Associated Diagnoses: Osteoporosis without current pathological fracture, unspecified osteoporosis type      ibuprofen (ADVIL/MOTRIN) 200 MG tablet Take 600 mg by mouth every 8 hours as needed for pain      levothyroxine (SYNTHROID/LEVOTHROID) 50 MCG tablet Take 1 tablet (50 mcg) by mouth daily  Qty: 90 tablet, Refills: 3    Associated Diagnoses: Hypothyroidism, unspecified type      methocarbamol (ROBAXIN) 500 MG tablet Take 500 mg by mouth 4 times daily as needed for muscle spasms      senna-docusate (SENOKOT-S/PERICOLACE) 8.6-50 MG tablet Take 1-2 tablets by mouth 2 times daily Take while on oral narcotics to prevent or treat constipation.  Qty: 30 tablet, Refills: 0    Comments: While taking narcotics  Associated Diagnoses: History of total knee replacement, left      UNABLE TO FIND MEDICATION NAME: Melaleuca PM - Longevity  2 CardiOmegaEPA (Omega 3 Fishoils - 1000 mg EPG and 100 mg DHA)  2 Vitality Multivitamin and Mineral (Iron, Vitamin K, Folic acid, Biotin, Calcium)  2 CellWise Antioxidant (Olive extract, grape seed extract, Vitamin C, carotenoids)  2 ProveXCV (Blood pressure support)  2 RecoverAl (Inflammation)  1 Florify (acid resistance, probiotic)  -1 Vitality [place] (omega 3, Brain eye and heart)  -2 Acuity [place] (Brain health)  -1 K2-03 (Redirects calcium bone health)  -1Replenex Extra strength   -1 Nutraview (Vision- macular/lens health)  1 CoQ10 +Cellular Energy Support (antioxidants)         STOP taking these medications       oxyCODONE (ROXICODONE) 5 MG tablet Comments:   Reason for Stopping:             Allergies   Allergies   Allergen Reactions     Codeine Difficulty breathing     Also felt light headed.  After her arthroscopy 16-18 hour after  surgery ( had been taking the Tylenol #3 ) did have an episode with lightheadedness and difficulty breathing/. Also felt like her neck has gotten stiff, couldn't move.  Lasting less than 5 min.   Has been able to take Tylenol since then without problems.       Dye [Contrast Dye] Other (See Comments)     congestion

## 2022-12-11 NOTE — PLAN OF CARE
Patient up to walk hallway with  this afternoon. Passing a lot of gas following walk. Felt a little queasy after drinking the sergio juice at lunch, zofran IV administered. Rates pain knee 2/10 and some mild discomfort in abdomen, tylenol 650 mg given at 1423. Ice to left knee. Up independently with use of walker, will call if she needs assistance. Informed of order for clear liquids up until midnight tonight and then nothing to eat or drink after midnight.       1440        Enoxaprin was not given this morning as patient was going to discharge home and take her daily ASA this evening as prescribed to her following left total knee. Dr Torgeson called and informed him that she did not get her enoxaprin and should she get it if he plans for surgery, he said it was ok to give the enoxaprin.

## 2022-12-12 ENCOUNTER — APPOINTMENT (OUTPATIENT)
Dept: GENERAL RADIOLOGY | Facility: CLINIC | Age: 67
DRG: 390 | End: 2022-12-12
Attending: SURGERY
Payer: COMMERCIAL

## 2022-12-12 VITALS
HEIGHT: 65 IN | BODY MASS INDEX: 24.68 KG/M2 | SYSTOLIC BLOOD PRESSURE: 131 MMHG | WEIGHT: 148.15 LBS | DIASTOLIC BLOOD PRESSURE: 66 MMHG | OXYGEN SATURATION: 93 % | TEMPERATURE: 98.2 F | HEART RATE: 100 BPM | RESPIRATION RATE: 16 BRPM

## 2022-12-12 PROCEDURE — 250N000011 HC RX IP 250 OP 636: Performed by: INTERNAL MEDICINE

## 2022-12-12 PROCEDURE — 258N000003 HC RX IP 258 OP 636: Performed by: SURGERY

## 2022-12-12 PROCEDURE — 250N000013 HC RX MED GY IP 250 OP 250 PS 637: Performed by: NURSE PRACTITIONER

## 2022-12-12 PROCEDURE — 74019 RADEX ABDOMEN 2 VIEWS: CPT

## 2022-12-12 PROCEDURE — 250N000013 HC RX MED GY IP 250 OP 250 PS 637: Performed by: INTERNAL MEDICINE

## 2022-12-12 RX ADMIN — LEVOTHYROXINE SODIUM 50 MCG: 0.05 TABLET ORAL at 07:48

## 2022-12-12 RX ADMIN — ENOXAPARIN SODIUM 40 MG: 40 INJECTION SUBCUTANEOUS at 10:02

## 2022-12-12 RX ADMIN — ACETAMINOPHEN 650 MG: 325 TABLET, FILM COATED ORAL at 07:48

## 2022-12-12 RX ADMIN — SODIUM CHLORIDE, POTASSIUM CHLORIDE, SODIUM LACTATE AND CALCIUM CHLORIDE: 600; 310; 30; 20 INJECTION, SOLUTION INTRAVENOUS at 10:02

## 2022-12-12 RX ADMIN — ACETAMINOPHEN 650 MG: 325 TABLET, FILM COATED ORAL at 13:12

## 2022-12-12 ASSESSMENT — ACTIVITIES OF DAILY LIVING (ADL)
ADLS_ACUITY_SCORE: 23
ADLS_ACUITY_SCORE: 25
ADLS_ACUITY_SCORE: 23
ADLS_ACUITY_SCORE: 25
ADLS_ACUITY_SCORE: 23
ADLS_ACUITY_SCORE: 22
ADLS_ACUITY_SCORE: 23
ADLS_ACUITY_SCORE: 25
ADLS_ACUITY_SCORE: 22

## 2022-12-12 NOTE — PROGRESS NOTES
[facility] [facility] DISCHARGE NOTE    Patient discharged to home at 5:10 PM via wheel chair. Accompanied by spouse and staff. Discharge instructions reviewed with patient, opportunity offered to ask questions. Prescriptions - None ordered for discharge. All belongings sent with patient.    Chance Pavon RN

## 2022-12-12 NOTE — PROGRESS NOTES
Subjective:   Seen and examined.  No acute events overnight.  Patient states she feels improved.  No nausea or vomiting.  3x BM overnight.      I/O last 3 completed shifts:  In: 900 [P.O.:900]  Out: -      No current outpatient medications on file.         ROUTINE IP LABS (Last four results)  BMP  Recent Labs   Lab 12/11/22  0536 12/09/22  1307 12/06/22  0826    137  --    POTASSIUM 3.6 3.9  --    CHLORIDE 105 95*  --    ABE 7.8* 9.2  --    CO2 25 32*  --    BUN 14.2 16.3  --    CR 0.64 0.86  --    * 125* 95     CBC  Recent Labs   Lab 12/09/22  1307   WBC 6.0   RBC 3.63*   HGB 11.1*   HCT 33.8*   MCV 93   MCH 30.6   MCHC 32.8   RDW 13.3        INRNo lab results found in last 7 days.         Tmax: 98.6  --->  Tcurrent: 97.9   B/P: 135/68  P: 91  R: 18  SpO2:95% RA        EXAM  AOX4 NAD  Soft, NT, ND  No CCE        A/P:   1. pSBO  - X-ray shows improvement  - Clinically improved  - Trial diet, discharge if tolerates  - Discussed this can happen again, all questions answered.  Patient agreement and comfortable with plan.    Bimal Edwards, DO on 12/12/2022 at 12:02 PM

## 2022-12-12 NOTE — PROGRESS NOTES
Neuro: A&Ox4.  Cardiac: WNL  Respiratory: WNL  GI/: Having small bowel movements. Mild abdominal bloating, abdomen feels soft. BS active. Had 3 small BMs overnight  Mobility: IND.   Diet: NPO   Pain: Knee/abdominal pain.   Skin: WNL.  LDA: PIV infusing LR @ 50/hr    VS: VSS on RA. Afebrile.      Plan: Surgery to see, went down for repeat abdominal XR this AM

## 2022-12-12 NOTE — PLAN OF CARE
Alert and orientated. Still having some abdominal bloating but lots of small bowel movements. Patient is ambulating multiple times throughout shift. Independent. Mild complaints of pain in knee Tylenol given and effective. Did have some complaints of heartburn maalox given with relief. Ice pack to knee several times throughout the shift and a warm blanket for her abdomen. Clear liquids this shift NPO after midnight.

## 2022-12-13 ENCOUNTER — PATIENT OUTREACH (OUTPATIENT)
Dept: CARE COORDINATION | Facility: CLINIC | Age: 67
End: 2022-12-13

## 2022-12-13 NOTE — PROGRESS NOTES
"[facility] offered Clinic Care Coordination to an established [facility] eligible patient and patient declined CCC at this time.    Clinic Care Coordination Contact  [facility]: Post-Discharge Note  SITUATION                                                      Admission:    Admission Date: 12/09/22   Reason for Admission: Worsening nausea, vomiting, and oral intake intolerance.  Discharge:   Discharge Date: 12/12/22  Discharge Diagnosis: Small bowel Obstruction, S/P Left TKA, Hypothyroidism    BACKGROUND                                                      Per hospital discharge summary and inpatient provider notes:    Eleazar Cortez is a 67 year old female with past medical history of left total knee arthroscopy on 12/5/2022 now presents on 12/9/2022 with 3 days of progressive food and fluid intolerance resulting in nausea and vomiting.  Mrs. Cortez states that she was passing gas on Tuesday prior to being discharged postsurgically, but has not in the last \"day or so\", nor has she passed a bowel movement.  She and her  state that on Tuesday she was able to tolerate some soup and experienced 1 episode of vomiting, on Wednesday she had improved intake with no episodes of vomiting, on Thursday she had at least 8 episodes of vomiting vomit was described as nonbloody, partially digested food.  She was not able to tolerate any liquids or solids on Thursday.  And today, Friday, 12/9/2022 she has again not been able to tolerate any intake and has vomited 3 times prior to arrival at the ED.  She did report that she was taking her oxycodone 5 mg every 4 hours for post surgical knee pain.  She has not taken any pain medications today and has no complaints of knee pain.  She stated that her abdominal pain was a 5-7/10 at its peak and it is \"much better now.\"     On arrival in the ED and NG tube was placed to low intermittent suction for decompression.  Eleazar reports significant relief as the stomach contents have been " "suctioned.     ASSESSMENT      Discharge Assessment  How are you doing now that you are home?: \"Good I think I'm doing good yeah. My stomach still feels kind of icky but I think it's just empty still...but I'm able to have my bowel movements. I had one this morning and it was even a little bit more solid so I think that's a good sign. I don't feel as bloated either. My  has been helping and he is a great care taker and my neighbor is actually here now and she's a retired Nurse so I have help near by too if I need it but I think I'm doing okay.\"  How are your symptoms? (Red Flag symptoms escalate to triage hotline per guidelines): Improved  Do you feel your condition is stable enough to be safe at home until your provider visit?: Yes  Does the patient have their discharge instructions? : Yes  Does the patient have questions regarding their discharge instructions? : No  Were you started on any new medications or were there changes to any of your previous medications? : No  Does the patient have all of their medications?: Yes  Do you have questions regarding any of your medications? : No  Do you have all of your needed medical supplies or equipment (DME)?  (i.e. oxygen tank, CPAP, cane, etc.): Yes  Discharge follow-up appointment scheduled within 14 calendar days? : Yes  Discharge Follow Up Appointment Date: 12/19/22  Discharge Follow Up Appointment Scheduled with?: Primary Care Provider    Post-op ([facility] CTA Only)  If the patient had a surgery or procedure, do they have any questions for a nurse?: No      PLAN                                                      Outpatient Plan:      Follow-up and recommended labs and tests  Follow up with primary care provider, Cheryl Serrano, within 7 days  for hospital follow- up. No follow up labs or test are needed.    Future Appointments   Date Time Provider Department Center   12/19/2022 10:00 AM Anisa Newman PA-C [facility]         For any urgent concerns, " please contact our 24 hour nurse triage line: [phone] ([phone])         CARLY Beltran  [phone]  [facility]

## 2022-12-19 ENCOUNTER — OFFICE VISIT (OUTPATIENT)
Dept: FAMILY MEDICINE | Facility: CLINIC | Age: 67
End: 2022-12-19
Payer: COMMERCIAL

## 2022-12-19 VITALS
DIASTOLIC BLOOD PRESSURE: 68 MMHG | HEIGHT: 65 IN | TEMPERATURE: 98.5 F | OXYGEN SATURATION: 99 % | HEART RATE: 99 BPM | BODY MASS INDEX: 24.62 KG/M2 | SYSTOLIC BLOOD PRESSURE: 122 MMHG | WEIGHT: 147.8 LBS

## 2022-12-19 DIAGNOSIS — Z09 HOSPITAL DISCHARGE FOLLOW-UP: Primary | ICD-10-CM

## 2022-12-19 DIAGNOSIS — K56.609 SBO (SMALL BOWEL OBSTRUCTION) (H): ICD-10-CM

## 2022-12-19 DIAGNOSIS — Z96.652 S/P TOTAL KNEE REPLACEMENT, LEFT: ICD-10-CM

## 2022-12-19 PROCEDURE — 99213 OFFICE O/P EST LOW 20 MIN: CPT | Performed by: PHYSICIAN ASSISTANT

## 2022-12-19 RX ORDER — OXYCODONE HYDROCHLORIDE 5 MG/1
5 TABLET ORAL EVERY 6 HOURS PRN
Status: ON HOLD | COMMUNITY
End: 2023-02-01

## 2022-12-19 ASSESSMENT — ENCOUNTER SYMPTOMS
SHORTNESS OF BREATH: 0
VOMITING: 0
NERVOUS/ANXIOUS: 0
NAUSEA: 0
DIAPHORESIS: 0
ABDOMINAL PAIN: 0
HEMATOCHEZIA: 0
COUGH: 0
LIGHT-HEADEDNESS: 0
FEVER: 0
ANAL BLEEDING: 0
CHILLS: 0

## 2022-12-19 ASSESSMENT — PAIN SCALES - GENERAL: PAINLEVEL: MILD PAIN (3)

## 2022-12-19 NOTE — PROGRESS NOTES
"  Assessment & Plan     Hospital discharge follow-up  SBO (small bowel obstruction) (H)  S/P total knee replacement, left  Patient is a 67-year-old female who presents to clinic for hospital discharge follow-up.  Patient was discharged 12/12/2022.  Her admission was due to SBO S/P left TKA.  bowel obstruction was treated with n.p.o., NG tube placement, and IVF.  Patient does have history of lymphoma as well as benign neoplasm of colon/colon polyp-hyperplastic.  She  is due for colonoscopy and has a scheduled for March 2023.  Due to SBO, patient plans to move this to a sooner date.  Vital signs normal.  Physical exam  with hypoactive bowel sounds.  Otherwise, no acute abnormalities.  Patient does continue to use opiate pain medications sparingly and is using stool softeners intermittently.    Discussed the importance of continuing BID daily prescribed Senokot-S  while patient uses opiate pain medications.  Patient is agreeable.  Also discussed the importance of high-fiber diet and hydration.  I agree with plan to move colonoscopy to sooner date.  Patient will notify clinic if she has been able to find a sooner date and priority referral will be placed.  Discussed symptoms that warrant urgent/emergent follow-up and return precautions.    Post Medication Reconciliation Status: discharge medications reconciled, continue medications without change  See Patient Instructions    Return in about 1 week (around 12/26/2022), or if symptoms worsen or fail to improve.    Anisa Newman PA-C  [facility] JACIEL Bush is a 67 year old, presenting for the following health issues:  Hospital F/U      HPI     Post Discharge Outreach 12/13/2022   Admission Date 12/9/2022   Reason for Admission Worsening nausea, vomiting, and oral intake intolerance.   Discharge Date 12/12/2022   Discharge Diagnosis Small bowel Obstruction, S/P Left TKA, Hypothyroidism   How are you doing now that you are home? \"Good I think " "I'm doing good yeah. My stomach still feels kind of icky but I think it's just empty still...but I'm able to have my bowel movements. I had one this morning and it was even a little bit more solid so I think that's a good sign. I don't feel as bloated either. My  has been helping and he is a great care taker and my neighbor is actually here now and she's a retired Nurse so I have help near by too if I need it but I think I'm doing okay.\"   How are your symptoms? (Red Flag symptoms escalate to triage hotline per guidelines) Improved   Do you feel your condition is stable enough to be safe at home until your provider visit? Yes   Does the patient have their discharge instructions?  Yes   Does the patient have questions regarding their discharge instructions?  No   Were you started on any new medications or were there changes to any of your previous medications?  No   Does the patient have all of their medications? Yes   Do you have questions regarding any of your medications?  No   Do you have all of your needed medical supplies or equipment (DME)?  (i.e. oxygen tank, CPAP, cane, etc.) Yes   Discharge follow-up appointment scheduled within 14 calendar days?  Yes   Discharge Follow Up Appointment Date 12/19/2022   Discharge Follow Up Appointment Scheduled with? Primary Care Provider     Hospital Follow-up Visit:    Hospital/Nursing Home/ Rehab Facility: [facility]  Date of Admission: 12/09  Date of Discharge: 12/12  Reason(s) for Admission: nausea and vomiting     Was your hospitalization related to COVID-19? No   Problems taking medications regularly:  None  Medication changes since discharge: None  Problems adhering to non-medication therapy:  None    Summary of hospitalization:  CareEverywhere information obtained and reviewed  Diagnostic Tests/Treatments reviewed.  Follow up needed: none  Other Healthcare Providers Involved in Patient s Care:         None     Update since " "discharge: improved. Patient notes she is feeling better. She has only taken 2 pain pills since hospital discharge. She is using stool softeners. No history of bowel obstruction. Patient has history of Lymphoma with radiation in 2002.     Plan of care communicated with patient     Per chart review, patient admitted to hospital related to SBO that occurred following TKA 12/5/2022.  CT abdomen completed showing SBO without free air or abscess.  General surgery consulted.  Patient made n.p.o. and given IVF.  NG tube placed in ED.  Gastrografin study completed with  good results.  SBO resolved and patient able to tolerate diet prior to discharge.  Opiates thought to be likely related to SBO.  Colonoscopy scheduled 3/24/23       Review of Systems   Constitutional: Negative for chills, diaphoresis and fever.   Respiratory: Negative for cough and shortness of breath.    Cardiovascular: Negative for chest pain.   Gastrointestinal: Negative for abdominal pain, anal bleeding, hematochezia, nausea and vomiting.   Skin: Negative for rash.   Neurological: Negative for light-headedness.   Psychiatric/Behavioral: The patient is not nervous/anxious.             Objective    /68 (BP Location: Left arm, Cuff Size: Adult Large)   Pulse 99   Temp 98.5  F (36.9  C) (Temporal)   Ht 1.651 m (5' 5\")   Wt 67 kg (147 lb 12.8 oz)   SpO2 99%   BMI 24.60 kg/m    Body mass index is 24.6 kg/m .  Physical Exam  Vitals and nursing note reviewed.   Constitutional:       General: She is not in acute distress.     Appearance: Normal appearance.   HENT:      Head: Normocephalic and atraumatic.   Eyes:      Extraocular Movements: Extraocular movements intact.      Pupils: Pupils are equal, round, and reactive to light.   Cardiovascular:      Rate and Rhythm: Normal rate and regular rhythm.      Heart sounds: Normal heart sounds.   Pulmonary:      Effort: Pulmonary effort is normal.      Breath sounds: Normal breath sounds.   Abdominal:      " Palpations: Abdomen is soft.      Tenderness: There is no abdominal tenderness. There is no guarding or rebound.      Comments: Hypoactive bowel sounds    Musculoskeletal:         General: Normal range of motion.      Cervical back: Normal range of motion.   Skin:     General: Skin is warm and dry.   Neurological:      General: No focal deficit present.      Mental Status: She is alert.   Psychiatric:         Mood and Affect: Mood normal.         Behavior: Behavior normal.

## 2022-12-19 NOTE — PATIENT INSTRUCTIONS
I am happy to hear you are doing much better.  While you are taking your pain medication it is important that you continue to take your stool softeners/senna to help avoid another small bowel obstruction/severe constipation.  I would recommend taking these twice daily.  Also, make sure you are eating plenty of high-fiber fruits and vegetables and getting plenty of water.  Once you are done with your narcotic pain medication, please use your stool softeners for another 1-2 days and then you can discontinue these also.    With your history of colon polyps and the recent bowel obstruction, I would like you to move your colonoscopy to a sooner date.  If you are unable to do this, please reach out and let me know.    If you develop signs of a small bowel obstruction such as severe bloating, abdominal pain, nausea/vomiting, please go back to the emergency department for further management.     Reach out with any questions or concerns.

## 2022-12-22 ENCOUNTER — TRANSFERRED RECORDS (OUTPATIENT)
Dept: HEALTH INFORMATION MANAGEMENT | Facility: CLINIC | Age: 67
End: 2022-12-22

## 2023-01-25 ENCOUNTER — TRANSFERRED RECORDS (OUTPATIENT)
Dept: HEALTH INFORMATION MANAGEMENT | Facility: CLINIC | Age: 68
End: 2023-01-25
Payer: COMMERCIAL

## 2023-01-31 ENCOUNTER — HOSPITAL ENCOUNTER (INPATIENT)
Facility: CLINIC | Age: 68
LOS: 2 days | Discharge: HOME | DRG: 389 | End: 2023-02-02
Attending: EMERGENCY MEDICINE | Admitting: INTERNAL MEDICINE
Payer: COMMERCIAL

## 2023-01-31 ENCOUNTER — APPOINTMENT (OUTPATIENT)
Dept: CT IMAGING | Facility: CLINIC | Age: 68
DRG: 389 | End: 2023-01-31
Attending: EMERGENCY MEDICINE
Payer: COMMERCIAL

## 2023-01-31 DIAGNOSIS — R93.5 ABNORMAL CT OF THE ABDOMEN: ICD-10-CM

## 2023-01-31 DIAGNOSIS — K56.609 SBO (SMALL BOWEL OBSTRUCTION) (H): Primary | ICD-10-CM

## 2023-01-31 DIAGNOSIS — R11.2 NAUSEA AND VOMITING, UNSPECIFIED VOMITING TYPE: ICD-10-CM

## 2023-01-31 DIAGNOSIS — K56.609 SMALL BOWEL OBSTRUCTION (H): ICD-10-CM

## 2023-01-31 LAB
ALBUMIN SERPL BCG-MCNC: 4.7 G/DL (ref 3.5–5.2)
ALBUMIN UR-MCNC: 100 MG/DL
ALP SERPL-CCNC: 58 U/L (ref 35–104)
ALT SERPL W P-5'-P-CCNC: 18 U/L (ref 10–35)
AMORPH CRY #/AREA URNS HPF: ABNORMAL /HPF
ANION GAP SERPL CALCULATED.3IONS-SCNC: 14 MMOL/L (ref 7–15)
APPEARANCE UR: ABNORMAL
AST SERPL W P-5'-P-CCNC: 30 U/L (ref 10–35)
BASOPHILS # BLD AUTO: 0.1 10E3/UL (ref 0–0.2)
BASOPHILS NFR BLD AUTO: 1 %
BILIRUB SERPL-MCNC: 0.4 MG/DL
BILIRUB UR QL STRIP: NEGATIVE
BUN SERPL-MCNC: 15.7 MG/DL (ref 8–23)
CALCIUM SERPL-MCNC: 10.5 MG/DL (ref 8.8–10.2)
CHLORIDE SERPL-SCNC: 95 MMOL/L (ref 98–107)
COLOR UR AUTO: YELLOW
CREAT SERPL-MCNC: 0.95 MG/DL (ref 0.51–0.95)
DEPRECATED HCO3 PLAS-SCNC: 30 MMOL/L (ref 22–29)
EOSINOPHIL # BLD AUTO: 0.3 10E3/UL (ref 0–0.7)
EOSINOPHIL NFR BLD AUTO: 2 %
ERYTHROCYTE [DISTWIDTH] IN BLOOD BY AUTOMATED COUNT: 14.3 % (ref 10–15)
GFR SERPL CREATININE-BSD FRML MDRD: 66 ML/MIN/1.73M2
GLUCOSE SERPL-MCNC: 136 MG/DL (ref 70–99)
GLUCOSE UR STRIP-MCNC: NEGATIVE MG/DL
HCT VFR BLD AUTO: 40.5 % (ref 35–47)
HGB BLD-MCNC: 13.2 G/DL (ref 11.7–15.7)
HGB UR QL STRIP: NEGATIVE
HYALINE CASTS: 22 /LPF
IMM GRANULOCYTES # BLD: 0 10E3/UL
IMM GRANULOCYTES NFR BLD: 0 %
KETONES UR STRIP-MCNC: 80 MG/DL
LEUKOCYTE ESTERASE UR QL STRIP: NEGATIVE
LYMPHOCYTES # BLD AUTO: 1.9 10E3/UL (ref 0.8–5.3)
LYMPHOCYTES NFR BLD AUTO: 14 %
MCH RBC QN AUTO: 31.1 PG (ref 26.5–33)
MCHC RBC AUTO-ENTMCNC: 32.6 G/DL (ref 31.5–36.5)
MCV RBC AUTO: 95 FL (ref 78–100)
MONOCYTES # BLD AUTO: 0.8 10E3/UL (ref 0–1.3)
MONOCYTES NFR BLD AUTO: 6 %
MUCOUS THREADS #/AREA URNS LPF: PRESENT /LPF
NEUTROPHILS # BLD AUTO: 10.7 10E3/UL (ref 1.6–8.3)
NEUTROPHILS NFR BLD AUTO: 77 %
NITRATE UR QL: NEGATIVE
NRBC # BLD AUTO: 0 10E3/UL
NRBC BLD AUTO-RTO: 0 /100
PH UR STRIP: 8.5 [PH] (ref 5–7)
PLATELET # BLD AUTO: 414 10E3/UL (ref 150–450)
POTASSIUM SERPL-SCNC: 3.9 MMOL/L (ref 3.4–5.3)
PROT SERPL-MCNC: 8.3 G/DL (ref 6.4–8.3)
RBC # BLD AUTO: 4.25 10E6/UL (ref 3.8–5.2)
RBC URINE: 5 /HPF
SODIUM SERPL-SCNC: 139 MMOL/L (ref 136–145)
SP GR UR STRIP: 1.02 (ref 1–1.03)
SQUAMOUS EPITHELIAL: 1 /HPF
UROBILINOGEN UR STRIP-MCNC: NORMAL MG/DL
WBC # BLD AUTO: 13.7 10E3/UL (ref 4–11)
WBC URINE: 2 /HPF

## 2023-01-31 PROCEDURE — 84443 ASSAY THYROID STIM HORMONE: CPT | Performed by: EMERGENCY MEDICINE

## 2023-01-31 PROCEDURE — 96360 HYDRATION IV INFUSION INIT: CPT | Performed by: EMERGENCY MEDICINE

## 2023-01-31 PROCEDURE — 36415 COLL VENOUS BLD VENIPUNCTURE: CPT | Performed by: EMERGENCY MEDICINE

## 2023-01-31 PROCEDURE — 99285 EMERGENCY DEPT VISIT HI MDM: CPT | Mod: 25 | Performed by: EMERGENCY MEDICINE

## 2023-01-31 PROCEDURE — 85025 COMPLETE CBC W/AUTO DIFF WBC: CPT | Performed by: EMERGENCY MEDICINE

## 2023-01-31 PROCEDURE — 258N000003 HC RX IP 258 OP 636: Performed by: EMERGENCY MEDICINE

## 2023-01-31 PROCEDURE — 99285 EMERGENCY DEPT VISIT HI MDM: CPT | Performed by: EMERGENCY MEDICINE

## 2023-01-31 PROCEDURE — 80053 COMPREHEN METABOLIC PANEL: CPT | Performed by: EMERGENCY MEDICINE

## 2023-01-31 PROCEDURE — 81001 URINALYSIS AUTO W/SCOPE: CPT | Performed by: EMERGENCY MEDICINE

## 2023-01-31 PROCEDURE — 74176 CT ABD & PELVIS W/O CONTRAST: CPT

## 2023-01-31 PROCEDURE — 120N000004 HC R&B MS OVERFLOW

## 2023-01-31 RX ORDER — SODIUM CHLORIDE, SODIUM LACTATE, POTASSIUM CHLORIDE, CALCIUM CHLORIDE 600; 310; 30; 20 MG/100ML; MG/100ML; MG/100ML; MG/100ML
INJECTION, SOLUTION INTRAVENOUS CONTINUOUS
Status: DISCONTINUED | OUTPATIENT
Start: 2023-01-31 | End: 2023-02-01

## 2023-01-31 RX ORDER — SODIUM CHLORIDE 9 MG/ML
1000 INJECTION, SOLUTION INTRAVENOUS CONTINUOUS
Status: DISCONTINUED | OUTPATIENT
Start: 2023-01-31 | End: 2023-02-01

## 2023-01-31 RX ORDER — HYDROMORPHONE HYDROCHLORIDE 1 MG/ML
0.5 INJECTION, SOLUTION INTRAMUSCULAR; INTRAVENOUS; SUBCUTANEOUS EVERY 30 MIN PRN
Status: DISCONTINUED | OUTPATIENT
Start: 2023-01-31 | End: 2023-02-02 | Stop reason: HOSPADM

## 2023-01-31 RX ADMIN — SODIUM CHLORIDE 500 ML: 9 INJECTION, SOLUTION INTRAVENOUS at 21:33

## 2023-01-31 RX ADMIN — SODIUM CHLORIDE, POTASSIUM CHLORIDE, SODIUM LACTATE AND CALCIUM CHLORIDE: 600; 310; 30; 20 INJECTION, SOLUTION INTRAVENOUS at 23:15

## 2023-01-31 ASSESSMENT — ACTIVITIES OF DAILY LIVING (ADL)
ADLS_ACUITY_SCORE: 33
ADLS_ACUITY_SCORE: 35

## 2023-01-31 ASSESSMENT — ENCOUNTER SYMPTOMS
HEMATOLOGIC/LYMPHATIC NEGATIVE: 1
MUSCULOSKELETAL NEGATIVE: 1
EYES NEGATIVE: 1
ENDOCRINE NEGATIVE: 1
CONSTITUTIONAL NEGATIVE: 1
RESPIRATORY NEGATIVE: 1
PSYCHIATRIC NEGATIVE: 1
ALLERGIC/IMMUNOLOGIC NEGATIVE: 1
ABDOMINAL PAIN: 1
NEUROLOGICAL NEGATIVE: 1

## 2023-02-01 LAB
ANION GAP SERPL CALCULATED.3IONS-SCNC: 10 MMOL/L (ref 7–15)
BASOPHILS # BLD AUTO: 0.1 10E3/UL (ref 0–0.2)
BASOPHILS NFR BLD AUTO: 1 %
BUN SERPL-MCNC: 14.7 MG/DL (ref 8–23)
CALCIUM SERPL-MCNC: 8.5 MG/DL (ref 8.8–10.2)
CHLORIDE SERPL-SCNC: 106 MMOL/L (ref 98–107)
CREAT SERPL-MCNC: 0.83 MG/DL (ref 0.51–0.95)
DEPRECATED HCO3 PLAS-SCNC: 26 MMOL/L (ref 22–29)
EOSINOPHIL # BLD AUTO: 0.5 10E3/UL (ref 0–0.7)
EOSINOPHIL NFR BLD AUTO: 6 %
ERYTHROCYTE [DISTWIDTH] IN BLOOD BY AUTOMATED COUNT: 14.4 % (ref 10–15)
GFR SERPL CREATININE-BSD FRML MDRD: 77 ML/MIN/1.73M2
GLUCOSE SERPL-MCNC: 110 MG/DL (ref 70–99)
HCT VFR BLD AUTO: 32.8 % (ref 35–47)
HGB BLD-MCNC: 10.6 G/DL (ref 11.7–15.7)
IMM GRANULOCYTES # BLD: 0 10E3/UL
IMM GRANULOCYTES NFR BLD: 0 %
LYMPHOCYTES # BLD AUTO: 1.9 10E3/UL (ref 0.8–5.3)
LYMPHOCYTES NFR BLD AUTO: 23 %
MCH RBC QN AUTO: 31.2 PG (ref 26.5–33)
MCHC RBC AUTO-ENTMCNC: 32.3 G/DL (ref 31.5–36.5)
MCV RBC AUTO: 97 FL (ref 78–100)
MONOCYTES # BLD AUTO: 0.8 10E3/UL (ref 0–1.3)
MONOCYTES NFR BLD AUTO: 9 %
NEUTROPHILS # BLD AUTO: 5.2 10E3/UL (ref 1.6–8.3)
NEUTROPHILS NFR BLD AUTO: 61 %
NRBC # BLD AUTO: 0 10E3/UL
NRBC BLD AUTO-RTO: 0 /100
PLATELET # BLD AUTO: 327 10E3/UL (ref 150–450)
POTASSIUM SERPL-SCNC: 3.9 MMOL/L (ref 3.4–5.3)
RBC # BLD AUTO: 3.4 10E6/UL (ref 3.8–5.2)
SODIUM SERPL-SCNC: 142 MMOL/L (ref 136–145)
TSH SERPL DL<=0.005 MIU/L-ACNC: 2.75 UIU/ML (ref 0.3–4.2)
WBC # BLD AUTO: 8.4 10E3/UL (ref 4–11)

## 2023-02-01 PROCEDURE — 99222 1ST HOSP IP/OBS MODERATE 55: CPT | Mod: AI | Performed by: INTERNAL MEDICINE

## 2023-02-01 PROCEDURE — 36415 COLL VENOUS BLD VENIPUNCTURE: CPT | Performed by: INTERNAL MEDICINE

## 2023-02-01 PROCEDURE — 258N000003 HC RX IP 258 OP 636: Performed by: EMERGENCY MEDICINE

## 2023-02-01 PROCEDURE — 120N000004 HC R&B MS OVERFLOW

## 2023-02-01 PROCEDURE — 250N000013 HC RX MED GY IP 250 OP 250 PS 637: Performed by: EMERGENCY MEDICINE

## 2023-02-01 PROCEDURE — 80048 BASIC METABOLIC PNL TOTAL CA: CPT | Performed by: INTERNAL MEDICINE

## 2023-02-01 PROCEDURE — 85004 AUTOMATED DIFF WBC COUNT: CPT | Performed by: INTERNAL MEDICINE

## 2023-02-01 RX ORDER — ONDANSETRON 2 MG/ML
4 INJECTION INTRAMUSCULAR; INTRAVENOUS EVERY 6 HOURS PRN
Status: DISCONTINUED | OUTPATIENT
Start: 2023-02-01 | End: 2023-02-02 | Stop reason: HOSPADM

## 2023-02-01 RX ORDER — ONDANSETRON 4 MG/1
4 TABLET, ORALLY DISINTEGRATING ORAL EVERY 6 HOURS PRN
Status: DISCONTINUED | OUTPATIENT
Start: 2023-02-01 | End: 2023-02-02 | Stop reason: HOSPADM

## 2023-02-01 RX ORDER — LIDOCAINE 40 MG/G
CREAM TOPICAL
Status: DISCONTINUED | OUTPATIENT
Start: 2023-02-01 | End: 2023-02-02 | Stop reason: HOSPADM

## 2023-02-01 RX ORDER — LEVOTHYROXINE SODIUM 50 UG/1
50 TABLET ORAL DAILY
Status: DISCONTINUED | OUTPATIENT
Start: 2023-02-01 | End: 2023-02-02 | Stop reason: HOSPADM

## 2023-02-01 RX ORDER — ENOXAPARIN SODIUM 100 MG/ML
40 INJECTION SUBCUTANEOUS EVERY 24 HOURS
Status: DISCONTINUED | OUTPATIENT
Start: 2023-02-01 | End: 2023-02-02 | Stop reason: HOSPADM

## 2023-02-01 RX ADMIN — LEVOTHYROXINE SODIUM 50 MCG: 0.05 TABLET ORAL at 08:10

## 2023-02-01 RX ADMIN — DEXTROSE AND SODIUM CHLORIDE: 5; 900 INJECTION, SOLUTION INTRAVENOUS at 02:36

## 2023-02-01 ASSESSMENT — ACTIVITIES OF DAILY LIVING (ADL)
ADLS_ACUITY_SCORE: 35
ADLS_ACUITY_SCORE: 20
VISION_MANAGEMENT: GLASSES
DIFFICULTY_EATING/SWALLOWING: NO
WEAR_GLASSES_OR_BLIND: YES
ADLS_ACUITY_SCORE: 20
TOILETING_ISSUES: NO
ADLS_ACUITY_SCORE: 20
CONCENTRATING,_REMEMBERING_OR_MAKING_DECISIONS_DIFFICULTY: NO
ADLS_ACUITY_SCORE: 20
WALKING_OR_CLIMBING_STAIRS_DIFFICULTY: NO
ADLS_ACUITY_SCORE: 20
CHANGE_IN_FUNCTIONAL_STATUS_SINCE_ONSET_OF_CURRENT_ILLNESS/INJURY: YES
ADLS_ACUITY_SCORE: 20
DRESSING/BATHING_DIFFICULTY: NO
ADLS_ACUITY_SCORE: 35
DOING_ERRANDS_INDEPENDENTLY_DIFFICULTY: NO
ADLS_ACUITY_SCORE: 20
ADLS_ACUITY_SCORE: 20
FALL_HISTORY_WITHIN_LAST_SIX_MONTHS: NO

## 2023-02-01 NOTE — ED NOTES
Writer provided report to THONY Sawyer in ICU. Pt will be going to room 7 as a boarder. Pt remains A/Ox4. VSS. PIV infusing fluids, see MAR. Pain good and denied N/V. No NG at this time. Continue to evaluate N/V. 1 bag of belongings sent with pt.

## 2023-02-01 NOTE — ED PROVIDER NOTES
History     Chief Complaint   Patient presents with     Abdominal Pain     HPI  Eleazar Cortez is a 67 year old female who presents with report of abdominal pain similar to when she was hospitalized for small bowel obstruction a month prior.    Medical records show prior diagnosis of non-Hodgkin's lymphoma in remission, history of osteoporosis and hypothyroidism.  On examination patient was accompanied by her  William reporting that she developed nausea today and was concerned that she had a new obstruction.  She reports since her hospital discharge a month prior she had still had some abdominal discomfort.  She tried to eat lunch today without relief although she had some while in triage reports that she is feeling some improvement.  She said no fever or chills.  She reports no back pain or flank pain and no urinary symptoms.  With ongoing symptoms through the day she presents for further care    Allergies:  Allergies   Allergen Reactions     Codeine Difficulty breathing     Also felt light headed.  After her arthroscopy 16-18 hour after surgery ( had been taking the Tylenol #3 ) did have an episode with lightheadedness and difficulty breathing/. Also felt like her neck has gotten stiff, couldn't move.  Lasting less than 5 min.   Has been able to take Tylenol since then without problems.       Dye [Contrast Dye] Other (See Comments)     congestion       Problem List:    Patient Active Problem List    Diagnosis Date Noted     Small bowel obstruction (H) 01/31/2023     Priority: Medium     Abnormal CT of the abdomen 01/31/2023     Priority: Medium     SBO (small bowel obstruction) (H) 12/09/2022     Priority: Medium     Nausea and vomiting, unspecified vomiting type 12/09/2022     Priority: Medium     Osteoporosis without current pathological fracture, unspecified osteoporosis type 07/15/2022     Priority: Medium     Lymphoma in remission (H) 02/10/2021     Priority: Medium     Superficial basal cell carcinoma  01/15/2020     Priority: Medium     Shoulder pain, right 10/27/2016     Priority: Medium     Knee pain 03/26/2015     Priority: Medium     Other postprocedural status(V45.89) 03/26/2015     Priority: Medium     Tear of lateral cartilage or meniscus of knee, current 06/15/2014     Priority: Medium     Colon polyp, hyperplastic 10/11/2012     Priority: Medium     Environmental allergies 10/11/2012     Priority: Medium     Hypothyroidism 10/11/2012     Priority: Medium     CARDIOVASCULAR SCREENING; LDL GOAL LESS THAN 160 10/31/2010     Priority: Medium     Seborrheic Keratosis right leg 02/23/2009     Priority: Medium             Basal Cell Carcinoma of right lower medial Eyelid 02/16/2009     Priority: Medium     Dr.J Rogers Mohs excision, four stages  04/30/09 normal Skin exam no signs of recurrent malignancy. Eyelid doing well       Atrophic vaginitis 11/22/2008     Priority: Medium     Benign neoplasm of colon 12/27/2006     Priority: Medium     12/27/06- colonoscopy revealed 2 hyperplastic polyps                  Plan repeat colonoscopy in 5 years       NONHODGKIN'S LYMPHOMA IN REMISSION 07/07/2006     Priority: Medium     Diagnosed in 2002: Stage I Non-Hodgkin's Lymphoma, Grade I, Follicular, Left Groin.  Fully resected, radiation.  No evidence of disease since.  6/28/2006 Dr Bri aKur @ [facility]:  Last CT scan normal. CBC normal.  Plan: RTC in 6 months, with a CT prior.  12/18/08 @ [facility]:Ct 12/15/08 NL, CBC NL RTC in 2 years. No reacurrance of Dz       Disorder of bone and cartilage 12/12/2005     Priority: Medium     Osteopenia: Dexa in 12/2005 shows stable bone density which is moderately thin in hip area but normal in spine.   Would recommend she continue with fosamax  Problem list name updated by automated process. Provider to review          Past Medical History:    Past Medical History:   Diagnosis Date     Arthritis 2014     BCC (basal cell  "carcinoma), face 2009     Hypothyroidism 10/11/2012     Osteoporosis      Other malignant lymphomas, unspecified site, extranodal and solid organ sites      Scoliosis (and kyphoscoliosis), idiopathic      Uncomplicated asthma childhood       Past Surgical History:    Past Surgical History:   Procedure Laterality Date     ARTHROPLASTY KNEE Left 2022    Procedure: LEFT TOTAL KNEE ARTHROPLASTY;  Surgeon: Daren Coleman MD;  Location: [facility] Main OR     BIOPSY  , ?    leg, nose     COLONOSCOPY  2012    Procedure: COLONOSCOPY;  Colonoscopy;  Surgeon: Drew Perdue MD;  Location: [facility]     ORTHOPEDIC SURGERY Left ,     broke radius by wrist... plate and 9 screws put in, arthrosc     SURGICAL HISTORY OF -   2001    Lymph Node Excision on left leg     SURGICAL HISTORY OF -   2009    BCC removal right nasal area     ZZC TOTAL KNEE ARTHROPLASTY Right 2015       Family History:    Family History   Problem Relation Age of Onset     C.A.D. Father         heart problems at age 40     Arthritis Father         bilat knee replacements \"bowlegged\"     Coronary Artery Disease Father         heart attack 43 years ago     Asthma Father      Lipids Mother      Arthritis Mother         knees/fingers     Hypertension Mother      Other Cancer Mother         uterine cancer, 2016     Cardiovascular Paternal Grandfather      Coronary Artery Disease Paternal Grandfather          of heart attack     Neurologic Disorder Maternal Grandfather         park     Diabetes Maternal Aunt      Osteoporosis Paternal Grandmother      Arthritis Son         spondylolysis     Coronary Artery Disease Other         Congestive heart failure     Asthma Other         paternal aunt, adult onset     Asthma Other         paternal aunt       Social History:  Marital Status:   [2]  Social History     Tobacco Use     Smoking status: Never     Smokeless tobacco: Never   Vaping Use     Vaping Use: Never " "used   Substance Use Topics     Alcohol use: No     Drug use: No        Medications:    acetaminophen (TYLENOL) 500 MG tablet  aspirin (ASA) 325 MG EC tablet  Calcium Citrate-Vitamin D (CALCIUM + D PO)  IBANdronate (BONIVA) 150 MG tablet  ibuprofen (ADVIL/MOTRIN) 200 MG tablet  levothyroxine (SYNTHROID/LEVOTHROID) 50 MCG tablet  methocarbamol (ROBAXIN) 500 MG tablet  oxyCODONE (ROXICODONE) 5 MG tablet  senna-docusate (SENOKOT-S/PERICOLACE) 8.6-50 MG tablet  UNABLE TO FIND          Review of Systems   Constitutional: Negative.    HENT: Negative.    Eyes: Negative.    Respiratory: Negative.    Gastrointestinal: Positive for abdominal pain.   Endocrine: Negative.    Genitourinary: Negative.    Musculoskeletal: Negative.    Skin: Negative.    Allergic/Immunologic: Negative.    Neurological: Negative.    Hematological: Negative.    Psychiatric/Behavioral: Negative.    All other systems reviewed and are negative.      Physical Exam   BP: 113/66  Pulse: 72  Temp: 97.6  F (36.4  C)  Resp: 16  Height: 165.1 cm (5' 5\")  Weight: 63 kg (139 lb)  SpO2: 100 %      Physical Exam  Constitutional:       General: She is not in acute distress.     Appearance: She is well-developed. She is not ill-appearing, toxic-appearing or diaphoretic.   HENT:      Head: Normocephalic and atraumatic.      Mouth/Throat:      Mouth: Mucous membranes are moist.   Cardiovascular:      Rate and Rhythm: Normal rate.   Pulmonary:      Effort: Pulmonary effort is normal. No respiratory distress.      Breath sounds: Normal breath sounds. No stridor. No wheezing, rhonchi or rales.   Chest:      Chest wall: No tenderness.   Abdominal:      General: Abdomen is flat.      Palpations: Abdomen is soft. There is no shifting dullness, fluid wave, hepatomegaly, splenomegaly, mass or pulsatile mass.      Tenderness: There is abdominal tenderness in the left lower quadrant.   Skin:     Capillary Refill: Capillary refill takes less than 2 seconds.      Coloration: Skin " "is not cyanotic, jaundiced, mottled or pale.      Findings: No erythema or rash.   Neurological:      General: No focal deficit present.      Mental Status: She is alert and oriented to person, place, and time.      Cranial Nerves: No cranial nerve deficit.      Motor: No weakness.   Psychiatric:         Mood and Affect: Mood normal. Mood is not anxious or depressed.         Behavior: Behavior normal.         ED Course                 Procedures              Critical Care time:  none             ED medications:  Medications   sodium chloride 0.9% infusion (has no administration in time range)   HYDROmorphone (PF) (DILAUDID) injection 0.5 mg ( Intravenous Canceled Entry 1/31/23 2133)   lactated ringers infusion (has no administration in time range)   0.9% sodium chloride BOLUS (500 mLs Intravenous New Bag 1/31/23 2133)     ED Vitals:  Vitals:    01/31/23 1814 01/31/23 2209   BP: 113/66 132/84   Pulse: 72 91   Resp: 16 16   Temp: 97.6  F (36.4  C)    TempSrc: Oral    SpO2: 100% 98%   Weight: 63 kg (139 lb)    Height: 1.651 m (5' 5\")      Results for orders placed or performed during the hospital encounter of 01/31/23 (from the past 24 hour(s))   CBC with Platelets & Differential    Narrative    The following orders were created for panel order CBC with Platelets & Differential.  Procedure                               Abnormality         Status                     ---------                               -----------         ------                     CBC with platelets and d...[[id number]]  Abnormal            Final result                 Please view results for these tests on the individual orders.   Comprehensive metabolic panel   Result Value Ref Range    Sodium 139 136 - 145 mmol/L    Potassium 3.9 3.4 - 5.3 mmol/L    Chloride 95 (L) 98 - 107 mmol/L    Carbon Dioxide (CO2) 30 (H) 22 - 29 mmol/L    Anion Gap 14 7 - 15 mmol/L    Urea Nitrogen 15.7 8.0 - 23.0 mg/dL    Creatinine 0.95 0.51 - 0.95 mg/dL    Calcium 10.5 " (H) 8.8 - 10.2 mg/dL    Glucose 136 (H) 70 - 99 mg/dL    Alkaline Phosphatase 58 35 - 104 U/L    AST 30 10 - 35 U/L    ALT 18 10 - 35 U/L    Protein Total 8.3 6.4 - 8.3 g/dL    Albumin 4.7 3.5 - 5.2 g/dL    Bilirubin Total 0.4 <=1.2 mg/dL    GFR Estimate 66 >60 mL/min/1.73m2   CBC with platelets and differential   Result Value Ref Range    WBC Count 13.7 (H) 4.0 - 11.0 10e3/uL    RBC Count 4.25 3.80 - 5.20 10e6/uL    Hemoglobin 13.2 11.7 - 15.7 g/dL    Hematocrit 40.5 35.0 - 47.0 %    MCV 95 78 - 100 fL    MCH 31.1 26.5 - 33.0 pg    MCHC 32.6 31.5 - 36.5 g/dL    RDW 14.3 10.0 - 15.0 %    Platelet Count 414 150 - 450 10e3/uL    % Neutrophils 77 %    % Lymphocytes 14 %    % Monocytes 6 %    % Eosinophils 2 %    % Basophils 1 %    % Immature Granulocytes 0 %    NRBCs per 100 WBC 0 <1 /100    Absolute Neutrophils 10.7 (H) 1.6 - 8.3 10e3/uL    Absolute Lymphocytes 1.9 0.8 - 5.3 10e3/uL    Absolute Monocytes 0.8 0.0 - 1.3 10e3/uL    Absolute Eosinophils 0.3 0.0 - 0.7 10e3/uL    Absolute Basophils 0.1 0.0 - 0.2 10e3/uL    Absolute Immature Granulocytes 0.0 <=0.4 10e3/uL    Absolute NRBCs 0.0 10e3/uL   UA with Microscopic reflex to Culture    Specimen: Urine, Midstream   Result Value Ref Range    Color Urine Yellow Colorless, Straw, Light Yellow, Yellow    Appearance Urine Cloudy (A) Clear    Glucose Urine Negative Negative mg/dL    Bilirubin Urine Negative Negative    Ketones Urine 80 (A) Negative mg/dL    Specific Gravity Urine 1.020 1.003 - 1.035    Blood Urine Negative Negative    pH Urine 8.5 (H) 5.0 - 7.0    Protein Albumin Urine 100 (A) Negative mg/dL    Urobilinogen Urine Normal Normal, 2.0 mg/dL    Nitrite Urine Negative Negative    Leukocyte Esterase Urine Negative Negative    Mucus Urine Present (A) None Seen /LPF    Amorphous Crystals Urine Few (A) None Seen /HPF    RBC Urine 5 (H) <=2 /HPF    WBC Urine 2 <=5 /HPF    Squamous Epithelials Urine 1 <=1 /HPF    Hyaline Casts Urine 22 (H) <=2 /LPF    Narrative     Urine Culture not indicated   CT Abdomen Pelvis w/o Contrast    Narrative    EXAM: CT ABDOMEN PELVIS W/O CONTRAST  LOCATION: [facility]  DATE/TIME: 1/31/2023 9:31 PM    INDICATION: Abdominal pain. History of small bowel obstruction.  Last imaging 12/9/2022.  Evaluate for recurrent bowel obstruction versus other acute intra-abdominal process and/or catastrophe.  COMPARISON: 12/09/2022  TECHNIQUE: CT scan of the abdomen and pelvis was performed without IV contrast. Multiplanar reformats were obtained. Dose reduction techniques were used.  CONTRAST: None.    FINDINGS:   LOWER CHEST: Normal.    HEPATOBILIARY: Normal.    PANCREAS: Normal.    SPLEEN: Normal.    ADRENAL GLANDS: Normal.    KIDNEYS/BLADDER: Normal.    BOWEL: There is mild fluid-filled distention of multiple loops of small bowel less pronounced on the prior examination with the distal small bowel decompressed. Findings compatible with early or partial small bowel obstruction. Transition in the upper   pelvis.    LYMPH NODES: Normal.    VASCULATURE: Unremarkable.    PELVIC ORGANS: Normal.    MUSCULOSKELETAL: Scoliosis.      Impression    IMPRESSION: Mechanical small bowel obstruction again seen, although less pronounced on the prior examination. Mild distention of multiple small bowel with decompression of the distal small bowel. Mild transition seen in the upper pelvis.           Assessments & Plan (with Medical Decision Making)   Assessment Summary and Clinical Impression: 67-year-old female present with 2 days of abdominal pain and discomfort with prior history of bowel obstruction.  Patient arrived afebrile and hemodynamically normal.  She is on Boniva and levothyroxine.  She reported that she had developed pain today with vomiting and she was concerned that she had persistent mild discomfort since her hospitalization for bowel obstruction about a month prior.  She was curious if she still had another obstruction and wanted to  ensure there was no other process contributing to her symptoms.  On exam she reported she had vomited while in triage and felt much improved.  She was hemodynamically normal.  She had hyperactive bowel sounds in the abdomen with no localizing tenderness.  Imaging was obtained which revealed a mechanical small bowel obstruction although less pronounced from prior imaging a month prior with transition point in the upper pelvis.  After discussion with surgery on-call plan is to admit for trial of medical management and care.  Reviewed NG placement and patient prefer to hold on NG if not medically required.  Her nausea and vomiting was well controlled and her pain was well managed.    At shift end patient is boarding the ED in need of further inpatient care for mechanical small bowel obstruction with concern for recurrence with no prior history of abdominal surgery or prior treatment radiation therapy.      ED course and plan;  Reviewed the medical record.   Reviewed hospital course from 12/9 through 12/11/2022.  Work-up completed  revealed a leukocytosis of 13.7 with left shift.  Urinalysis revealed no evidence for infection.  Electrolytes were normal.  Total serum calcium was 10.5.  With history of recent hospitalization for bowel obstruction and patient symptoms advanced imaging without contrast was obtained to evaluate for recurrence of bowel obstruction versus other intra-abdominal catastrophe.  We discussed her allergy to contrast dye and ultimately agreed to reimage without contrast.  CT revealed mechanical small bowel obstruction with transition point in the upper pelvis.    Spoke with Dr. Ferguson at 10.25pm surgery on-call about recent hospital course a month prior and repeat imaging today and presentation. We dicussed patient's apprehension about NG to placement in light of her recent hospital course.  We discussed her Gastrografin challenge during her last hospitalization with findings of partial small bowel  obstruction noted.  Dr. Ferguson felt it was reasonable to hold on NG tube placement given no active nausea or vomiting, pain well managed with plan to admit to medicine and general surgery to follow.  See surgery consult note for further details.    Unfortunately there are no inpatient beds currently.  Patient will board in the emergency department until a bed becomes available.  She may require transfer for further care.  We discussed NG tube placement.  Patient was open to NG placement and would prefer to not have an NG placed if not medically required.  She reports no prior history of abdominal surgery although she was treated with radiation therapy for follicular B-cell lymphoma in 2002.    At shift end at 11.50pm patient signed out to Dr. CHARLOTTE Piper.  Plan is to board in the ED until bed becomes available in hopes she can be admitted to [facility] if able. NG maybe placed if patient desires with goal for medical management and general surgery to follow      Disclaimer: This note consists of symbols derived from keyboarding, dictation and/or voice recognition software. As a result, there may be errors in the script that have gone undetected. Please consider this when interpreting information found in this chart.  I have reviewed the nursing notes.    I have reviewed the findings, diagnosis, plan and need for follow up with the patient.       Medical Decision Making  The patient's presentation is strongly suggestive of an acute illness with systemic symptoms.    The patient's evaluation involved:  ordering and/or review of 3+ test(s) in this encounter (Diagnostic imaging and labs)    The patient's management involved a decision regarding hospitalization.        New Prescriptions    No medications on file       Final diagnoses:   Small bowel obstruction (H) - recurring. Last hospital course- 12/9/22   Abnormal CT of the abdomen - Mechanical small bowel obstruction again seen, although less pronounced on the prior examination        1/31/2023   [facility] EMERGENCY DEPT     Gorge Perez MD  01/31/23 1088       Gorge Perez MD  01/31/23 0329

## 2023-02-01 NOTE — ED PROVIDER NOTES
"     Emergency Department Patient Sign-out       Brief HPI:  This is a 67 year old female signed out to me by Dr. Perez .  See initial ED Provider note for details of the presentation.            Significant Events prior to my assuming care:   67 year old female with history of non-Hodgkin's lymphoma currently in remission, hypothyroidism, and hospitalization December 9 through December 11 with small bowel obstruction, now presenting the emergency department with abdominal pain x1 day, and found to have recurrent mechanical small bowel obstruction on CT scan imaging.    Laboratory work-up unremarkable.  White blood cell count 13.7, with no infectious symptoms.  Localized tenderness of abdomen.  NG tube on hold at this point as patient prefers not to have this, and this had been discussed by previous provider with surgery.    No beds available during overnight hours, and patient n.p.o., awaiting available hospital bed.    I did have discussion with the patient around 11:45 PM.  Patient was resting in bed comfortably.  No current nausea or severe pains.  Will allow patient to rest overnight, with planned signout to me morning, and awaiting available hospital bed for treatment of bowel obstruction.    Uncertain if the transition point is similar to the prior transition point, if obstruction is related to history of prior radiation therapy, or alternative cause of recurrent obstructions.      Exam:   Patient Vitals for the past 24 hrs:   BP Temp Temp src Pulse Resp SpO2 Height Weight   02/01/23 0140 -- -- -- -- 16 95 % -- --   02/01/23 0110 -- -- -- -- -- 93 % -- --   02/01/23 0040 -- -- -- -- -- 91 % -- --   02/01/23 0010 -- -- -- -- -- 94 % -- --   01/31/23 2340 -- -- -- -- -- 93 % -- --   01/31/23 2300 128/75 -- -- 98 -- -- -- --   01/31/23 2230 127/73 -- -- 88 -- -- -- --   01/31/23 2209 132/84 -- -- 91 16 98 % -- --   01/31/23 1814 113/66 97.6  F (36.4  C) Oral 72 16 100 % 1.651 m (5' 5\") 63 kg (139 lb) "           ED RESULTS:   Results for orders placed or performed during the hospital encounter of 01/31/23 (from the past 24 hour(s))   CBC with Platelets & Differential     Status: Abnormal    Collection Time: 01/31/23  6:34 PM    Narrative    The following orders were created for panel order CBC with Platelets & Differential.  Procedure                               Abnormality         Status                     ---------                               -----------         ------                     CBC with platelets and d...[[id number]]  Abnormal            Final result                 Please view results for these tests on the individual orders.   Comprehensive metabolic panel     Status: Abnormal    Collection Time: 01/31/23  6:34 PM   Result Value Ref Range    Sodium 139 136 - 145 mmol/L    Potassium 3.9 3.4 - 5.3 mmol/L    Chloride 95 (L) 98 - 107 mmol/L    Carbon Dioxide (CO2) 30 (H) 22 - 29 mmol/L    Anion Gap 14 7 - 15 mmol/L    Urea Nitrogen 15.7 8.0 - 23.0 mg/dL    Creatinine 0.95 0.51 - 0.95 mg/dL    Calcium 10.5 (H) 8.8 - 10.2 mg/dL    Glucose 136 (H) 70 - 99 mg/dL    Alkaline Phosphatase 58 35 - 104 U/L    AST 30 10 - 35 U/L    ALT 18 10 - 35 U/L    Protein Total 8.3 6.4 - 8.3 g/dL    Albumin 4.7 3.5 - 5.2 g/dL    Bilirubin Total 0.4 <=1.2 mg/dL    GFR Estimate 66 >60 mL/min/1.73m2   CBC with platelets and differential     Status: Abnormal    Collection Time: 01/31/23  6:34 PM   Result Value Ref Range    WBC Count 13.7 (H) 4.0 - 11.0 10e3/uL    RBC Count 4.25 3.80 - 5.20 10e6/uL    Hemoglobin 13.2 11.7 - 15.7 g/dL    Hematocrit 40.5 35.0 - 47.0 %    MCV 95 78 - 100 fL    MCH 31.1 26.5 - 33.0 pg    MCHC 32.6 31.5 - 36.5 g/dL    RDW 14.3 10.0 - 15.0 %    Platelet Count 414 150 - 450 10e3/uL    % Neutrophils 77 %    % Lymphocytes 14 %    % Monocytes 6 %    % Eosinophils 2 %    % Basophils 1 %    % Immature Granulocytes 0 %    NRBCs per 100 WBC 0 <1 /100    Absolute Neutrophils 10.7 (H) 1.6 - 8.3 10e3/uL     Absolute Lymphocytes 1.9 0.8 - 5.3 10e3/uL    Absolute Monocytes 0.8 0.0 - 1.3 10e3/uL    Absolute Eosinophils 0.3 0.0 - 0.7 10e3/uL    Absolute Basophils 0.1 0.0 - 0.2 10e3/uL    Absolute Immature Granulocytes 0.0 <=0.4 10e3/uL    Absolute NRBCs 0.0 10e3/uL   UA with Microscopic reflex to Culture     Status: Abnormal    Collection Time: 01/31/23  6:35 PM    Specimen: Urine, Midstream   Result Value Ref Range    Color Urine Yellow Colorless, Straw, Light Yellow, Yellow    Appearance Urine Cloudy (A) Clear    Glucose Urine Negative Negative mg/dL    Bilirubin Urine Negative Negative    Ketones Urine 80 (A) Negative mg/dL    Specific Gravity Urine 1.020 1.003 - 1.035    Blood Urine Negative Negative    pH Urine 8.5 (H) 5.0 - 7.0    Protein Albumin Urine 100 (A) Negative mg/dL    Urobilinogen Urine Normal Normal, 2.0 mg/dL    Nitrite Urine Negative Negative    Leukocyte Esterase Urine Negative Negative    Mucus Urine Present (A) None Seen /LPF    Amorphous Crystals Urine Few (A) None Seen /HPF    RBC Urine 5 (H) <=2 /HPF    WBC Urine 2 <=5 /HPF    Squamous Epithelials Urine 1 <=1 /HPF    Hyaline Casts Urine 22 (H) <=2 /LPF    Narrative    Urine Culture not indicated   CT Abdomen Pelvis w/o Contrast     Status: None    Collection Time: 01/31/23  9:31 PM    Narrative    EXAM: CT ABDOMEN PELVIS W/O CONTRAST  LOCATION: [facility]  DATE/TIME: 1/31/2023 9:31 PM    INDICATION: Abdominal pain. History of small bowel obstruction.  Last imaging 12/9/2022.  Evaluate for recurrent bowel obstruction versus other acute intra-abdominal process and/or catastrophe.  COMPARISON: 12/09/2022  TECHNIQUE: CT scan of the abdomen and pelvis was performed without IV contrast. Multiplanar reformats were obtained. Dose reduction techniques were used.  CONTRAST: None.    FINDINGS:   LOWER CHEST: Normal.    HEPATOBILIARY: Normal.    PANCREAS: Normal.    SPLEEN: Normal.    ADRENAL GLANDS: Normal.    KIDNEYS/BLADDER:  Normal.    BOWEL: There is mild fluid-filled distention of multiple loops of small bowel less pronounced on the prior examination with the distal small bowel decompressed. Findings compatible with early or partial small bowel obstruction. Transition in the upper   pelvis.    LYMPH NODES: Normal.    VASCULATURE: Unremarkable.    PELVIC ORGANS: Normal.    MUSCULOSKELETAL: Scoliosis.      Impression    IMPRESSION: Mechanical small bowel obstruction again seen, although less pronounced on the prior examination. Mild distention of multiple small bowel with decompression of the distal small bowel. Mild transition seen in the upper pelvis.         ED MEDICATIONS:   Medications   sodium chloride 0.9% infusion (1,000 mLs Intravenous Not Given 1/31/23 2306)   HYDROmorphone (PF) (DILAUDID) injection 0.5 mg ( Intravenous Canceled Entry 1/31/23 2133)   lactated ringers infusion ( Intravenous New Bag 1/31/23 2315)   0.9% sodium chloride BOLUS (0 mLs Intravenous Stopped 1/31/23 2306)         Impression:    ICD-10-CM    1. Small bowel obstruction (H)  K56.609     recurring. Last hospital course- 12/9/22      2. Abnormal CT of the abdomen  R93.5     Mechanical small bowel obstruction again seen, although less pronounced on the prior examination          Plan:    Admit to medicine service when bed becomes available.      I discussed with Dr. Alba at 3:30 AM.  Patient accepted for inpatient hospitalization by hospitalist          MD Feliz Reddy, Tolu Leroy MD  02/01/23 9048       Tolu Piper MD  02/01/23 7459

## 2023-02-01 NOTE — PROGRESS NOTES
"[facility] ADMISSION NOTE    Patient admitted to room 1007 at approximately 4:00 via stretcher from ED. Patient was accompanied by staff.     Verbal SBAR report received from Lin prior to patient arrival.     Patient ambulated.  Patient alert and oriented X 4. 0/10 pain  . Admission vital signs: Blood pressure 133/83, pulse 97, temperature 99.6  F (37.6  C), temperature source Oral, resp. rate 19, height 1.778 m (5' 10\"), weight 108 kg (238 lb 1.6 oz), SpO2 98 %. Patient oriented to plan of care, \"call light\",\"bed controls\",\"tv\",\"telephone\",\"bathroom\",\"visiting hours\"}.     Risk Assessment    The following safety risks were identified during admission: Yellow risk band applied: no    Skin Initial Assessment    This writer admitted this patient and completed a full skin assessment and Scott score in the Adult PCS flowsheet. Appropriate interventions initiated as needed.     Secondary skin check completed by THONY Cao.    Scott Risk Assessment  Sensory Perception: 4-->no impairment  Moisture: 4-->rarely moist  Activity: 3-->walks occasionally  Mobility: 4-->no limitation  Nutrition: 3-->adequate  Friction and Shear: 3-->no apparent problem  Scott Score: 21    Education    Patient has a [place] to Observation order: No  Observation education completed and documented: No       "

## 2023-02-01 NOTE — PLAN OF CARE
Problem: Plan of Care - These are the overarching goals to be used throughout the patient stay.    Goal: Optimal Comfort and Wellbeing  Outcome: Progressing  Goal: Readiness for Transition of Care  Outcome: Progressing     Problem: Intestinal Obstruction  Goal: Optimal Bowel Function  Outcome: Progressing  Intervention: Promote Bowel Function  Recent Flowsheet Documentation  Taken 2/1/2023 0734 by Sue Page RN  Body Position: position changed independently  Head of Bed (HOB) Positioning: HOB at 20-30 degrees  Positioning/Transfer Devices:   pillows   in use  Goal: Fluid and Electrolyte Balance  Outcome: Progressing  Goal: Optimize Nutrition Status  Outcome: Progressing   Goal Outcome Evaluation:

## 2023-02-01 NOTE — PROGRESS NOTES
Skin affirmation note    Admitting nurse completed full skin assessment, Scott score and Scott interventions. This writer agrees with the initial skin assessment findings.      Kiley Garduno RN

## 2023-02-01 NOTE — PLAN OF CARE
Goal Outcome Evaluation:      Plan of Care Reviewed With: patient    Overall Patient Progress: improvingOverall Patient Progress: improving          Problem: Intestinal Obstruction  Goal: Optimal Bowel Function  Outcome: Progressing  Goal: Fluid and Electrolyte Balance  Outcome: Progressing  Goal: Optimize Nutrition Status  Outcome: Progressing     Problem: Nausea and Vomiting  Goal: Nausea and Vomiting Relief  Outcome: Adequate for Care Transition     Problem: Intestinal Obstruction  Goal: Absence of Infection Signs and Symptoms  Outcome: Adequate for Care Transition  Goal: Optimal Pain Control and Function  Outcome: Adequate for Care Transition

## 2023-02-01 NOTE — ED TRIAGE NOTES
Pt with hx of bowel obstruction after surgery in December. Reports ongoing intermittent abdominal pain. Reports lower abdominal pain that started 2 nights ago and has 2 emesis today. Pt reports typical bowel movement yesterday or day before. Pt had a small amount of stool this morning. Denies pain unless pt pushes on abdomen.     Triage Assessment     Row Name 01/31/23 6560       Triage Assessment (Adult)    Airway WDL WDL       Respiratory WDL    Respiratory WDL WDL       Skin Circulation/Temperature WDL    Skin Circulation/Temperature WDL WDL       Cardiac WDL    Cardiac WDL WDL       Peripheral/Neurovascular WDL    Peripheral Neurovascular WDL WDL       Cognitive/Neuro/Behavioral WDL    Cognitive/Neuro/Behavioral WDL WDL

## 2023-02-01 NOTE — UTILIZATION REVIEW
Admission Status; Secondary Review Determination     Admission Date: 1/31/2023  8:40 PM       Under the authority of the Utilization Management Committee, the utilization review process indicated a secondary review on the above patient.  The review outcome is based on review of the medical records, discussions with staff, and applying clinical experience noted on the date of the review.        (x)      Inpatient Status Appropriate - This patient's medical care is consistent with medical management for inpatient care and reasonable inpatient medical practice.       RATIONALE FOR DETERMINATION      Brief clinical presentation, information copied from the chart, abbreviated and edited for relevant content:     Eleazar Cortez is a 67 year old female admitted iwht SBO, recurrent. Currently NPO, on IVF, and no NGT for now as pt has no nausea/vomiting or pain . Gen Surg aware; will follow patient. no prior abdominal surgery; did receive radiation for NHL of left groin/pelvic area in 2002. Not stable for discharge today, admitted 1/31/ 23      At the time of admission with the information available to the attending physician, more than 2 nights hospital complex care was anticipated. Also, there was a risk of adverse outcome if patient was treated outpatient or observation. High intensity of services anticipated. Inpatient admission appropriate based on Medicare guidelines.       The information on this document is developed by the utilization review team in order for the business office to ensure compliance.  This only denotes the appropriateness of proper admission status and does not reflect the quality of care rendered.         The definitions of Inpatient Status and Observation Status used in making the determination above are those provided in the CMS Coverage Manual, Chapter 1 and Chapter 6, section 70.4.      Sincerely,      Stephanie Woody MD   Utilization Review/ Case Management  [facility].

## 2023-02-01 NOTE — PROGRESS NOTES
[facility]    Hospitalist Progress Note    Date of Service (when I saw the patient): 02/01/2023    Assessment & Plan   Eleazar Cortez is a 67 year old female who was admitted on 1/31/2023 with a small bowel obstruction.    Small bowel obstruction  CT scan demonstrates mild distention of multiple small bowel loops with decompression of the distal small bowel.  Mild transition seen in upper pelvis.  Patient reports she had a bowel movement yesterday evening and has not vomited since 5 PM yesterday evening.  -Start clear liquid diet and advance as tolerated  -Colonoscopy as outpatient  -Possible referral to gastroenterology    Hypothyroidism  -Continue levothyroxine    Non-Hodgkin's lymphoma  In remission, diagnosed 2002.  Status post left upper leg lymph node resection and pelvic radiation.    Leukocytosis  WBC 13.7 on presentation.  Now 8.4.  -Resolved    DVT Prophylaxis: Enoxaparin (Lovenox) SQ  Code Status: Full Code    Disposition: Expected discharge in 0-1 days once tolerating low fiber diet.    Tyshawn Mendoza MD    Interval History   Patient complaining of RLQ abdominal pain after eating prior to admission.  Had stool yesterday evening.  Unsure if passing flatus, but denies nausea or distention.    -Data reviewed today: I reviewed all new labs and imaging results over the last 24 hours. I personally reviewed no images or EKG's today.    Physical Exam   Temp: 98  F (36.7  C) Temp src: Oral BP: 118/66 Pulse: 77   Resp: 18 SpO2: 100 % O2 Device: None (Room air)    Vitals:    01/31/23 1814 02/01/23 0408   Weight: 63 kg (139 lb) 63.5 kg (139 lb 15.9 oz)     Vital Signs with Ranges  Temp:  [97.6  F (36.4  C)-98.2  F (36.8  C)] 98  F (36.7  C)  Pulse:  [71-98] 77  Resp:  [16-18] 18  BP: (105-132)/(61-84) 118/66  SpO2:  [91 %-100 %] 100 %  I/O last 3 completed shifts:  In: 141.67 [I.V.:141.67]  Out: -     Gen: Well nourished, well developed, alert and oriented x 3, no acute  distressed  HEENT: Atraumatic, normocephalic; sclera non-injected, anicterric; oral mucosa moist, no lesion, no exudate  Lungs: Clear to ausculation, no wheezes, no rhonchi, no rales  Heart: Regular rate, regular rhythm, no gallops, no rubs, no murmurs  GI: Bowel sound normal, no hepatosplenomegaly, no masses, non-tender, non-distended, no guarding, no rebound tenderness  Lymph: No lymphadenopathy, no edema  Skin: No rashes, no chronic venous stasis     Medications     dextrose 5% and 0.9% NaCl Stopped (02/01/23 0401)       enoxaparin ANTICOAGULANT  40 mg Subcutaneous Q24H     levothyroxine  50 mcg Oral Daily     sodium chloride (PF)  3 mL Intracatheter Q8H       Data   Recent Labs   Lab 02/01/23  0535 01/31/23  1834   WBC 8.4 13.7*   HGB 10.6* 13.2   MCV 97 95    414    139   POTASSIUM 3.9 3.9   CHLORIDE 106 95*   CO2 26 30*   BUN 14.7 15.7   CR 0.83 0.95   ANIONGAP 10 14   ABE 8.5* 10.5*   * 136*   ALBUMIN  --  4.7   PROTTOTAL  --  8.3   BILITOTAL  --  0.4   ALKPHOS  --  58   ALT  --  18   AST  --  30       Recent Results (from the past 24 hour(s))   CT Abdomen Pelvis w/o Contrast    Narrative    EXAM: CT ABDOMEN PELVIS W/O CONTRAST  LOCATION: [facility]  DATE/TIME: 1/31/2023 9:31 PM    INDICATION: Abdominal pain. History of small bowel obstruction.  Last imaging 12/9/2022.  Evaluate for recurrent bowel obstruction versus other acute intra-abdominal process and/or catastrophe.  COMPARISON: 12/09/2022  TECHNIQUE: CT scan of the abdomen and pelvis was performed without IV contrast. Multiplanar reformats were obtained. Dose reduction techniques were used.  CONTRAST: None.    FINDINGS:   LOWER CHEST: Normal.    HEPATOBILIARY: Normal.    PANCREAS: Normal.    SPLEEN: Normal.    ADRENAL GLANDS: Normal.    KIDNEYS/BLADDER: Normal.    BOWEL: There is mild fluid-filled distention of multiple loops of small bowel less pronounced on the prior examination with the distal small  bowel decompressed. Findings compatible with early or partial small bowel obstruction. Transition in the upper   pelvis.    LYMPH NODES: Normal.    VASCULATURE: Unremarkable.    PELVIC ORGANS: Normal.    MUSCULOSKELETAL: Scoliosis.      Impression    IMPRESSION: Mechanical small bowel obstruction again seen, although less pronounced on the prior examination. Mild distention of multiple small bowel with decompression of the distal small bowel. Mild transition seen in the upper pelvis.

## 2023-02-01 NOTE — PROGRESS NOTES
End Of Shift Note      Subjective/Objective:    Neuro: A/Ox4    Cardiac: WNL    Resp: Room air lungs are clear     GI/: Voiding per bathroom, BM noted by patient. +Flauts and active bowel sounds.    Tolerating clears will advance to Full liquid for dinner, tolerated full with toast per patient request.   Denies pain, N/V after eating.     MSK: Up with SBA/independent     Skin: WNL    LDAs: PIVx1 Infusing fluids

## 2023-02-01 NOTE — ED NOTES
"[facility]   Admission Handoff    The patient is Eleazar Cortez, 67 year old who arrived in the ED by WALKED from home with a complaint of Abdominal Pain  . The patient's current symptoms are a recurrence of a past episode and during this time the symptoms have remained the same. In the ED, patient was diagnosed with   Final diagnoses:   Small bowel obstruction (H) - recurring. Last hospital course- 12/9/22   Abnormal CT of the abdomen - Mechanical small bowel obstruction again seen, although less pronounced on the prior examination         Needed?: No    Allergies:    Allergies   Allergen Reactions     Codeine Difficulty breathing     Also felt light headed.  After her arthroscopy 16-18 hour after surgery ( had been taking the Tylenol #3 ) did have an episode with lightheadedness and difficulty breathing/. Also felt like her neck has gotten stiff, couldn't move.  Lasting less than 5 min.   Has been able to take Tylenol since then without problems.       Dye [Contrast Dye] Other (See Comments)     congestion       Past Medical Hx:   Past Medical History:   Diagnosis Date     Arthritis 2014    in knee     BCC (basal cell carcinoma), face 2009    right lateral nose.  removed via Mohs     Hypothyroidism 10/11/2012     Osteoporosis      Other malignant lymphomas, unspecified site, extranodal and solid organ sites 2002    Non-Hodgkin's Lymphoma     Scoliosis (and kyphoscoliosis), idiopathic      Uncomplicated asthma childhood    Rarely... haven't noticed for a long time       Initial vitals were: BP: 113/66  Pulse: 72  Temp: 97.6  F (36.4  C)  Resp: 16  Height: 165.1 cm (5' 5\")  Weight: 63 kg (139 lb)  SpO2: 100 %   Recent vital Signs: /61   Pulse 77   Temp 97.6  F (36.4  C) (Oral)   Resp 16   Ht 1.651 m (5' 5\")   Wt 63 kg (139 lb)   SpO2 92%   BMI 23.13 kg/m      Elimination Status: Continent: Yes     Activity Level: Independent    Fall Status:       Baseline Mental " status: WDL  Current Mental Status changes: at basesline    Infection present or suspected this encounter: no  Sepsis suspected: No    Isolation type: NA    Bariatric equipment needed?: No    In the ED these meds were given:   Medications   sodium chloride 0.9% infusion (1,000 mLs Intravenous Not Given 1/31/23 2306)   HYDROmorphone (PF) (DILAUDID) injection 0.5 mg ( Intravenous Canceled Entry 1/31/23 2133)   lactated ringers infusion ( Intravenous Rate/Dose Verify 2/1/23 0221)   0.9% sodium chloride BOLUS (0 mLs Intravenous Stopped 1/31/23 2306)       Drips running?  No    Home pump  No    Current LDAs: Peripheral IV: Site R wrist; Gauge 20  lactated Ringer's     Results:   Labs/Imaging  Ordered and Resulted from Time of ED Arrival Up to the Time of Departure from the ED  Results for orders placed or performed during the hospital encounter of 01/31/23 (from the past 24 hour(s))   CBC with Platelets & Differential    Narrative    The following orders were created for panel order CBC with Platelets & Differential.  Procedure                               Abnormality         Status                     ---------                               -----------         ------                     CBC with platelets and d...[[id number]]  Abnormal            Final result                 Please view results for these tests on the individual orders.   Comprehensive metabolic panel   Result Value Ref Range    Sodium 139 136 - 145 mmol/L    Potassium 3.9 3.4 - 5.3 mmol/L    Chloride 95 (L) 98 - 107 mmol/L    Carbon Dioxide (CO2) 30 (H) 22 - 29 mmol/L    Anion Gap 14 7 - 15 mmol/L    Urea Nitrogen 15.7 8.0 - 23.0 mg/dL    Creatinine 0.95 0.51 - 0.95 mg/dL    Calcium 10.5 (H) 8.8 - 10.2 mg/dL    Glucose 136 (H) 70 - 99 mg/dL    Alkaline Phosphatase 58 35 - 104 U/L    AST 30 10 - 35 U/L    ALT 18 10 - 35 U/L    Protein Total 8.3 6.4 - 8.3 g/dL    Albumin 4.7 3.5 - 5.2 g/dL    Bilirubin Total 0.4 <=1.2 mg/dL    GFR Estimate 66 >60  mL/min/1.73m2   CBC with platelets and differential   Result Value Ref Range    WBC Count 13.7 (H) 4.0 - 11.0 10e3/uL    RBC Count 4.25 3.80 - 5.20 10e6/uL    Hemoglobin 13.2 11.7 - 15.7 g/dL    Hematocrit 40.5 35.0 - 47.0 %    MCV 95 78 - 100 fL    MCH 31.1 26.5 - 33.0 pg    MCHC 32.6 31.5 - 36.5 g/dL    RDW 14.3 10.0 - 15.0 %    Platelet Count 414 150 - 450 10e3/uL    % Neutrophils 77 %    % Lymphocytes 14 %    % Monocytes 6 %    % Eosinophils 2 %    % Basophils 1 %    % Immature Granulocytes 0 %    NRBCs per 100 WBC 0 <1 /100    Absolute Neutrophils 10.7 (H) 1.6 - 8.3 10e3/uL    Absolute Lymphocytes 1.9 0.8 - 5.3 10e3/uL    Absolute Monocytes 0.8 0.0 - 1.3 10e3/uL    Absolute Eosinophils 0.3 0.0 - 0.7 10e3/uL    Absolute Basophils 0.1 0.0 - 0.2 10e3/uL    Absolute Immature Granulocytes 0.0 <=0.4 10e3/uL    Absolute NRBCs 0.0 10e3/uL   UA with Microscopic reflex to Culture    Specimen: Urine, Midstream   Result Value Ref Range    Color Urine Yellow Colorless, Straw, Light Yellow, Yellow    Appearance Urine Cloudy (A) Clear    Glucose Urine Negative Negative mg/dL    Bilirubin Urine Negative Negative    Ketones Urine 80 (A) Negative mg/dL    Specific Gravity Urine 1.020 1.003 - 1.035    Blood Urine Negative Negative    pH Urine 8.5 (H) 5.0 - 7.0    Protein Albumin Urine 100 (A) Negative mg/dL    Urobilinogen Urine Normal Normal, 2.0 mg/dL    Nitrite Urine Negative Negative    Leukocyte Esterase Urine Negative Negative    Mucus Urine Present (A) None Seen /LPF    Amorphous Crystals Urine Few (A) None Seen /HPF    RBC Urine 5 (H) <=2 /HPF    WBC Urine 2 <=5 /HPF    Squamous Epithelials Urine 1 <=1 /HPF    Hyaline Casts Urine 22 (H) <=2 /LPF    Narrative    Urine Culture not indicated   CT Abdomen Pelvis w/o Contrast    Narrative    EXAM: CT ABDOMEN PELVIS W/O CONTRAST  LOCATION: [facility]  DATE/TIME: 1/31/2023 9:31 PM    INDICATION: Abdominal pain. History of small bowel obstruction.   Last imaging 12/9/2022.  Evaluate for recurrent bowel obstruction versus other acute intra-abdominal process and/or catastrophe.  COMPARISON: 12/09/2022  TECHNIQUE: CT scan of the abdomen and pelvis was performed without IV contrast. Multiplanar reformats were obtained. Dose reduction techniques were used.  CONTRAST: None.    FINDINGS:   LOWER CHEST: Normal.    HEPATOBILIARY: Normal.    PANCREAS: Normal.    SPLEEN: Normal.    ADRENAL GLANDS: Normal.    KIDNEYS/BLADDER: Normal.    BOWEL: There is mild fluid-filled distention of multiple loops of small bowel less pronounced on the prior examination with the distal small bowel decompressed. Findings compatible with early or partial small bowel obstruction. Transition in the upper   pelvis.    LYMPH NODES: Normal.    VASCULATURE: Unremarkable.    PELVIC ORGANS: Normal.    MUSCULOSKELETAL: Scoliosis.      Impression    IMPRESSION: Mechanical small bowel obstruction again seen, although less pronounced on the prior examination. Mild distention of multiple small bowel with decompression of the distal small bowel. Mild transition seen in the upper pelvis.         For the majority of the shift this patient's behavior was Green     Cardiac Rhythm: NA  Pt needs tele? No  Skin/wound Issues: None    Code Status: Full Code    Pain control: good    Nausea control: good    Abnormal labs/tests/findings requiring intervention: CT-mechanical bowel pbstruction    Patient tested for COVID 19 prior to admission: NO     OBS brochure/video discussed/provided to patient/family: N/A     Family present during ED course? Yes     Family Comments/Social Situation comments:     Tasks needing completion: None    Tolu Rojas RN

## 2023-02-01 NOTE — H&P
[facility]    History and Physical - Hospitalist Service       Date of Admission:  1/31/2023    Assessment & Plan    SBO, recurrent  -NPO, cont IVF  -no NGT for now as pt has no nausea/vomiting or pain   -Gen Surg aware; will follow patient  -no prior abdominal surgery; did receive radiation for NHL of left groin/pelvic area in 2002    Hypothyroidism  -cont levothyroxine; TSH wnl    NHL  -in remission; diagnosed 2002  -s/p left upper leg LN resection and pelvic radiation    Leukocytosis  -no obvious infectious process  -repeat CBC in AM       Diet: NPO per Anesthesia Guidelines for Procedure/Surgery Except for: Meds    DVT Prophylaxis: lovenox  Christianson Catheter: Not present  Lines: None     Code Status:  FULL    Clinically Significant Risk Factors Present on Admission                     Disposition Plan      Expected Discharge Date: 02/02/2023                The patient's care was discussed with the patient.      RENAN KING MD  [facility]  Securely message with the Vocera Web Console (learn more here)  Text page via [employer] Paging/Directory      Visit/Communication Style   Virtual (Video) communication was used to evaluate Eleazar.  Eleazar consented to the use of video communication: yes  Video START time: 0420, 2/1/2023  Video STOP time: 0430, 2/1/2023   Patient's location: [facility]   Provider's location during the visit: [facility] Tele-medicine site        ______________________________________________________________________    Chief Complaint   vomiting    History of Present Illness    67yoF with hypothyroidism, OA, h/o NHL (diagonsed 2002), and SBO in 12/2022 presented to the ED with abdominal discomfort and vomiting which began yesterday.  She vomited after lunch yesterday and then again in the late afternoon.  She has not eaten since then.  Her last BM was 10pm last night.  Currently her pain is 0/10.     Review of  Systems    General: negative for fever, chills, sweats, weakness  Eyes: negative for blurred vision, loss of vision  Ear Nose and Throat: negative for pharyngitis, speech or swallowing difficulties  Respiratory:  negative for sputum production, wheezing, MACIAS, pleuritic pain, sob or cough  Cardiology:  negative for chest pain, palpitations, orthopnea, PND, edema, syncope   Gastrointestinal: negative for , diarrhea, constipation, hematemesis, melena or hematochezia  Genitourinary: negative for frequency, urgency, dysuria, hematuria   Neurological: negative for focal weakness, paresthesia    Past Medical History    I have reviewed this patient's medical history and updated it with pertinent information if needed.   Past Medical History:   Diagnosis Date     Arthritis 2014    in knee     BCC (basal cell carcinoma), face 2009    right lateral nose.  removed via Mohs     Hypothyroidism 10/11/2012     Osteoporosis      Other malignant lymphomas, unspecified site, extranodal and solid organ sites 2002    Non-Hodgkin's Lymphoma     Scoliosis (and kyphoscoliosis), idiopathic      Uncomplicated asthma childhood    Rarely... haven't noticed for a long time       Past Surgical History   I have reviewed this patient's surgical history and updated it with pertinent information if needed.  Past Surgical History:   Procedure Laterality Date     ARTHROPLASTY KNEE Left 12/5/2022    Procedure: LEFT TOTAL KNEE ARTHROPLASTY;  Surgeon: Daren Coleman MD;  Location: [facility] OR     BIOPSY  2002, 2011?    leg, nose     COLONOSCOPY  11/09/2012    Procedure: COLONOSCOPY;  Colonoscopy;  Surgeon: Drew Perdue MD;  Location: [facility]     ORTHOPEDIC SURGERY Left 2012, 2014    broke radius by wrist... plate and 9 screws put in, arthrosc     SURGICAL HISTORY OF -   12/2001    Lymph Node Excision on left leg     SURGICAL HISTORY OF -   01/2009    BCC removal right nasal area     ZZC TOTAL KNEE ARTHROPLASTY Right 03/2015  "      Social History   I have reviewed this patient's social history and updated it with pertinent information if needed.  Social History     Tobacco Use     Smoking status: Never     Smokeless tobacco: Never   Vaping Use     Vaping Use: Never used   Substance Use Topics     Alcohol use: No     Drug use: No       Family History   I have reviewed this patient's family history and updated it with pertinent information if needed.  Family History   Problem Relation Age of Onset     C.A.D. Father         heart problems at age 40     Arthritis Father         bilat knee replacements \"bowlegged\"     Coronary Artery Disease Father         heart attack 43 years ago     Asthma Father      Lipids Mother      Arthritis Mother         knees/fingers     Hypertension Mother      Other Cancer Mother         uterine cancer, 2016     Cardiovascular Paternal Grandfather      Coronary Artery Disease Paternal Grandfather          of heart attack     Neurologic Disorder Maternal Grandfather         park     Diabetes Maternal Aunt      Osteoporosis Paternal Grandmother      Arthritis Son         spondylolysis     Coronary Artery Disease Other         Congestive heart failure     Asthma Other         paternal aunt, adult onset     Asthma Other         paternal aunt       Prior to Admission Medications   Prior to Admission Medications   Prescriptions Last Dose Informant Patient Reported? Taking?   Calcium Citrate-Vitamin D (CALCIUM + D PO)   Yes No   Sig: Take 1 tablet by mouth daily   IBANdronate (BONIVA) 150 MG tablet   No No   Sig: Take 1 tablet (150 mg) by mouth every 30 days   UNABLE TO FIND   Yes No   Sig: MEDICATION NAME: Melaleuca PM - Longevity  2 CardiOmegaEPA (Omega 3 Fishoils - 1000 mg EPG and 100 mg DHA)  2 Vitality Multivitamin and Mineral (Iron, Vitamin K, Folic acid, Biotin, Calcium)  2 CellWise Antioxidant (Olive extract, grape seed extract, Vitamin C, carotenoids)  2 ProveXCV (Blood pressure support)  2 RecoverAl " (Inflammation)  1 Florify (acid resistance, probiotic)  -1 Vitality [place] (omega 3, Brain eye and heart)  -2 Acuity [place] (Brain health)  -1 K2-03 (Redirects calcium bone health)  -1Replenex Extra strength   -1 Nutraview (Vision- macular/lens health)  1 CoQ10 +Cellular Energy Support (antioxidants)   acetaminophen (TYLENOL) 500 MG tablet   Yes No   Sig: Take 500-1,000 mg by mouth every 6 hours as needed for mild pain   aspirin (ASA) 325 MG EC tablet   No No   Sig: Take 1 tablet (325 mg) by mouth daily   ibuprofen (ADVIL/MOTRIN) 200 MG tablet   Yes No   Sig: Take 600 mg by mouth every 8 hours as needed for pain   levothyroxine (SYNTHROID/LEVOTHROID) 50 MCG tablet   No No   Sig: Take 1 tablet (50 mcg) by mouth daily   methocarbamol (ROBAXIN) 500 MG tablet   Yes No   Sig: Take 500 mg by mouth 4 times daily as needed for muscle spasms   oxyCODONE (ROXICODONE) 5 MG tablet   Yes No   Sig: Take 5 mg by mouth every 6 hours as needed for severe pain (7-10)   senna-docusate (SENOKOT-S/PERICOLACE) 8.6-50 MG tablet Past Week  No Yes   Sig: Take 1-2 tablets by mouth 2 times daily Take while on oral narcotics to prevent or treat constipation.      Facility-Administered Medications: None     Allergies   Allergies   Allergen Reactions     Codeine Difficulty breathing     Also felt light headed.  After her arthroscopy 16-18 hour after surgery ( had been taking the Tylenol #3 ) did have an episode with lightheadedness and difficulty breathing/. Also felt like her neck has gotten stiff, couldn't move.  Lasting less than 5 min.   Has been able to take Tylenol since then without problems.       Dye [Contrast Dye] Other (See Comments)     congestion       Physical Exam   Vital Signs: Temp: 97.6  F (36.4  C) Temp src: Oral BP: 117/61 Pulse: 77   Resp: 16 SpO2: 92 % O2 Device: None (Room air)    Weight: 139 lbs 0 oz    Gen:  Well-developed, well-nourished, in no acute distress, lying semi-supine in hospital stretcher  HEENT:  Anicteric  sclera, PER, hearing intact to voice  Resp:  No accessory muscle use, breath sounds clear; no wheezes no rales no rhonchi  Card:  No murmur, normal S1, S2   Abd:  Soft per RN exam, no TTP, non-distended, normoactive bowel sounds are present  Musc:  Normal strength and movement of the major muscle groups without obvious deformity  Psych:  Good insight, oriented to person, place and time, not anxious, not agitated    Data     Recent Labs   Lab 01/31/23  1834   WBC 13.7*   HGB 13.2   MCV 95         POTASSIUM 3.9   CHLORIDE 95*   CO2 30*   BUN 15.7   CR 0.95   ANIONGAP 14   ABE 10.5*   *   ALBUMIN 4.7   PROTTOTAL 8.3   BILITOTAL 0.4   ALKPHOS 58   ALT 18   AST 30         Recent Results (from the past 24 hour(s))   CT Abdomen Pelvis w/o Contrast    Narrative    EXAM: CT ABDOMEN PELVIS W/O CONTRAST  LOCATION: [facility]  DATE/TIME: 1/31/2023 9:31 PM    INDICATION: Abdominal pain. History of small bowel obstruction.  Last imaging 12/9/2022.  Evaluate for recurrent bowel obstruction versus other acute intra-abdominal process and/or catastrophe.  COMPARISON: 12/09/2022  TECHNIQUE: CT scan of the abdomen and pelvis was performed without IV contrast. Multiplanar reformats were obtained. Dose reduction techniques were used.  CONTRAST: None.    FINDINGS:   LOWER CHEST: Normal.    HEPATOBILIARY: Normal.    PANCREAS: Normal.    SPLEEN: Normal.    ADRENAL GLANDS: Normal.    KIDNEYS/BLADDER: Normal.    BOWEL: There is mild fluid-filled distention of multiple loops of small bowel less pronounced on the prior examination with the distal small bowel decompressed. Findings compatible with early or partial small bowel obstruction. Transition in the upper   pelvis.    LYMPH NODES: Normal.    VASCULATURE: Unremarkable.    PELVIC ORGANS: Normal.    MUSCULOSKELETAL: Scoliosis.      Impression    IMPRESSION: Mechanical small bowel obstruction again seen, although less pronounced on the prior  examination. Mild distention of multiple small bowel with decompression of the distal small bowel. Mild transition seen in the upper pelvis.

## 2023-02-02 VITALS
TEMPERATURE: 98.2 F | DIASTOLIC BLOOD PRESSURE: 67 MMHG | SYSTOLIC BLOOD PRESSURE: 121 MMHG | HEART RATE: 77 BPM | HEIGHT: 65 IN | WEIGHT: 144.84 LBS | RESPIRATION RATE: 16 BRPM | BODY MASS INDEX: 24.13 KG/M2 | OXYGEN SATURATION: 94 %

## 2023-02-02 LAB
ANION GAP SERPL CALCULATED.3IONS-SCNC: 10 MMOL/L (ref 7–15)
BUN SERPL-MCNC: 6.6 MG/DL (ref 8–23)
CALCIUM SERPL-MCNC: 8.4 MG/DL (ref 8.8–10.2)
CHLORIDE SERPL-SCNC: 109 MMOL/L (ref 98–107)
CREAT SERPL-MCNC: 0.72 MG/DL (ref 0.51–0.95)
DEPRECATED HCO3 PLAS-SCNC: 23 MMOL/L (ref 22–29)
ERYTHROCYTE [DISTWIDTH] IN BLOOD BY AUTOMATED COUNT: 14.2 % (ref 10–15)
GFR SERPL CREATININE-BSD FRML MDRD: >90 ML/MIN/1.73M2
GLUCOSE SERPL-MCNC: 100 MG/DL (ref 70–99)
HCT VFR BLD AUTO: 32.6 % (ref 35–47)
HGB BLD-MCNC: 10.5 G/DL (ref 11.7–15.7)
MCH RBC QN AUTO: 31.3 PG (ref 26.5–33)
MCHC RBC AUTO-ENTMCNC: 32.2 G/DL (ref 31.5–36.5)
MCV RBC AUTO: 97 FL (ref 78–100)
PLATELET # BLD AUTO: 303 10E3/UL (ref 150–450)
POTASSIUM SERPL-SCNC: 4 MMOL/L (ref 3.4–5.3)
RBC # BLD AUTO: 3.35 10E6/UL (ref 3.8–5.2)
SODIUM SERPL-SCNC: 142 MMOL/L (ref 136–145)
WBC # BLD AUTO: 6.4 10E3/UL (ref 4–11)

## 2023-02-02 PROCEDURE — 85027 COMPLETE CBC AUTOMATED: CPT | Performed by: INTERNAL MEDICINE

## 2023-02-02 PROCEDURE — 80048 BASIC METABOLIC PNL TOTAL CA: CPT | Performed by: INTERNAL MEDICINE

## 2023-02-02 PROCEDURE — 99239 HOSP IP/OBS DSCHRG MGMT >30: CPT | Performed by: INTERNAL MEDICINE

## 2023-02-02 PROCEDURE — 36415 COLL VENOUS BLD VENIPUNCTURE: CPT | Performed by: INTERNAL MEDICINE

## 2023-02-02 PROCEDURE — 250N000013 HC RX MED GY IP 250 OP 250 PS 637: Performed by: INTERNAL MEDICINE

## 2023-02-02 PROCEDURE — 250N000013 HC RX MED GY IP 250 OP 250 PS 637: Performed by: EMERGENCY MEDICINE

## 2023-02-02 RX ORDER — IBUPROFEN 400 MG/1
400 TABLET, FILM COATED ORAL EVERY 6 HOURS PRN
Status: DISCONTINUED | OUTPATIENT
Start: 2023-02-02 | End: 2023-02-02 | Stop reason: HOSPADM

## 2023-02-02 RX ADMIN — IBUPROFEN 400 MG: 400 TABLET ORAL at 04:52

## 2023-02-02 RX ADMIN — LEVOTHYROXINE SODIUM 50 MCG: 0.05 TABLET ORAL at 08:32

## 2023-02-02 ASSESSMENT — ACTIVITIES OF DAILY LIVING (ADL)
ADLS_ACUITY_SCORE: 20

## 2023-02-02 NOTE — DISCHARGE SUMMARY
[facility]  Hospitalist Discharge Summary       Date of Admission:  1/31/2023  Date of Discharge:  2/2/2023 12:54 PM  Discharging Provider: Tyshawn Mendoza MD      Discharge Diagnoses     Small bowel obstruction  Hypothyroidism  Non-Hodgkin's lymphoma  Leukocytosis    Follow-ups Needed After Discharge   Follow-up Appointments     Follow-up and recommended labs and tests      Follow up with primary care provider, Cheryl Serrano, within 7 days   for hospital follow- up.  The following labs/tests are recommended: BMP   and CBC.           Discharge Disposition   Discharged to home  Condition at discharge: Stable    Hospital Course   Eleazar Cortez is a 67 year old female who was admitted on 1/31/2023 with a small bowel obstruction.    Small bowel obstruction  CT scan demonstrates mild distention of multiple small bowel loops with decompression of the distal small bowel.  Mild transition seen in upper pelvis.  Patient reports she had a bowel movement yesterday evening and has not vomited since 5 PM yesterday evening.  -Start clear liquid diet and advance as tolerated  -Colonoscopy as outpatient with general surgery  -Outpatient referral to gastroenterology  -Tolerating low fiber diet at time of discharge    Hypothyroidism  -Continue levothyroxine    Non-Hodgkin's lymphoma  In remission, diagnosed 2002.  Status post left upper leg lymph node resection and pelvic radiation.    Leukocytosis  WBC 13.7 on presentation.  Now 8.4.  -Resolved    Consultations This Hospital Stay   SURGERY GENERAL IP CONSULT    Code Status   Full Code    Time Spent on this Encounter   I, Tyshawn Mendoza MD, personally saw the patient today and spent greater than 30 minutes discharging this patient.       MD [facility]  ______________________________________________________________________    Physical Exam   Vital Signs: Temp: 98.2  F (36.8  C) Temp src: Oral BP:  121/67 Pulse: 77   Resp: 16 SpO2: 94 % O2 Device: None (Room air)    Weight: 144 lbs 13.48 oz       Gen: Well nourished, well developed, alert and oriented x 3, no acute distressed  HEENT: Atraumatic, normocephalic; sclera non-injected, anicterric; oral mucosa moist, no lesion, no exudate  Lungs: Clear to ausculation, no wheezes, no rhonchi, no rales  Heart: Regular rate, regular rhythm, no gallops, no rubs, no murmurs  GI: Bowel sound normal, no hepatosplenomegaly, no masses, non-tender, non-distended, no guarding, no rebound tenderness  Lymph: No lymphadenopathy, no edema  Skin: No rashes, no chronic venous stasis     Primary Care Physician   Cheryl Serrano    Discharge Orders      Adult GI  Referral - Consult Only      Reason for your hospital stay    This is a 67 year old female admitted with a small bowel obstruction.     Follow-up and recommended labs and tests    Follow up with primary care provider, hCeryl Serrano, within 7 days for hospital follow- up.  The following labs/tests are recommended: BMP and CBC.     Activity    Your activity upon discharge: activity as tolerated     Diet    Follow this diet upon discharge: Orders Placed This Encounter      Low Fiber Diet       Significant Results and Procedures   Most Recent 3 CBC's:Recent Labs   Lab Test 02/02/23  0604 02/01/23  0535 01/31/23  1834   WBC 6.4 8.4 13.7*   HGB 10.5* 10.6* 13.2   MCV 97 97 95    327 414     Most Recent 3 BMP's:Recent Labs   Lab Test 02/02/23  0604 02/01/23  0535 01/31/23  1834    142 139   POTASSIUM 4.0 3.9 3.9   CHLORIDE 109* 106 95*   CO2 23 26 30*   BUN 6.6* 14.7 15.7   CR 0.72 0.83 0.95   ANIONGAP 10 10 14   ABE 8.4* 8.5* 10.5*   * 110* 136*   ,   Results for orders placed or performed during the hospital encounter of 01/31/23   CT Abdomen Pelvis w/o Contrast    Narrative    EXAM: CT ABDOMEN PELVIS W/O CONTRAST  LOCATION: [facility]  DATE/TIME: 1/31/2023 9:31  PM    INDICATION: Abdominal pain. History of small bowel obstruction.  Last imaging 12/9/2022.  Evaluate for recurrent bowel obstruction versus other acute intra-abdominal process and/or catastrophe.  COMPARISON: 12/09/2022  TECHNIQUE: CT scan of the abdomen and pelvis was performed without IV contrast. Multiplanar reformats were obtained. Dose reduction techniques were used.  CONTRAST: None.    FINDINGS:   LOWER CHEST: Normal.    HEPATOBILIARY: Normal.    PANCREAS: Normal.    SPLEEN: Normal.    ADRENAL GLANDS: Normal.    KIDNEYS/BLADDER: Normal.    BOWEL: There is mild fluid-filled distention of multiple loops of small bowel less pronounced on the prior examination with the distal small bowel decompressed. Findings compatible with early or partial small bowel obstruction. Transition in the upper   pelvis.    LYMPH NODES: Normal.    VASCULATURE: Unremarkable.    PELVIC ORGANS: Normal.    MUSCULOSKELETAL: Scoliosis.      Impression    IMPRESSION: Mechanical small bowel obstruction again seen, although less pronounced on the prior examination. Mild distention of multiple small bowel with decompression of the distal small bowel. Mild transition seen in the upper pelvis.         Discharge Medications   Current Discharge Medication List      CONTINUE these medications which have NOT CHANGED    Details   acetaminophen (TYLENOL) 500 MG tablet Take 500-1,000 mg by mouth every 6 hours as needed for mild pain      Calcium Citrate-Vitamin D (CALCIUM + D PO) Take 1 tablet by mouth daily      IBANdronate (BONIVA) 150 MG tablet Take 1 tablet (150 mg) by mouth every 30 days  Qty: 3 tablet, Refills: 3    Associated Diagnoses: Osteoporosis without current pathological fracture, unspecified osteoporosis type      ibuprofen (ADVIL/MOTRIN) 200 MG tablet Take 600 mg by mouth every 8 hours as needed for pain      levothyroxine (SYNTHROID/LEVOTHROID) 50 MCG tablet Take 1 tablet (50 mcg) by mouth daily  Qty: 90 tablet, Refills: 3     Associated Diagnoses: Hypothyroidism, unspecified type      senna-docusate (SENOKOT-S/PERICOLACE) 8.6-50 MG tablet Take 1-2 tablets by mouth 2 times daily Take while on oral narcotics to prevent or treat constipation.  Qty: 30 tablet, Refills: 0    Comments: While taking narcotics  Associated Diagnoses: History of total knee replacement, left      UNABLE TO FIND MEDICATION NAME: Melaleuca PM - Longevity  2 CardiOmegaEPA (Omega 3 Fishoils - 1000 mg EPG and 100 mg DHA)  2 Vitality Multivitamin and Mineral (Iron, Vitamin K, Folic acid, Biotin, Calcium)  2 CellWise Antioxidant (Olive extract, grape seed extract, Vitamin C, carotenoids)  2 ProveXCV (Blood pressure support)  2 RecoverAl (Inflammation)  1 Florify (acid resistance, probiotic)  -1 Vitality [place] (omega 3, Brain eye and heart)  -2 Acuity [place] (Brain health)  -1 K2-03 (Redirects calcium bone health)  -1Replenex Extra strength   -1 Nutraview (Vision- macular/lens health)  1 CoQ10 +Cellular Energy Support (antioxidants)         STOP taking these medications       aspirin (ASA) 325 MG EC tablet Comments:   Reason for Stopping:             Allergies   Allergies   Allergen Reactions     Codeine Difficulty breathing     Also felt light headed.  After her arthroscopy 16-18 hour after surgery ( had been taking the Tylenol #3 ) did have an episode with lightheadedness and difficulty breathing/. Also felt like her neck has gotten stiff, couldn't move.  Lasting less than 5 min.   Has been able to take Tylenol since then without problems.       Dye [Contrast Dye] Other (See Comments)     congestion

## 2023-02-02 NOTE — PROGRESS NOTES
[facility] DISCHARGE NOTE    Patient discharged to home at 12:54 PM via ambulation. Accompanied by spouse and staff. Discharge instructions reviewed with patient, opportunity offered to ask questions. Prescriptions - None ordered for discharge. All belongings sent with patient.    Leta Madera RN

## 2023-02-02 NOTE — PROGRESS NOTES
Pt has remained pain, nausea free, abd soft, passing flatus. Tolerating low fiber diet, plan to discharge

## 2023-02-03 ENCOUNTER — PATIENT OUTREACH (OUTPATIENT)
Dept: CARE COORDINATION | Facility: CLINIC | Age: 68
End: 2023-02-03
Payer: COMMERCIAL

## 2023-02-03 ENCOUNTER — TELEPHONE (OUTPATIENT)
Dept: GASTROENTEROLOGY | Facility: CLINIC | Age: 68
End: 2023-02-03
Payer: COMMERCIAL

## 2023-02-03 NOTE — TELEPHONE ENCOUNTER
"    Screening Questions  BLUE  KIND OF PREP RED  LOCATION [review exclusion criteria] GREEN  SEDATION TYPE        Y Are you active on mychart?       CHAPITO PAEZ Ordering/Referring Provider?        BCBS MEDICARE What type of coverage do you have?      N Have you had a positive covid test in the last 14 days?     24.0 1. BMI  [BMI 40+ - review exclusion criteria]    Y  2. Are you able to give consent for your medical care? [IF NO,RN REVIEW]          N  3. Are you taking any prescription pain medications on a routine schedule   (ex narcotics: tramadol, oxycodone, roxicodone, oxycontin,  and percocet)?          3a. EXTENDED PREP What kind of prescription?     N 4. Do you have any chemical dependencies such as alcohol, street drugs, or methadone?        **If yes 3- 5 , please schedule with MAC sedation.**          IF YES TO ANY 6 - 10 - HOSPITAL SETTING ONLY.     N 6.   Do you need assistance transferring?     N 7.   Have you had a heart or lung transplant?    N 8.   Are you currently on dialysis?   N 9.   Do you use daily home oxygen?   N 10. Do you take nitroglycerin?   10a.  If yes, how often?     11. [FEMALES]   Are you currently pregnant?    11a.  If yes, how many weeks? [ Greater than 12 weeks, OR NEEDED]    N 12. Do you have Pulmonary Hypertension? *NEED PAC APPT AT [facility]*     N 13. [review exclusion criteria]  Do you have any implantable devices in your body (pacemaker, defib, LVAD)?    N 14. In the past 6 months, have you had any heart related issues including cardiomyopathy or heart attack?     14a.  If yes, did it require cardiac stenting if so when?     N 15. Have you had a stroke or Transient ischemic attack (TIA - aka  mini stroke ) within 6 months?      N 16. Do you have mod to severe Obstructive Sleep Apnea?  [Hospital only]    N 17. Do you have SEVERE AND UNCONTROLLED asthma? *NEED PAC APPT AT [facility]*     N 18. Are you currently taking any blood thinners?     18a. If yes, inform patient to \"follow " "up w/ ordering provider for bridging instructions.\"    N 19. Do you take the medication Phentermine?    19a. If yes, \"Hold for 7 days before procedure.  Please consult your prescribing provider if you have questions about holding this medication.\"     N  20. Do you have chronic kidney disease?      N  21. Do you have a diagnosis of diabetes?     N  22. On a regular basis do you go 3-5 days between bowel movements?      23. Preferred LOCAL Pharmacy for Pre Prescription    [ LIST ONLY ONE PHARMACY]     [facility] [address] [place] [address] [address]        - CLOSING REMINDERS -    Informed patient they will need an adult    Cannot take any type of public or medical transportation alone    Conscious Sedation- Needs  for 6 hours after the procedure       MAC/General-Needs  for 24 hours after procedure    Pre-Procedure Covid test to be completed [[facility] PCR Testing Required]    Confirmed Nurse will call to complete assessment       - SCHEDULING DETAILS -  NO Hospital Setting Required? If yes, what is the exclusion?:    CALOS  Surgeon    3/16  Date of Procedure  Lower Endoscopy [Colonoscopy]  Type of Procedure Scheduled  [facility]   STANDARD [facility]-If you answer yes to questions #8, #20, #21Which Colonoscopy Prep was Sent?     CS Sedation Type     NO PAC / Pre-op Required                 "

## 2023-02-03 NOTE — TELEPHONE ENCOUNTER
Caller:   Reason for Reschedule/Cancellation (please be detailed, any staff messages or encounters to note?): WANTED SOONER APPT      Prior to reschedule please review:    Ordering Provider:CHAPITO PAEZ    Sedation per order:CS    Does patient have any ASC Exclusions, please identify?:       Notes on Cancelled Procedure:    Procedure:Lower Endoscopy [Colonoscopy]     Date: 3/31    Location:[facility]; [address] [place] [address]    Surgeon: JUVE        Rescheduled: Yes    Procedure: Lower Endoscopy [Colonoscopy]     Date: 3/16    Location:[facility]; [address] [place] [address]    Surgeon: CALOS    Sedation Level Scheduled  CS,  Reason for Sedation Level NONE    Prep/Instructions updated and sent: YES

## 2023-02-03 NOTE — PROGRESS NOTES
"[facility] offered Clinic Care Coordination to an established [facility] eligible patient and patient declined CCC at this time.    Clinic Care Coordination Contact  [facility]: Post-Discharge Note  SITUATION                                                      Admission:    Admission Date: 01/31/23   Reason for Admission: Abdominal pain  Discharge:   Discharge Date: 02/02/23  Discharge Diagnosis: Small bowel obstruction, Hypothyroidism, Non-Hodgkin's lymphoma, Leukocytosis    BACKGROUND                                                      Per hospital discharge summary and inpatient provider notes:     Eleazar Cortez is a 67 year old female who presents with report of abdominal pain similar to when she was hospitalized for small bowel obstruction a month prior.     Medical records show prior diagnosis of non-Hodgkin's lymphoma in remission, history of osteoporosis and hypothyroidism.  On examination patient was accompanied by her  William reporting that she developed nausea today and was concerned that she had a new obstruction.  She reports since her hospital discharge a month prior she had still had some abdominal discomfort.  She tried to eat lunch today without relief although she had some while in triage reports that she is feeling some improvement.  She said no fever or chills.  She reports no back pain or flank pain and no urinary symptoms.  With ongoing symptoms through the day she presents for further care.    ASSESSMENT      Discharge Assessment  How are you doing now that you are home?: \"I'm actually doing actually really well this well this morning. Last night my stomach was still in pain, I wasn't sure if I ate too much...I laid on my left side I wasn't sure if it was gas or something. In the middle of the night it was hurting but this morning it feels much better. I had a low-fiber breakfast this morning and had a small BM this morning. So I'm feeling much better today.\"  How are your symptoms? (Red Flag " symptoms escalate to triage hotline per guidelines): Improved  Do you feel your condition is stable enough to be safe at home until your provider visit?: Yes  Does the patient have their discharge instructions? : Yes  Does the patient have questions regarding their discharge instructions? : No  Were you started on any new medications or were there changes to any of your previous medications? : No  Does the patient have all of their medications?: Yes  Do you have questions regarding any of your medications? : No  Do you have all of your needed medical supplies or equipment (DME)?  (i.e. oxygen tank, CPAP, cane, etc.): Yes  Discharge follow-up appointment scheduled within 14 calendar days? : Yes  Discharge Follow Up Appointment Date: 02/16/23  Discharge Follow Up Appointment Scheduled with?: Primary Care Provider    Post-op ([facility] Only)  If the patient had a surgery or procedure, do they have any questions for a nurse?: No    PLAN                                                      Outpatient Plan:      Follow-up and recommended labs and tests  Follow up with primary care provider, Cheryl Serrano, within 7 days  for hospital follow- up. The following labs/tests are recommended:  BMP and CBC.    Future Appointments   Date Time Provider Department Center   2/16/2023 11:30 AM Cheryl Serrano PA-C [facility]         For any urgent concerns, please contact our 24 hour nurse triage line: [phone] ([phone])         CARLY Beltran  [phone]  [facility]

## 2023-02-16 ENCOUNTER — VIRTUAL VISIT (OUTPATIENT)
Dept: FAMILY MEDICINE | Facility: CLINIC | Age: 68
End: 2023-02-16
Payer: COMMERCIAL

## 2023-02-16 DIAGNOSIS — C85.9A LYMPHOMA IN REMISSION: ICD-10-CM

## 2023-02-16 DIAGNOSIS — E03.9 HYPOTHYROIDISM, UNSPECIFIED TYPE: ICD-10-CM

## 2023-02-16 DIAGNOSIS — C44.91 SUPERFICIAL BASAL CELL CARCINOMA: ICD-10-CM

## 2023-02-16 DIAGNOSIS — Z96.652 S/P TOTAL KNEE REPLACEMENT, LEFT: ICD-10-CM

## 2023-02-16 DIAGNOSIS — K56.609 SBO (SMALL BOWEL OBSTRUCTION) (H): Primary | ICD-10-CM

## 2023-02-16 PROBLEM — M17.12 ARTHRITIS OF LEFT KNEE: Status: ACTIVE | Noted: 2021-11-16

## 2023-02-16 PROBLEM — M19.90 DJD (DEGENERATIVE JOINT DISEASE): Status: ACTIVE | Noted: 2023-02-16

## 2023-02-16 PROCEDURE — 99213 OFFICE O/P EST LOW 20 MIN: CPT | Mod: VID | Performed by: PHYSICIAN ASSISTANT

## 2023-02-16 NOTE — PROGRESS NOTES
Eleazar is a 67 year old who is being evaluated via a billable video visit.      How would you like to obtain your AVS? MyChart  If the video visit is dropped, the invitation should be resent by: Text to cell phone: [phone]  Will anyone else be joining your video visit? No        Assessment & Plan     SBO (small bowel obstruction) (H)  She has recently been hospitalized twice in the past 3 months due to SBO.  Currently she is asymptomatic and just got back from a trip.  Following a low fiber diet and has a f/u with GI in 5 days.    Has routine screening colonoscopy scheduled in approximately 1 month.     Reviewed hospital records, due to recheck CBC and BMP as outpatient (orders placed).   - Basic metabolic panel  (Ca, Cl, CO2, Creat, Gluc, K, Na, BUN); Future  - CBC with platelets; Future    Lymphoma in remission (H)  In remission    S/P total knee replacement, left  Knee replacement triggered first SBO.     Hypothyroidism, unspecified type  Thyroid levels reviewed from hospital and within normal range.         20 minutes spent on the date of the encounter doing chart review, history and exam, documentation and further activities per the note     MED REC REQUIRED  Post Medication Reconciliation Status: discharge medications reconciled, continue medications without change      Return in about 4 days (around 2/20/2023) for GI specialist appt (ok to do labs at that time, orders placed).    Cheryl Serrano PA-C  [facility] JACIEL Bush is a 67 year old, presenting for the following health issues:  Hospital F/U      HPI     1st SBO was after her knee replacement.  She had an NG tube placed during the first episode.     Recently, she felt similar symptoms and went in earlier this time (it was not as severe).  Did not need an NG tube this visit and clear liquid diet started.     She has been feeling much better and having BM every day.  Following lower fiber diet (she thinks it may have  "been the increase in fiber that prompted her 2nd episode).     Has f/u colonoscopy in about a month (routine screening).     Has f/u with GI specialist on Monday 2/20/23.       Post Discharge Outreach 2/3/2023   Admission Date 1/31/2023   Reason for Admission Abdominal pain   Discharge Date 2/2/2023   Discharge Diagnosis Small bowel obstruction, Hypothyroidism, Non-Hodgkin's lymphoma, Leukocytosis   How are you doing now that you are home? \"I'm actually doing actually really well this well this morning. Last night my stomach was still in pain, I wasn't sure if I ate too much...I laid on my left side I wasn't sure if it was gas or something. In the middle of the night it was hurting but this morning it feels much better. I had a low-fiber breakfast this morning and had a small BM this morning. So I'm feeling much better today.\"   How are your symptoms? (Red Flag symptoms escalate to triage hotline per guidelines) Improved   Do you feel your condition is stable enough to be safe at home until your provider visit? Yes   Does the patient have their discharge instructions?  Yes   Does the patient have questions regarding their discharge instructions?  No   Were you started on any new medications or were there changes to any of your previous medications?  No   Does the patient have all of their medications? Yes   Do you have questions regarding any of your medications?  No   Do you have all of your needed medical supplies or equipment (DME)?  (i.e. oxygen tank, CPAP, cane, etc.) Yes   Discharge follow-up appointment scheduled within 14 calendar days?  Yes   Discharge Follow Up Appointment Date 2/16/2023   Discharge Follow Up Appointment Scheduled with? Primary Care Provider     Hospital Follow-up Visit:    Hospital/Nursing Home/IP Rehab Facility: [facility]  Date of Admission: 01/31/23  Date of Discharge: 02/02/23  Reason(s) for Admission: Abdominal pain and small bowel obstruction. CT scan " revealed mild distention of multiple small bowel loops with decompression of the distal small bowel.  Mild transition seen in upper pelvis.     Patient doing great since discharge, passing normal stools every day now and no longer having abdominal pains.     Was your hospitalization related to COVID-19? No   Problems taking medications regularly:  None  Medication changes since discharge: None  Problems adhering to non-medication therapy:  None    Summary of hospitalization:  [facility] discharge summary reviewed  Diagnostic Tests/Treatments reviewed.  Follow up needed: GI specialist  Other Healthcare Providers Involved in Patient s Care:         Specialist appointment - 2/20/23  Update since discharge: improved.   Plan of care communicated with patient         Review of Systems   Constitutional, HEENT, cardiovascular, pulmonary, gi and gu systems are negative, except as otherwise noted.      Objective           Vitals:  No vitals were obtained today due to virtual visit.    Physical Exam   GENERAL: Healthy, alert and no distress  EYES: Eyes grossly normal to inspection.  No discharge or erythema, or obvious scleral/conjunctival abnormalities.  RESP: No audible wheeze, cough, or visible cyanosis.  No visible retractions or increased work of breathing.    SKIN: Visible skin clear. No significant rash, abnormal pigmentation or lesions.  NEURO: Cranial nerves grossly intact.  Mentation and speech appropriate for age.  PSYCH: Mentation appears normal, affect normal/bright, judgement and insight intact, normal speech and appearance well-groomed.                Video-Visit Details    Type of service:  Video Visit   Video Start Time: 11:20 AM  Video End Time:11:35 AM    Originating Location (pt. Location): Home  Distant Location (provider location):  On-site  Platform used for Video Visit: [employer]

## 2023-02-20 ENCOUNTER — HOSPITAL ENCOUNTER (OUTPATIENT)
Dept: GENERAL RADIOLOGY | Facility: CLINIC | Age: 68
Discharge: HOME | End: 2023-02-20
Attending: NURSE PRACTITIONER | Admitting: NURSE PRACTITIONER
Payer: COMMERCIAL

## 2023-02-20 ENCOUNTER — OFFICE VISIT (OUTPATIENT)
Dept: GASTROENTEROLOGY | Facility: CLINIC | Age: 68
End: 2023-02-20
Attending: INTERNAL MEDICINE
Payer: COMMERCIAL

## 2023-02-20 VITALS
SYSTOLIC BLOOD PRESSURE: 120 MMHG | DIASTOLIC BLOOD PRESSURE: 68 MMHG | WEIGHT: 134.9 LBS | HEIGHT: 65 IN | BODY MASS INDEX: 22.48 KG/M2

## 2023-02-20 DIAGNOSIS — K56.609 SBO (SMALL BOWEL OBSTRUCTION) (H): ICD-10-CM

## 2023-02-20 DIAGNOSIS — K59.00 CONSTIPATION, UNSPECIFIED CONSTIPATION TYPE: Primary | ICD-10-CM

## 2023-02-20 DIAGNOSIS — K59.00 CONSTIPATION, UNSPECIFIED CONSTIPATION TYPE: ICD-10-CM

## 2023-02-20 LAB
ANION GAP SERPL CALCULATED.3IONS-SCNC: 10 MMOL/L (ref 7–15)
BUN SERPL-MCNC: 13.1 MG/DL (ref 8–23)
CALCIUM SERPL-MCNC: 9.9 MG/DL (ref 8.8–10.2)
CHLORIDE SERPL-SCNC: 100 MMOL/L (ref 98–107)
CREAT SERPL-MCNC: 0.81 MG/DL (ref 0.51–0.95)
DEPRECATED HCO3 PLAS-SCNC: 30 MMOL/L (ref 22–29)
ERYTHROCYTE [DISTWIDTH] IN BLOOD BY AUTOMATED COUNT: 13.8 % (ref 10–15)
GFR SERPL CREATININE-BSD FRML MDRD: 80 ML/MIN/1.73M2
GLUCOSE SERPL-MCNC: 92 MG/DL (ref 70–99)
HCT VFR BLD AUTO: 37.8 % (ref 35–47)
HGB BLD-MCNC: 12.2 G/DL (ref 11.7–15.7)
MCH RBC QN AUTO: 31 PG (ref 26.5–33)
MCHC RBC AUTO-ENTMCNC: 32.3 G/DL (ref 31.5–36.5)
MCV RBC AUTO: 96 FL (ref 78–100)
PLATELET # BLD AUTO: 316 10E3/UL (ref 150–450)
POTASSIUM SERPL-SCNC: 4.4 MMOL/L (ref 3.4–5.3)
RBC # BLD AUTO: 3.94 10E6/UL (ref 3.8–5.2)
SODIUM SERPL-SCNC: 140 MMOL/L (ref 136–145)
WBC # BLD AUTO: 5.8 10E3/UL (ref 4–11)

## 2023-02-20 PROCEDURE — 74019 RADEX ABDOMEN 2 VIEWS: CPT

## 2023-02-20 PROCEDURE — 99204 OFFICE O/P NEW MOD 45 MIN: CPT | Performed by: NURSE PRACTITIONER

## 2023-02-20 PROCEDURE — 85027 COMPLETE CBC AUTOMATED: CPT | Performed by: NURSE PRACTITIONER

## 2023-02-20 PROCEDURE — 80048 BASIC METABOLIC PNL TOTAL CA: CPT | Performed by: NURSE PRACTITIONER

## 2023-02-20 PROCEDURE — 36415 COLL VENOUS BLD VENIPUNCTURE: CPT | Performed by: NURSE PRACTITIONER

## 2023-02-20 RX ORDER — POLYETHYLENE GLYCOL 3350 17 G/17G
1 POWDER, FOR SOLUTION ORAL DAILY
Qty: 289 G | Refills: 0 | Status: ON HOLD | OUTPATIENT
Start: 2023-02-20 | End: 2023-03-16

## 2023-02-20 ASSESSMENT — PAIN SCALES - GENERAL: PAINLEVEL: NO PAIN (0)

## 2023-02-20 NOTE — PATIENT INSTRUCTIONS
It was a pleasure taking care of you today.  I've included a brief summary of our discussion and care plan from today's visit below.  Please review this information with your primary care provider.  ______________________________________________________________________    My recommendations are summarized as follows:    As we discussed today, a follow up x-ray of abdomen was ordered. Please also complete your lab work before you leave the clinic.    2. I ordered abdominal ultrasound to look for any potential issues with your gallbladder and pancreas for evaluation of upper abdominal pain.    3. Start taking Miralax for constipation. You may choose to take 1/2 to one scoopful as needed or 1/3 to 1/2 of the scoopful every day (this would be a preferred option).    4. Keep your appointment for colonoscopy.    5. If you still have anemia on lab work, iron supplement could be ordered.    6. Eat small frequent meals. Stay on low fiber diet. Maintain proper hydration. Monitor your stool pattern.    Return to GI Clinic as needed   ______________________________________________________________________    Constipation refers to a change in bowel habits, but it has varied meanings. Stools may be too hard or too small, difficult to pass, or infrequent (less than three times per week). People with constipation may also notice a frequent need to strain and a sense that the bowels are not empty.  Constipation is a very common problem. Each year more than 2.5 million Americans visit their healthcare provider for relief from this problem. Many factors can contribute to or cause constipation, although in most people, no single cause can be found. In general, constipation occurs more frequently as you get older.     Treatment for constipation includes changing some behaviors, eating foods high in fiber, and using medications if needed.  Behavior changes -- The bowels are most active following meals, and this is often the time when  stools will pass most readily. If you ignore your body's signals to have a bowel movement, the signals become weaker and weaker over time.By paying close attention to these signals, you may have an easier time moving your bowels. Drinking a caffeine-containing beverage in the morning may also be helpful.    Medications:  Avoid bulk forming meds -- These include natural fiber and commercial fiber preparations such as Psyllium (Konsyl; Metamucil; Perdiem), Methylcellulose (Citrucel),  or Wheat dextrin (Benefiber);  You should increase the dose of fiber supplements slowly to prevent gas and cramping, and you should always take the supplement with plenty of fluid.  2. Hyperosmolar medications  -- such as   ?Polyethylene glycol (MiraLAX, GlycoLax)  ? Lactulose  ? Sorbitol  Polyethylene glycol is generally preferred since it does not cause gas or bloating and is available without a prescription. Lactulose and sorbitol can produce gas and bloating. Sorbitol works as well as lactulose and is much less expensive.  3.Saline laxatives -- such as Milk of Magnesia and magnesium citrate (Evac-Q-Mag) act similarly to the hyperosmolar drugs.  4.  Avoid stimulant drugs, which include Senna (eg, Black Draught, Ex-Lax, Senokot) and Bisacodil (eg, Correctol, Doxidan, Dulcolax).     Constipation treatments to avoid:  ? Emollients - Emollient laxatives, principally mineral oil, soften stools by moisturizing them. However, other treatments have fewer risks and equal benefit.  ? Natural products - A wide variety of natural products are advertised for constipation. Some of them contain the active ingredients found in commercially available laxatives. However, their dose and purity may not be carefully controlled. Thus, these products are not generally recommended.  ? A variety of home-made enema preparations have been used throughout the years, such as soapsuds, hydrogen peroxide, and household detergents. These can be extremely irritating  to the lining of the intestine and should be avoided.           ______________________________________________________________________    Who do I call with any questions after my visit?  Please be in touch if there are any further questions that arise following today's visit.  There are multiple ways to contact your gastroenterology care team.      During business hours, you may reach a Gastroenterology nurse at [phone], option 3.     To schedule or reschedule an appointment, please call [phone].   To schedule your imaging studies (CT, MRI, ultrasound)  call [phone] (or toll-free # [phone])  To schedule your lab work at [facility], please call [phone]    You can always send a secure message through Vertascale.  Vertascale messages are answered by your nurse or doctor typically within 24 hours.  Please allow extra time on weekends and holidays.      For urgent/emergent questions after business hours, you may reach the on-call GI Fellow by contacting the [facility]  at [phone].    In order for your refill to be processed in a timely fashion, it is your responsibility to ensure you follow the recommendations from your provider regarding your laboratory studies and follow up appointments.       How will I get the results of any tests ordered?    You will receive all of your results.  If you have signed up for Vertascale, any tests ordered at your visit will be available to you after your physician reviews them.  Typically this takes 1-2 weeks.  If there are urgent results that require a change in your care plan, your physician or nurse will call you to discuss the next steps.   What is Vertascale?  Vertascale is a secure way for you to access all of your healthcare records from the [facility].  It is a web based computer program, so you can sign on to it from any location.  It also allows you to send secure messages to your care team.  I  recommend signing up for Appsindep access if you have not already done so and are comfortable with using a computer.    How to I schedule a follow-up visit?  If you did not schedule a follow-up visit today, please call [phone] to schedule a follow-up office visit.      Sincerely,  CEZAR Jeffrey,  [facility],  Division of Gastroenterology   ([facility])

## 2023-02-20 NOTE — PROGRESS NOTES
Gastroenterology CLINIC VISIT, NEW PATIENT    CC/REFERRING PROVIDER: Tyshawn Mendoza  REASON FOR CONSULTATION:     HPI: 67 year old female with PMH of osteoporosis (on Boniva), non-Hodgkin lymphoma (post-pelvic radiation in 2002),and hypothyroidism, presenting to GI clinic for a consultation on small bowel obstruction. Patient underwent knee replacement on 5th of December and was on oxycodone. Developed SBO on 12/9/22 that prompted her to seek medical attention.CT scan abdomen pelvis without contrast demonstrates small bowel obstruction with proximally dilated proximal to mid small bowel loops.  No free air or abscess.  Trace pelvic free fluid.  Was treated with NPO, IVF, and NG for decompression.Discharged home in satisfactory condition.  Then, was admitted on 1/31/22 for recurrence of bowel obstruction. This time, no NG was placed. Patient's symptoms had improved by slow advancement of her diet and IVF. The patient denies any history of previous bowel obstructions.    Today, the patient denies any nausea and vomiting. Stated that her abdominal pain is seldom and mild, mainly in the isabela-umbilical area.  Complains of some issues with constipation (hard stools), but having bowel movements every day. Denies black stools or blood per rectum.   Her appetite is good and she is still on low fiber diet.  Patient also reported that for the past 10-15 years, she has been having intermittent epigastric pain that radiates to upper back, between shoulder blades. Not certain if there is a correlation between meal intake and pain. Said that sometimes, it wakes her up from sleep. Pain may last from minutes to hours and intensifies over time. Patient occasionally takes TUMS,which helps. She thinks that she could have gas pain. Said that laying on her side helps and passing gas helps this type of pain.    Patient denies smoking, alcohol or illicit drug use. Has been taking ibuprofen for a few week after knee surgery, alternating  it with tylenol.    ROS: 10pt ROS performed and otherwise negative.    PAST MEDICAL HISTORY:  Past Medical History:   Diagnosis Date     Arthritis 2014    in knee     BCC (basal cell carcinoma), face 2009    right lateral nose.  removed via Mohs     Hypothyroidism 10/11/2012     Osteoporosis      Other malignant lymphomas, unspecified site, extranodal and solid organ sites 2002    Non-Hodgkin's Lymphoma     Scoliosis (and kyphoscoliosis), idiopathic      Uncomplicated asthma childhood    Rarely... haven't noticed for a long time       PREVIOUS ABDOMINAL/GYNECOLOGIC SURGERIES:  Past Surgical History:   Procedure Laterality Date     ARTHROPLASTY KNEE Left 12/5/2022    Procedure: LEFT TOTAL KNEE ARTHROPLASTY;  Surgeon: Daren Coleman MD;  Location: [facility] Main OR     BIOPSY  2002, 2011?    leg, nose     COLONOSCOPY  11/09/2012    Procedure: COLONOSCOPY;  Colonoscopy;  Surgeon: Drew Perdue MD;  Location: [facility]     ORTHOPEDIC SURGERY Left 2012, 2014    broke radius by wrist... plate and 9 screws put in, arthrosc     SURGICAL HISTORY OF -   12/2001    Lymph Node Excision on left leg     SURGICAL HISTORY OF -   01/2009    BCC removal right nasal area     ZZC TOTAL KNEE ARTHROPLASTY Right 03/2015         PERTINENT MEDICATIONS:  Current Outpatient Medications   Medication Sig Dispense Refill     Calcium Citrate-Vitamin D (CALCIUM + D PO) Take 1 tablet by mouth daily       IBANdronate (BONIVA) 150 MG tablet Take 1 tablet (150 mg) by mouth every 30 days 3 tablet 3     ibuprofen (ADVIL/MOTRIN) 200 MG tablet Take 600 mg by mouth every 8 hours as needed for pain       levothyroxine (SYNTHROID/LEVOTHROID) 50 MCG tablet Take 1 tablet (50 mcg) by mouth daily 90 tablet 3     polyethylene glycol (MIRALAX) 17 GM/Dose powder Take 17 g (1 capful.) by mouth daily Take 1/3-1/2 of the scoopful every day or 1/2 to one capful as needed 289 g 0     UNABLE TO FIND MEDICATION NAME: Melaleuca PM - Longevity  2 CardiOmegaEPA  (Omega 3 Fishoils - 1000 mg EPG and 100 mg DHA)  2 Vitality Multivitamin and Mineral (Iron, Vitamin K, Folic acid, Biotin, Calcium)  2 CellWise Antioxidant (Olive extract, grape seed extract, Vitamin C, carotenoids)  2 ProveXCV (Blood pressure support)  2 RecoverAl (Inflammation)  1 Florify (acid resistance, probiotic)  -1 Vitality [place] (omega 3, Brain eye and heart)  -2 Acuity [place] (Brain health)  -1 K2-03 (Redirects calcium bone health)  -1Replenex Extra strength   -1 Nutraview (Vision- macular/lens health)  1 CoQ10 +Cellular Energy Support (antioxidants)       No other OTC/herbal/supplements reported by patient.    SOCIAL HISTORY:  Social History     Socioeconomic History     Marital status:      Spouse name: Not on file     Number of children: Not on file     Years of education: Not on file     Highest education level: Not on file   Occupational History     Not on file   Tobacco Use     Smoking status: Never     Smokeless tobacco: Never   Vaping Use     Vaping Use: Never used   Substance and Sexual Activity     Alcohol use: No     Drug use: No     Sexual activity: Not Currently     Partners: Male     Birth control/protection: None   Other Topics Concern     Parent/sibling w/ CABG, MI or angioplasty before 65F 55M? Yes     Comment: Dad, 43 years ago but doing fine since   Social History Narrative     Not on file     Social Determinants of Health     Financial Resource Strain: Not on file   Food Insecurity: Not on file   Transportation Needs: Not on file   Physical Activity: Not on file   Stress: Not on file   Social Connections: Not on file   Intimate Partner Violence: Not on file   Housing Stability: Not on file       FAMILY HISTORY:  Denies colon/panc/esophageal/other GI CA, no other Lora or other HPS-related Kitty. No IBD/celiac, no other AI/liver/thyroid disease.    Family History   Problem Relation Age of Onset     C.A.D. Father         heart problems at age 40     Arthritis Father         bilat  "knee replacements \"bowlegged\"     Coronary Artery Disease Father         heart attack 43 years ago     Asthma Father      Lipids Mother      Arthritis Mother         knees/fingers     Hypertension Mother      Other Cancer Mother         uterine cancer, 2016     Cardiovascular Paternal Grandfather      Coronary Artery Disease Paternal Grandfather          of heart attack     Neurologic Disorder Maternal Grandfather         park     Diabetes Maternal Aunt      Osteoporosis Paternal Grandmother      Arthritis Son         spondylolysis     Coronary Artery Disease Other         Congestive heart failure     Asthma Other         paternal aunt, adult onset     Asthma Other         paternal aunt       PHYSICAL EXAMINATION:  Vitals reviewed  /68 (BP Location: Right arm, Patient Position: Sitting, Cuff Size: Adult Regular)   Ht 1.651 m (5' 5\")   Wt 61.2 kg (134 lb 14.4 oz)   BMI 22.45 kg/m      General: Patient appears well in no acute distress.   Skin: No visualized rash or lesions on visualized skin  Eyes: EOMI, no erythema, sclera icterus or discharge noted  Resp: breathing comfortably without accessory muscle usage, speaking in full sentences, no cough  Lung sounds clear  Card: Regular and rhythmic S1 and S2. No murmur,gallop, or rub  Abdomen: Active bowel sounds on the right; hypoactive in the LLQ and left periumbilical area. Soft to palpation,some tenderness in suprapubic area. No guarding or rebound tenderness   MSK: Appears to have normal range of motion based on visualized movements  Neurologic: No apparent tremors, facial movements symmetric  Psych: affect normal, alert and oriented      PERTINENT STUDIES Reviewed in EMR    ASSESSMENT/PLAN:  67 year old female  presented  to GI clinic for a follow up for 2 episodes of small bowel obstruction in 2022, after her knee replacement surgery. CT scan abdomen/pelvis without contrast demonstrates small bowel obstruction with proximally dilated proximal " to mid small bowel loops.  No free air or abscess. SBO was resolved with conservative interventions.   History of pelvic radiation in 2002 for non-Hodgkin lymphoma. No previous bowel obstruction history.   Today, the patient complains of mild isabela-umbilical discomfort once in a while. No N/V, blck stools, bloating, or belching. Having formed bowel movements every day, but complains of hard stools. She is on low fiber diet. No taking any laxatives.  Patient also complains of intermittent epigastric pain that radiates to upper back, which has been going on for the past 10-15 years.Not associated with food intake or bowel elimination. Occurs seldom, maybe once every few months. Lasts minutes to hours. Admits to nocturnal symptoms.  We discussed possible etiology of the patient's bowel obstruction with post-surgical opiate therapy in combination with high fiber diet as a most likely etiology. Cannot exclude post-irradiation adhesions. Patient has no history of abdominal surgery.  Recommended the following:  - Proceed with colonoscopy as it scheduled on 3/16/23.  - Repeat abdominal x-ray today to confirm resolution of SBO. Patient had hypoactive bowel sounds and some tenderness on palpation in the left lower abdomen.  -Non-urgent abdominal ultrasound for evaluation of possible hepatobiliary or pancreatic etiology of her epigastric pain.  - Lab work today (ordered by PCP). Pt had mild anemia with Hgb of 10.5 during hospitalization.  -Miralax 1/3- 1/2 of capful daily or 1/2-one capful as needed. Suggested to take daily.  - Continue low fiber diet. Maintain proper hydration.  - Reminded on symptoms of bowel obstruction and when to seek medical attention.      ICD-10-CM    1. Constipation, unspecified constipation type  K59.00 XR Abdomen 2 Views     polyethylene glycol (MIRALAX) 17 GM/Dose powder     US Abdomen Complete      2. SBO (small bowel obstruction) (H)  K56.609 Adult GI  Referral - Consult Only     XR  Abdomen 2 Views     US Abdomen Complete          Patient verbalized understanding and appreciation of care provided. Stated that all of the questions were answered to her/his satisfaction.    Additional 13 minutes on the date of service was spent performing the following:   -Preparing to see the patient (eg, review of tests,providers progress notes, medical/surgical history,etc)   -Ordering medications, tests, or procedures   -Documenting clinical information in the electronic or other health record     RTC as needed    Thank you for this consultation. It was a pleasure to participate in the care of this patient; please contact us with any further questions.    SINGH Jeffrey, FNP-C  [facility]  Gastroenterology Department  [place], [place]    This note was created with Dragon voice recognition software, and while reviewed for accuracy, inadvertent minor typographic errors may occur. Please contact the provider if you have any questions.

## 2023-02-20 NOTE — LETTER
2/20/2023         RE: Eleazar Cortez  [address] [facility] [address]        Dear Colleague,    Thank you for referring your patient, Eleazar Cortez, to the [facility] [place]. Please see a copy of my visit note below.    Gastroenterology CLINIC VISIT, NEW PATIENT    CC/REFERRING PROVIDER: Tyshawn Mendoza  REASON FOR CONSULTATION:     HPI: 67 year old female with PMH of osteoporosis (on Boniva), non-Hodgkin lymphoma (post-pelvic radiation in 2002),and hypothyroidism, presenting to GI clinic for a consultation on small bowel obstruction. Patient underwent knee replacement on 5th of December and was on oxycodone. Developed SBO on 12/9/22 that prompted her to seek medical attention.CT scan abdomen pelvis without contrast demonstrates small bowel obstruction with proximally dilated proximal to mid small bowel loops.  No free air or abscess.  Trace pelvic free fluid.  Was treated with NPO, IVF, and NG for decompression.Discharged home in satisfactory condition.  Then, was admitted on 1/31/22 for recurrence of bowel obstruction. This time, no NG was placed. Patient's symptoms had improved by slow advancement of her diet and IVF. The patient denies any history of previous bowel obstructions.    Today, the patient denies any nausea and vomiting. Stated that her abdominal pain is seldom and mild, mainly in the isabela-umbilical area.  Complains of some issues with constipation (hard stools), but having bowel movements every day. Denies black stools or blood per rectum.   Her appetite is good and she is still on low fiber diet.  Patient also reported that for the past 10-15 years, she has been having intermittent epigastric pain that radiates to upper back, between shoulder blades. Not certain if there is a correlation between meal intake and pain. Said that sometimes, it wakes her up from sleep. Pain may last from minutes to hours and intensifies over time. Patient occasionally takes  TUMS,which helps. She thinks that she could have gas pain. Said that laying on her side helps and passing gas helps this type of pain.    Patient denies smoking, alcohol or illicit drug use. Has been taking ibuprofen for a few week after knee surgery, alternating it with tylenol.    ROS: 10pt ROS performed and otherwise negative.    PAST MEDICAL HISTORY:  Past Medical History:   Diagnosis Date     Arthritis 2014    in knee     BCC (basal cell carcinoma), face 2009    right lateral nose.  removed via Mohs     Hypothyroidism 10/11/2012     Osteoporosis      Other malignant lymphomas, unspecified site, extranodal and solid organ sites 2002    Non-Hodgkin's Lymphoma     Scoliosis (and kyphoscoliosis), idiopathic      Uncomplicated asthma childhood    Rarely... haven't noticed for a long time       PREVIOUS ABDOMINAL/GYNECOLOGIC SURGERIES:  Past Surgical History:   Procedure Laterality Date     ARTHROPLASTY KNEE Left 12/5/2022    Procedure: LEFT TOTAL KNEE ARTHROPLASTY;  Surgeon: Daren Coleman MD;  Location: [facility] Main OR     BIOPSY  2002, 2011?    leg, nose     COLONOSCOPY  11/09/2012    Procedure: COLONOSCOPY;  Colonoscopy;  Surgeon: Drew Perdue MD;  Location: [facility]     ORTHOPEDIC SURGERY Left 2012, 2014    broke radius by wrist... plate and 9 screws put in, arthrosc     SURGICAL HISTORY OF -   12/2001    Lymph Node Excision on left leg     SURGICAL HISTORY OF -   01/2009    BCC removal right nasal area     ZZC TOTAL KNEE ARTHROPLASTY Right 03/2015         PERTINENT MEDICATIONS:  Current Outpatient Medications   Medication Sig Dispense Refill     Calcium Citrate-Vitamin D (CALCIUM + D PO) Take 1 tablet by mouth daily       IBANdronate (BONIVA) 150 MG tablet Take 1 tablet (150 mg) by mouth every 30 days 3 tablet 3     ibuprofen (ADVIL/MOTRIN) 200 MG tablet Take 600 mg by mouth every 8 hours as needed for pain       levothyroxine (SYNTHROID/LEVOTHROID) 50 MCG tablet Take 1 tablet (50 mcg) by mouth  daily 90 tablet 3     polyethylene glycol (MIRALAX) 17 GM/Dose powder Take 17 g (1 capful.) by mouth daily Take 1/3-1/2 of the scoopful every day or 1/2 to one capful as needed 289 g 0     UNABLE TO FIND MEDICATION NAME: Melaleuca PM - Longevity  2 CardiOmegaEPA (Omega 3 Fishoils - 1000 mg EPG and 100 mg DHA)  2 Vitality Multivitamin and Mineral (Iron, Vitamin K, Folic acid, Biotin, Calcium)  2 CellWise Antioxidant (Olive extract, grape seed extract, Vitamin C, carotenoids)  2 ProveXCV (Blood pressure support)  2 RecoverAl (Inflammation)  1 Florify (acid resistance, probiotic)  -1 Vitality Plummer (omega 3, Brain eye and heart)  -2 Acuity [place] (Brain health)  -1 K2-03 (Redirects calcium bone health)  -1Replenex Extra strength   -1 Nutraview (Vision- macular/lens health)  1 CoQ10 +Cellular Energy Support (antioxidants)       No other OTC/herbal/supplements reported by patient.    SOCIAL HISTORY:  Social History     Socioeconomic History     Marital status:      Spouse name: Not on file     Number of children: Not on file     Years of education: Not on file     Highest education level: Not on file   Occupational History     Not on file   Tobacco Use     Smoking status: Never     Smokeless tobacco: Never   Vaping Use     Vaping Use: Never used   Substance and Sexual Activity     Alcohol use: No     Drug use: No     Sexual activity: Not Currently     Partners: Male     Birth control/protection: None   Other Topics Concern     Parent/sibling w/ CABG, MI or angioplasty before 65F 55M? Yes     Comment: Dad, 43 years ago but doing fine since   Social History Narrative     Not on file     Social Determinants of Health     Financial Resource Strain: Not on file   Food Insecurity: Not on file   Transportation Needs: Not on file   Physical Activity: Not on file   Stress: Not on file   Social Connections: Not on file   Intimate Partner Violence: Not on file   Housing Stability: Not on file       FAMILY HISTORY:  Denies  "colon/panc/esophageal/other GI CA, no other Lora or other HPS-related Kitty. No IBD/celiac, no other AI/liver/thyroid disease.    Family History   Problem Relation Age of Onset     C.A.D. Father         heart problems at age 40     Arthritis Father         bilat knee replacements \"bowlegged\"     Coronary Artery Disease Father         heart attack 43 years ago     Asthma Father      Lipids Mother      Arthritis Mother         knees/fingers     Hypertension Mother      Other Cancer Mother         uterine cancer, 2016     Cardiovascular Paternal Grandfather      Coronary Artery Disease Paternal Grandfather          of heart attack     Neurologic Disorder Maternal Grandfather         park     Diabetes Maternal Aunt      Osteoporosis Paternal Grandmother      Arthritis Son         spondylolysis     Coronary Artery Disease Other         Congestive heart failure     Asthma Other         paternal aunt, adult onset     Asthma Other         paternal aunt       PHYSICAL EXAMINATION:  Vitals reviewed  /68 (BP Location: Right arm, Patient Position: Sitting, Cuff Size: Adult Regular)   Ht 1.651 m (5' 5\")   Wt 61.2 kg (134 lb 14.4 oz)   BMI 22.45 kg/m      General: Patient appears well in no acute distress.   Skin: No visualized rash or lesions on visualized skin  Eyes: EOMI, no erythema, sclera icterus or discharge noted  Resp: breathing comfortably without accessory muscle usage, speaking in full sentences, no cough  Lung sounds clear  Card: Regular and rhythmic S1 and S2. No murmur,gallop, or rub  Abdomen: Active bowel sounds on the right; hypoactive in the LLQ and left periumbilical area. Soft to palpation,some tenderness in suprapubic area. No guarding or rebound tenderness   MSK: Appears to have normal range of motion based on visualized movements  Neurologic: No apparent tremors, facial movements symmetric  Psych: affect normal, alert and oriented      PERTINENT STUDIES Reviewed in EMR    ASSESSMENT/PLAN:  67 " year old female  presented  to GI clinic for a follow up for 2 episodes of small bowel obstruction in December 2022, after her knee replacement surgery. CT scan abdomen/pelvis without contrast demonstrates small bowel obstruction with proximally dilated proximal to mid small bowel loops.  No free air or abscess. SBO was resolved with conservative interventions.   History of pelvic radiation in 2002 for non-Hodgkin lymphoma. No previous bowel obstruction history.   Today, the patient complains of mild isabela-umbilical discomfort once in a while. No N/V, blck stools, bloating, or belching. Having formed bowel movements every day, but complains of hard stools. She is on low fiber diet. No taking any laxatives.  Patient also complains of intermittent epigastric pain that radiates to upper back, which has been going on for the past 10-15 years.Not associated with food intake or bowel elimination. Occurs seldom, maybe once every few months. Lasts minutes to hours. Admits to nocturnal symptoms.  We discussed possible etiology of the patient's bowel obstruction with post-surgical opiate therapy in combination with high fiber diet as a most likely etiology. Cannot exclude post-irradiation adhesions. Patient has no history of abdominal surgery.  Recommended the following:  - Proceed with colonoscopy as it scheduled on 3/16/23.  - Repeat abdominal x-ray today to confirm resolution of SBO. Patient had hypoactive bowel sounds and some tenderness on palpation in the left lower abdomen.  -Non-urgent abdominal ultrasound for evaluation of possible hepatobiliary or pancreatic etiology of her epigastric pain.  - Lab work today (ordered by PCP). Pt had mild anemia with Hgb of 10.5 during hospitalization.  -Miralax 1/3- 1/2 of capful daily or 1/2-one capful as needed. Suggested to take daily.  - Continue low fiber diet. Maintain proper hydration.  - Reminded on symptoms of bowel obstruction and when to seek medical attention.       ICD-10-CM    1. Constipation, unspecified constipation type  K59.00 XR Abdomen 2 Views     polyethylene glycol (MIRALAX) 17 GM/Dose powder     US Abdomen Complete      2. SBO (small bowel obstruction) (H)  K56.609 Adult GI  Referral - Consult Only     XR Abdomen 2 Views     US Abdomen Complete          Patient verbalized understanding and appreciation of care provided. Stated that all of the questions were answered to her/his satisfaction.    Additional 13 minutes on the date of service was spent performing the following:   -Preparing to see the patient (eg, review of tests,providers progress notes, medical/surgical history,etc)   -Ordering medications, tests, or procedures   -Documenting clinical information in the electronic or other health record     RTC as needed    Thank you for this consultation. It was a pleasure to participate in the care of this patient; please contact us with any further questions.    SINGH Jeffrey, FNP-C  [facility]  Gastroenterology Department  [place], [place]    This note was created with Dragon voice recognition software, and while reviewed for accuracy, inadvertent minor typographic errors may occur. Please contact the provider if you have any questions.      Again, thank you for allowing me to participate in the care of your patient.        Sincerely,        SINGH JEFFREY CNP

## 2023-02-21 ENCOUNTER — HOSPITAL ENCOUNTER (OUTPATIENT)
Dept: ULTRASOUND IMAGING | Facility: HOSPITAL | Age: 68
Discharge: HOME | End: 2023-02-21
Attending: NURSE PRACTITIONER | Admitting: NURSE PRACTITIONER
Payer: COMMERCIAL

## 2023-02-21 DIAGNOSIS — K56.609 SBO (SMALL BOWEL OBSTRUCTION) (H): ICD-10-CM

## 2023-02-21 DIAGNOSIS — K59.00 CONSTIPATION, UNSPECIFIED CONSTIPATION TYPE: ICD-10-CM

## 2023-02-21 PROCEDURE — 76705 ECHO EXAM OF ABDOMEN: CPT

## 2023-03-08 RX ORDER — BISACODYL 5 MG/1
TABLET, DELAYED RELEASE ORAL
Qty: 4 TABLET | Refills: 0 | Status: SHIPPED | OUTPATIENT
Start: 2023-03-08 | End: 2023-05-01

## 2023-03-16 ENCOUNTER — HOSPITAL ENCOUNTER (OUTPATIENT)
Facility: CLINIC | Age: 68
Discharge: HOME | End: 2023-03-16
Attending: COLON & RECTAL SURGERY | Admitting: COLON & RECTAL SURGERY
Payer: COMMERCIAL

## 2023-03-16 VITALS
DIASTOLIC BLOOD PRESSURE: 74 MMHG | OXYGEN SATURATION: 100 % | HEART RATE: 101 BPM | HEIGHT: 65 IN | RESPIRATION RATE: 19 BRPM | SYSTOLIC BLOOD PRESSURE: 110 MMHG | BODY MASS INDEX: 22.33 KG/M2 | WEIGHT: 134 LBS

## 2023-03-16 DIAGNOSIS — Z12.11 ENCOUNTER FOR SCREENING COLONOSCOPY: Primary | ICD-10-CM

## 2023-03-16 LAB — COLONOSCOPY: NORMAL

## 2023-03-16 PROCEDURE — 45378 DIAGNOSTIC COLONOSCOPY: CPT | Performed by: COLON & RECTAL SURGERY

## 2023-03-16 PROCEDURE — 250N000011 HC RX IP 250 OP 636: Performed by: COLON & RECTAL SURGERY

## 2023-03-16 PROCEDURE — G0500 MOD SEDAT ENDO SERVICE >5YRS: HCPCS | Performed by: COLON & RECTAL SURGERY

## 2023-03-16 PROCEDURE — G0121 COLON CA SCRN NOT HI RSK IND: HCPCS | Performed by: COLON & RECTAL SURGERY

## 2023-03-16 RX ORDER — ONDANSETRON 2 MG/ML
4 INJECTION INTRAMUSCULAR; INTRAVENOUS
Status: DISCONTINUED | OUTPATIENT
Start: 2023-03-16 | End: 2023-03-16 | Stop reason: HOSPADM

## 2023-03-16 RX ORDER — FENTANYL CITRATE 50 UG/ML
INJECTION, SOLUTION INTRAMUSCULAR; INTRAVENOUS PRN
Status: DISCONTINUED | OUTPATIENT
Start: 2023-03-16 | End: 2023-03-16 | Stop reason: HOSPADM

## 2023-03-16 RX ORDER — LIDOCAINE 40 MG/G
CREAM TOPICAL
Status: DISCONTINUED | OUTPATIENT
Start: 2023-03-16 | End: 2023-03-16 | Stop reason: HOSPADM

## 2023-03-16 ASSESSMENT — ACTIVITIES OF DAILY LIVING (ADL): ADLS_ACUITY_SCORE: 35

## 2023-03-16 NOTE — H&P
Colon & Rectal Surgery History and Physical  Pre-Endoscopy Procedure Note    History of Present Illness   I have been asked by Cheryl Serrano to evaluate this 67 year old female for colorectal cancer screening. She currently denies any abdominal pain, weight loss, bleeding per rectum, or recent change in bowel habits.    Past Medical History  Diagnosis Date     Arthritis 2014    in knee     BCC (basal cell carcinoma), face 2009    right lateral nose; removed via MOHS     Hypothyroidism 10/11/2012     Osteoporosis      Other malignant lymphomas, unspecified site, extranodal and solid organ sites 2002    Non-Hodgkin's Lymphoma     Scoliosis (and kyphoscoliosis), idiopathic      Uncomplicated asthma childhood       Past Surgical History  Past Surgical History:   Procedure Laterality Date     ARTHROPLASTY KNEE Left 12/05/2022    Procedure: LEFT TOTAL KNEE ARTHROPLASTY;  Surgeon: Daren Coleman MD;  Location: [facility] Main OR     ARTHROPLASTY KNEE Right      BIOPSY  2002, 2011?    leg, nose     ORTHOPEDIC SURGERY Left 2012, 2014    broke radius by wrist... plate and 9 screws put in, arthrosc     SURGICAL HISTORY OF -   12/2001    Lymph Node Excision on left leg     SURGICAL HISTORY OF -   01/2009    BCC removal right nasal area        Medications  Medication Sig     Calcium Citrate-Vitamin D (CALCIUM + D PO) Take 1 tablet by mouth daily     levothyroxine (SYNTHROID/LEVOTHROID) 50 MCG tablet Take 1 tablet (50 mcg) by mouth daily     IBANdronate (BONIVA) 150 MG tablet Take 1 tablet (150 mg) by mouth every 30 days       Allergies  Allergen Reactions     Codeine Difficulty breathing     Dye [Contrast Dye]      congestion        Family History   Family history includes Arthritis in her father, mother, and son; Asthma in her father and other family members; C.A.D. in her father; Cardiovascular Disorder in her paternal grandfather; Coronary Artery Disease in her father, paternal grandfather, and another family member;  "Diabetes in her maternal aunt; Hypertension in her mother; Lipid Disorder in her mother; Neurologic Disorder in her maternal grandfather; Osteoporosis in her paternal grandmother; Other Cancer in her mother.     Social History    She reports that she has never smoked. She has never used smokeless tobacco. She reports that she does not drink alcohol and does not use drugs.    Review of Systems   Constitutional:  No fever, weight change or fatigue.    Eyes:     No dry eyes or vision changes.   Ears/Nose/Throat/Neck:  No oral ulcers, sore throat or voice change.    Cardiovascular:   No palpitations, syncope, angina or edema.   Respiratory:    No chest pain, excessive sleepiness, shortness of breath or hemoptysis.    Gastrointestinal:   No abdominal pain, nausea, vomiting, diarrhea or heartburn.    Genitourinary:   No dysuria, hematuria, urinary retention or urinary frequency.   Musculoskeletal:  No joint swelling or arthralgias.    Dermatologic:  No skin rash or other skin changes.   Neurologic:    No focal weakness or numbness. No neuropathy.   Psychiatric:    No depression, anxiety, suicidal ideation, or paranoid ideation.   Endocrine:   No cold or heat intolerance, polydipsia, hirsutism, change in libido, or flushing.   Hematology/Lymphatic:  No bleeding or lymphadenopathy.    Allergy/Immunology:  No rhinitis or hives.     Physical Exam   Vitals:  /73, HR 97, RR 16, height 1.651 m (5' 5\"), weight 60.8 kg (134 lb), SpO2 96 %, not currently breastfeeding.    General:  Alert and oriented to person, place and time   Airway: Normal oropharyngeal airway and neck mobility   Lungs:  Clear bilaterally   Heart:  Regular rate and rhythm   Abdomen: Soft, NT, ND, no masses   Extremities: Warm, good capillary refill    ASA Grade: II (mild systemic disease)    Impression: Cleared for use of conscious sedation for colorectal cancer screening    Plan: Proceed with colonoscopy     Courtney Kelly MD  [facility]  [phone]

## 2023-04-03 ENCOUNTER — MYC MEDICAL ADVICE (OUTPATIENT)
Dept: GASTROENTEROLOGY | Facility: CLINIC | Age: 68
End: 2023-04-03
Payer: COMMERCIAL

## 2023-04-03 ENCOUNTER — TELEPHONE (OUTPATIENT)
Dept: GASTROENTEROLOGY | Facility: CLINIC | Age: 68
End: 2023-04-03
Payer: COMMERCIAL

## 2023-04-03 DIAGNOSIS — K56.609 SBO (SMALL BOWEL OBSTRUCTION) (H): ICD-10-CM

## 2023-04-03 DIAGNOSIS — D50.8 OTHER IRON DEFICIENCY ANEMIA: ICD-10-CM

## 2023-04-03 DIAGNOSIS — R19.5 LOOSE STOOLS: Primary | ICD-10-CM

## 2023-04-03 DIAGNOSIS — R10.817 GENERALIZED ABDOMINAL TENDERNESS WITHOUT REBOUND TENDERNESS: ICD-10-CM

## 2023-04-04 NOTE — TELEPHONE ENCOUNTER
Tyra,   I'm going to order stool studies and blood test to complete asap. If no C.diff, can start imodium 2 mg in the morning, followed by 1 mg as needed after each loose stool. Not to exceed 8 tablets a day. May also try PeptoBismol after stool collection. BRAT diet with lots of fluids (Gatorade, sport drinks without caffeine) X 2-3 days. No fresh veggies/fruits, caffeine, alcohol, spicy foods for a few days.   If no improvement, I would definitely see the patient.   Thanks,   Saida Castro

## 2023-04-05 ENCOUNTER — LAB (OUTPATIENT)
Dept: LAB | Facility: CLINIC | Age: 68
End: 2023-04-05
Payer: COMMERCIAL

## 2023-04-05 DIAGNOSIS — D50.8 OTHER IRON DEFICIENCY ANEMIA: ICD-10-CM

## 2023-04-05 DIAGNOSIS — R19.5 LOOSE STOOLS: ICD-10-CM

## 2023-04-05 DIAGNOSIS — K56.609 SBO (SMALL BOWEL OBSTRUCTION) (H): ICD-10-CM

## 2023-04-05 DIAGNOSIS — R10.817 GENERALIZED ABDOMINAL TENDERNESS WITHOUT REBOUND TENDERNESS: ICD-10-CM

## 2023-04-05 LAB
C DIFF TOX B STL QL: NEGATIVE
ERYTHROCYTE [DISTWIDTH] IN BLOOD BY AUTOMATED COUNT: 13.2 % (ref 10–15)
HCT VFR BLD AUTO: 38.1 % (ref 35–47)
HGB BLD-MCNC: 12.3 G/DL (ref 11.7–15.7)
MCH RBC QN AUTO: 30.6 PG (ref 26.5–33)
MCHC RBC AUTO-ENTMCNC: 32.3 G/DL (ref 31.5–36.5)
MCV RBC AUTO: 95 FL (ref 78–100)
PLATELET # BLD AUTO: 284 10E3/UL (ref 150–450)
RBC # BLD AUTO: 4.02 10E6/UL (ref 3.8–5.2)
WBC # BLD AUTO: 5.7 10E3/UL (ref 4–11)

## 2023-04-05 PROCEDURE — 80053 COMPREHEN METABOLIC PANEL: CPT

## 2023-04-05 PROCEDURE — 87493 C DIFF AMPLIFIED PROBE: CPT

## 2023-04-05 PROCEDURE — 85027 COMPLETE CBC AUTOMATED: CPT

## 2023-04-05 PROCEDURE — 86140 C-REACTIVE PROTEIN: CPT

## 2023-04-05 PROCEDURE — 36415 COLL VENOUS BLD VENIPUNCTURE: CPT

## 2023-04-06 LAB
ALBUMIN SERPL-MCNC: 3.2 G/DL (ref 3.4–5)
ALP SERPL-CCNC: 41 U/L (ref 40–150)
ALT SERPL W P-5'-P-CCNC: 17 U/L (ref 0–50)
ANION GAP SERPL CALCULATED.3IONS-SCNC: 5 MMOL/L (ref 3–14)
AST SERPL W P-5'-P-CCNC: 21 U/L (ref 0–45)
BILIRUB SERPL-MCNC: 0.4 MG/DL (ref 0.2–1.3)
BUN SERPL-MCNC: 13 MG/DL (ref 7–30)
C COLI+JEJUNI+LARI FUSA STL QL NAA+PROBE: NOT DETECTED
CALCIUM SERPL-MCNC: 8.5 MG/DL (ref 8.5–10.1)
CHLORIDE BLD-SCNC: 108 MMOL/L (ref 94–109)
CO2 SERPL-SCNC: 27 MMOL/L (ref 20–32)
CREAT SERPL-MCNC: 0.9 MG/DL (ref 0.52–1.04)
CRP SERPL-MCNC: <2.9 MG/L (ref 0–8)
EC STX1 GENE STL QL NAA+PROBE: NOT DETECTED
EC STX2 GENE STL QL NAA+PROBE: NOT DETECTED
GFR SERPL CREATININE-BSD FRML MDRD: 70 ML/MIN/1.73M2
GLUCOSE BLD-MCNC: 91 MG/DL (ref 70–99)
NOROV GI+II ORF1-ORF2 JNC STL QL NAA+PR: NOT DETECTED
POTASSIUM BLD-SCNC: 4 MMOL/L (ref 3.4–5.3)
PROT SERPL-MCNC: 6.9 G/DL (ref 6.8–8.8)
RVA NSP5 STL QL NAA+PROBE: NOT DETECTED
SALMONELLA SP RPOD STL QL NAA+PROBE: NOT DETECTED
SHIGELLA SP+EIEC IPAH STL QL NAA+PROBE: NOT DETECTED
SODIUM SERPL-SCNC: 140 MMOL/L (ref 133–144)
V CHOL+PARA RFBL+TRKH+TNAA STL QL NAA+PR: NOT DETECTED
Y ENTERO RECN STL QL NAA+PROBE: NOT DETECTED

## 2023-04-06 PROCEDURE — 83993 ASSAY FOR CALPROTECTIN FECAL: CPT

## 2023-04-06 PROCEDURE — 87506 IADNA-DNA/RNA PROBE TQ 6-11: CPT

## 2023-04-07 LAB — CALPROTECTIN STL-MCNT: 53.5 MG/KG (ref 0–49.9)

## 2023-05-01 ENCOUNTER — MYC MEDICAL ADVICE (OUTPATIENT)
Dept: GASTROENTEROLOGY | Facility: CLINIC | Age: 68
End: 2023-05-01
Payer: COMMERCIAL

## 2023-05-01 ENCOUNTER — LAB (OUTPATIENT)
Dept: LAB | Facility: CLINIC | Age: 68
End: 2023-05-01
Payer: COMMERCIAL

## 2023-05-01 DIAGNOSIS — R19.5 LOOSE STOOLS: ICD-10-CM

## 2023-05-01 DIAGNOSIS — R19.5 LOOSE STOOLS: Primary | ICD-10-CM

## 2023-05-01 PROCEDURE — 87329 GIARDIA AG IA: CPT

## 2023-05-01 PROCEDURE — 87328 CRYPTOSPORIDIUM AG IA: CPT

## 2023-05-01 PROCEDURE — 87209 SMEAR COMPLEX STAIN: CPT

## 2023-05-01 PROCEDURE — 87177 OVA AND PARASITES SMEARS: CPT

## 2023-05-01 NOTE — TELEPHONE ENCOUNTER
Saida Castro APRN CNP  You 6 minutes ago (10:16 AM)     TS  Tyra,   Likely, the patient is experiencing overflow diarrhea but I will check her for infection.  Please let the patient know that I ordered a stool test for parasites and Giardia.  She had enteric panel and C. Diff screening before and both tests were negative.   Is she taking Miralax or any laxatives (on her last visit, constipation was an issue).   OK to take 1/2 tablet of imodium 2-3 times a day if she is having more than 2 loose stools a day.   Thank you,   CEZAR Jeffrey

## 2023-05-02 LAB
C PARVUM AG STL QL IA: NEGATIVE
G LAMBLIA AG STL QL IA: NEGATIVE
O+P STL MICRO: NEGATIVE
TRI STN SPEC: NORMAL

## 2023-06-23 DIAGNOSIS — M81.0 OSTEOPOROSIS WITHOUT CURRENT PATHOLOGICAL FRACTURE, UNSPECIFIED OSTEOPOROSIS TYPE: ICD-10-CM

## 2023-06-27 RX ORDER — IBANDRONATE SODIUM 150 MG/1
1 TABLET, FILM COATED ORAL
Qty: 3 TABLET | Refills: 0 | Status: SHIPPED | OUTPATIENT
Start: 2023-06-27 | End: 2023-08-21

## 2023-07-11 ENCOUNTER — PATIENT OUTREACH (OUTPATIENT)
Dept: CARE COORDINATION | Facility: CLINIC | Age: 68
End: 2023-07-11
Payer: COMMERCIAL

## 2023-07-18 ENCOUNTER — PATIENT OUTREACH (OUTPATIENT)
Dept: CARE COORDINATION | Facility: CLINIC | Age: 68
End: 2023-07-18
Payer: COMMERCIAL

## 2023-08-15 ENCOUNTER — PATIENT OUTREACH (OUTPATIENT)
Dept: CARE COORDINATION | Facility: CLINIC | Age: 68
End: 2023-08-15
Payer: COMMERCIAL

## 2023-08-17 DIAGNOSIS — M81.0 OSTEOPOROSIS WITHOUT CURRENT PATHOLOGICAL FRACTURE, UNSPECIFIED OSTEOPOROSIS TYPE: ICD-10-CM

## 2023-08-21 RX ORDER — IBANDRONATE SODIUM 150 MG/1
1 TABLET, FILM COATED ORAL
Qty: 3 TABLET | Refills: 0 | Status: SHIPPED | OUTPATIENT
Start: 2023-08-21 | End: 2023-11-22

## 2023-09-10 ENCOUNTER — HEALTH MAINTENANCE LETTER (OUTPATIENT)
Age: 68
End: 2023-09-10

## 2023-09-12 ASSESSMENT — ENCOUNTER SYMPTOMS
ABDOMINAL PAIN: 0
WEAKNESS: 0
BREAST MASS: 0
CONSTIPATION: 0
JOINT SWELLING: 0
HEMATURIA: 0
FEVER: 0
MYALGIAS: 0
DYSURIA: 0
DIZZINESS: 0
CHILLS: 0
FREQUENCY: 0
NAUSEA: 0
PARESTHESIAS: 0
HEADACHES: 0
HEMATOCHEZIA: 0
HEARTBURN: 0
ARTHRALGIAS: 0
COUGH: 0
SORE THROAT: 0
NERVOUS/ANXIOUS: 0
EYE PAIN: 0
PALPITATIONS: 0
SHORTNESS OF BREATH: 0
DIARRHEA: 0

## 2023-09-12 ASSESSMENT — ACTIVITIES OF DAILY LIVING (ADL): CURRENT_FUNCTION: NO ASSISTANCE NEEDED

## 2023-09-13 ENCOUNTER — OFFICE VISIT (OUTPATIENT)
Dept: FAMILY MEDICINE | Facility: CLINIC | Age: 68
End: 2023-09-13
Payer: COMMERCIAL

## 2023-09-13 VITALS
DIASTOLIC BLOOD PRESSURE: 60 MMHG | OXYGEN SATURATION: 96 % | TEMPERATURE: 97.6 F | RESPIRATION RATE: 16 BRPM | HEIGHT: 65 IN | HEART RATE: 76 BPM | BODY MASS INDEX: 22.66 KG/M2 | SYSTOLIC BLOOD PRESSURE: 114 MMHG | WEIGHT: 136 LBS

## 2023-09-13 DIAGNOSIS — Z00.00 ENCOUNTER FOR MEDICARE ANNUAL WELLNESS EXAM: Primary | ICD-10-CM

## 2023-09-13 DIAGNOSIS — Z13.6 CARDIOVASCULAR SCREENING; LDL GOAL LESS THAN 160: ICD-10-CM

## 2023-09-13 DIAGNOSIS — Z12.31 VISIT FOR SCREENING MAMMOGRAM: ICD-10-CM

## 2023-09-13 DIAGNOSIS — H91.93 BILATERAL HEARING LOSS, UNSPECIFIED HEARING LOSS TYPE: ICD-10-CM

## 2023-09-13 DIAGNOSIS — C85.9A LYMPHOMA IN REMISSION: ICD-10-CM

## 2023-09-13 DIAGNOSIS — E03.9 HYPOTHYROIDISM, UNSPECIFIED TYPE: ICD-10-CM

## 2023-09-13 DIAGNOSIS — M81.0 OSTEOPOROSIS WITHOUT CURRENT PATHOLOGICAL FRACTURE, UNSPECIFIED OSTEOPOROSIS TYPE: ICD-10-CM

## 2023-09-13 PROCEDURE — 99214 OFFICE O/P EST MOD 30 MIN: CPT | Mod: 25 | Performed by: PHYSICIAN ASSISTANT

## 2023-09-13 PROCEDURE — G0439 PPPS, SUBSEQ VISIT: HCPCS | Performed by: PHYSICIAN ASSISTANT

## 2023-09-13 ASSESSMENT — ENCOUNTER SYMPTOMS
HEMATURIA: 0
NERVOUS/ANXIOUS: 0
FREQUENCY: 0
HEARTBURN: 0
COUGH: 0
MYALGIAS: 0
SORE THROAT: 0
FEVER: 0
PARESTHESIAS: 0
CONSTIPATION: 0
SHORTNESS OF BREATH: 0
BREAST MASS: 0
HEMATOCHEZIA: 0
WEAKNESS: 0
ARTHRALGIAS: 0
JOINT SWELLING: 0
NAUSEA: 0
DIZZINESS: 0
CHILLS: 0
HEADACHES: 0
EYE PAIN: 0
DYSURIA: 0
DIARRHEA: 0
PALPITATIONS: 0
ABDOMINAL PAIN: 0

## 2023-09-13 ASSESSMENT — ACTIVITIES OF DAILY LIVING (ADL): CURRENT_FUNCTION: NO ASSISTANCE NEEDED

## 2023-09-13 NOTE — PATIENT INSTRUCTIONS
Please call [phone] to schedule the mammogram    Referral placed for hearing evaluation, they will call you to schedule.       Patient Education   Personalized Prevention Plan  You are due for the preventive services outlined below.  Your care team is available to assist you in scheduling these services.  If you have already completed any of these items, please share that information with your care team to update in your medical record.  Health Maintenance Due   Topic Date Due    COVID-19 Vaccine (6 - Pfizer series) 03/06/2023    Annual Wellness Visit  07/14/2023    ANNUAL REVIEW OF HM ORDERS  07/14/2023    Flu Vaccine (1) 09/01/2023    Mammogram  08/17/2023

## 2023-09-13 NOTE — PROGRESS NOTES
"SUBJECTIVE:   Eleazar is a 68 year old who presents for Preventive Visit.      Are you in the first 12 months of your Medicare coverage?  No    Healthy Habits:     In general, how would you rate your overall health?  Excellent    Frequency of exercise:  2-3 days/week    Do you usually eat at least 4 servings of fruit and vegetables a day, include whole grains    & fiber and avoid regularly eating high fat or \"junk\" foods?  No    Taking medications regularly:  Yes    Medication side effects:  None    Ability to successfully perform activities of daily living:  No assistance needed    Home Safety:  Lack of grab bars in the bathroom    In the past 6 months, have you been bothered by leaking of urine?  No    In general, how would you rate your overall mental or emotional health?  Excellent    Additional concerns today:  No    Patient arrived for Annual Medicare Physical, undressing for breast exam. Patient would also like to discuss having thyroid recheck. Patient had dealt with a few episodes of bowel obstructions over the last winter but this has improved, she has been doing well recently.      Have you ever done Advance Care Planning? (For example, a Health Directive, POLST, or a discussion with a medical provider or your loved ones about your wishes): Yes, advance care planning is on file.       Fall risk  Fallen 2 or more times in the past year?: No  Any fall with injury in the past year?: No    Cognitive Screening   1) Repeat 3 items (Leader, Season, Table)    2) Clock draw: NORMAL  3) 3 item recall: Recalls 3 objects  Results: 3 items recalled: COGNITIVE IMPAIRMENT LESS LIKELY    Mini-CogTM Copyright FRED Irby. Licensed by the author for use in [facility]; reprinted with permission (uriel@.[facility]). All rights reserved.      Do you have sleep apnea, excessive snoring or daytime drowsiness? : no    Reviewed and updated as needed this visit by clinical staff    Allergies               Reviewed and updated " as needed this visit by Provider                 Social History     Tobacco Use    Smoking status: Never    Smokeless tobacco: Never   Substance Use Topics    Alcohol use: No             9/12/2023    12:29 PM   Alcohol Use   Prescreen: >3 drinks/day or >7 drinks/week? Not Applicable     Do you have a current opioid prescription? No  Do you use any other controlled substances or medications that are not prescribed by a provider? None          Hypothyroidism Follow-up    Since last visit, patient describes the following symptoms: Weight stable, no hair loss, no skin changes, no constipation, no loose stools    Current providers sharing in care for this patient include:   Patient Care Team:  Cheryl Serrano PA-C as PCP - General (Family Medicine)  Cheryl Serrano PA-C as Assigned PCP  Tyshawn Mendoza MD as Hospitalist (Internal Medicine)  Saida Castro APRN CNP as Nurse Practitioner    The following health maintenance items are reviewed in Epic and correct as of today:  Health Maintenance   Topic Date Due    COVID-19 Vaccine (6 - Pfizer series) 03/06/2023    MEDICARE ANNUAL WELLNESS VISIT  07/14/2023    INFLUENZA VACCINE (1) 09/01/2023    MAMMO SCREENING  08/17/2023    TSH W/FREE T4 REFLEX  01/31/2024    ANNUAL REVIEW OF HM ORDERS  09/13/2024    FALL RISK ASSESSMENT  09/13/2024    DTAP/TDAP/TD IMMUNIZATION (2 - Td or Tdap) 03/16/2025    LIPID  07/14/2027    ADVANCE CARE PLANNING  09/13/2028    COLORECTAL CANCER SCREENING  03/16/2033    DEXA  02/18/2036    HEPATITIS C SCREENING  Completed    PHQ-2 (once per calendar year)  Completed    Pneumococcal Vaccine: 65+ Years  Completed    ZOSTER IMMUNIZATION  Completed    IPV IMMUNIZATION  Aged Out    HPV IMMUNIZATION  Aged Out    MENINGITIS IMMUNIZATION  Aged Out    PAP  Discontinued     Labs reviewed in EPIC  BP Readings from Last 3 Encounters:   09/13/23 114/60   03/16/23 110/74   02/20/23 120/68    Wt Readings from Last 3 Encounters:   09/13/23 61.7 kg (136  lb)   03/16/23 60.8 kg (134 lb)   02/20/23 61.2 kg (134 lb 14.4 oz)                      FHS-7:       7/7/2022     2:17 PM 8/17/2022    10:13 AM 11/21/2022    10:07 AM 9/12/2023    12:18 PM   Breast CA Risk Assessment (FHS-7)   Did any of your first-degree relatives have breast or ovarian cancer? Yes Yes No Yes   Did any of your relatives have bilateral breast cancer? No No No No   Did any man in your family have breast cancer? No No No No   Did any woman in your family have breast and ovarian cancer? No No No No   Did any woman in your family have breast cancer before age 50 y? No No No No   Do you have 2 or more relatives with breast and/or ovarian cancer? No No No No   Do you have 2 or more relatives with breast and/or bowel cancer? No No No No       Mammogram Screening: Recommended annual mammography  Pertinent mammograms are reviewed under the imaging tab.    Review of Systems   Constitutional:  Negative for chills and fever.   HENT:  Negative for congestion, ear pain, hearing loss and sore throat.    Eyes:  Negative for pain and visual disturbance.   Respiratory:  Negative for cough and shortness of breath.    Cardiovascular:  Negative for chest pain, palpitations and peripheral edema.   Gastrointestinal:  Negative for abdominal pain, constipation, diarrhea, heartburn, hematochezia and nausea.   Breasts:  Negative for tenderness, breast mass and discharge.   Genitourinary:  Negative for dysuria, frequency, genital sores, hematuria, pelvic pain, urgency, vaginal bleeding and vaginal discharge.   Musculoskeletal:  Negative for arthralgias, joint swelling and myalgias.   Skin:  Negative for rash.   Neurological:  Negative for dizziness, weakness, headaches and paresthesias.   Psychiatric/Behavioral:  Negative for mood changes. The patient is not nervous/anxious.      Constitutional, HEENT, cardiovascular, pulmonary, GI, , musculoskeletal, neuro, skin, endocrine and psych systems are negative, except as  "otherwise noted.    OBJECTIVE:   /60 (BP Location: Left arm, Patient Position: Chair, Cuff Size: Adult Regular)   Pulse 76   Temp 97.6  F (36.4  C) (Tympanic)   Resp 16   Ht 1.645 m (5' 4.76\")   Wt 61.7 kg (136 lb)   SpO2 96%   BMI 22.80 kg/m   Estimated body mass index is 22.8 kg/m  as calculated from the following:    Height as of this encounter: 1.645 m (5' 4.76\").    Weight as of this encounter: 61.7 kg (136 lb).  Physical Exam  GENERAL APPEARANCE: healthy, alert and no distress  EYES: Eyes grossly normal to inspection, PERRL and conjunctivae and sclerae normal  HENT: ear canals and TM's normal, nose and mouth without ulcers or lesions, oropharynx clear and oral mucous membranes moist  NECK: no adenopathy, no asymmetry, masses, or scars and thyroid normal to palpation  RESP: lungs clear to auscultation - no rales, rhonchi or wheezes  BREAST: normal without masses, tenderness or nipple discharge and no palpable axillary masses or adenopathy  CV: regular rate and rhythm, normal S1 S2, no S3 or S4, no murmur, click or rub, no peripheral edema and peripheral pulses strong  ABDOMEN: soft, nontender, no hepatosplenomegaly, no masses and bowel sounds normal  MS: no musculoskeletal defects are noted and gait is age appropriate without ataxia  SKIN: no suspicious lesions or rashes  NEURO: Normal strength and tone, sensory exam grossly normal, mentation intact and speech normal  PSYCH: mentation appears normal and affect normal/bright    Diagnostic Test Results:  Labs reviewed in Epic    ASSESSMENT / PLAN:   (Z00.00) Encounter for Medicare annual wellness exam  (primary encounter diagnosis)  Comment:     HEALTH CARE MAINTENANCE              Reviewed USPTF recommendations and anticipatory guidance.              See orders.     Plan: Comprehensive metabolic panel (BMP + Alb, Alk         Phos, ALT, AST, Total. Bili, TP)            (Z12.31) Visit for screening mammogram  Comment: I discussed in detail with Eleazar DODGE" Cortez the importance of breast cancer screening through monthly self breast exams and annual breast exams in the clinic.    Plan: MA SCREENING DIGITAL BILAT - Future  (s+30)            (Z13.6) CARDIOVASCULAR SCREENING; LDL GOAL LESS THAN 160  Comment: due to recheck fasting labs, last lipid panel trending up.  Scheduled fasting lab only appt  Plan: Lipid panel reflex to direct LDL Fasting            (C85.90) Lymphoma in remission (H)  Comment: will monitor CBC  Plan: CBC with platelets            (E03.9) Hypothyroidism, unspecified type  Comment: due to recheck levels and will refill if in balance.   Plan: TSH            (M81.0) Osteoporosis without current pathological fracture, unspecified osteoporosis type  Comment: doing well on Boniva (has been on this for about 2 years).  Continue for 5 years.  Will do DEXA next year.   Plan:     (H91.93) Bilateral hearing loss, unspecified hearing loss type  Comment: She c/o decreased hearing mainly in crowded rooms, such as [employer].  Further hearing evaluation advised.   Plan: Adult Audiology  Referral            Patient has been advised of split billing requirements and indicates understanding: Yes      COUNSELING:  Reviewed preventive health counseling, as reflected in patient instructions       Regular exercise       Healthy diet/nutrition        She reports that she has never smoked. She has never used smokeless tobacco.      Appropriate preventive services were discussed with this patient, including applicable screening as appropriate for cardiovascular disease, diabetes, osteopenia/osteoporosis, and glaucoma.  As appropriate for age/gender, discussed screening for colorectal cancer, prostate cancer, breast cancer, and cervical cancer. Checklist reviewing preventive services available has been given to the patient.    Reviewed patients plan of care and provided an AVS. The Basic Care Plan (routine screening as documented in Health Maintenance) for Eleazar meets the  Care Plan requirement. This Care Plan has been established and reviewed with the Patient.          [facility]    Identified Health Risks:  I have reviewed Opioid Use Disorder and Substance Use Disorder risk factors and made any needed referrals.

## 2023-09-14 ENCOUNTER — MYC MEDICAL ADVICE (OUTPATIENT)
Dept: FAMILY MEDICINE | Facility: CLINIC | Age: 68
End: 2023-09-14
Payer: COMMERCIAL

## 2023-09-15 ENCOUNTER — LAB (OUTPATIENT)
Dept: LAB | Facility: CLINIC | Age: 68
End: 2023-09-15
Payer: COMMERCIAL

## 2023-09-15 DIAGNOSIS — C85.9A LYMPHOMA IN REMISSION: ICD-10-CM

## 2023-09-15 DIAGNOSIS — E03.9 HYPOTHYROIDISM, UNSPECIFIED TYPE: ICD-10-CM

## 2023-09-15 DIAGNOSIS — Z13.6 CARDIOVASCULAR SCREENING; LDL GOAL LESS THAN 160: ICD-10-CM

## 2023-09-15 DIAGNOSIS — Z00.00 ENCOUNTER FOR MEDICARE ANNUAL WELLNESS EXAM: ICD-10-CM

## 2023-09-15 LAB
ALBUMIN SERPL BCG-MCNC: 4.2 G/DL (ref 3.5–5.2)
ALP SERPL-CCNC: 39 U/L (ref 35–104)
ALT SERPL W P-5'-P-CCNC: 9 U/L (ref 0–50)
ANION GAP SERPL CALCULATED.3IONS-SCNC: 9 MMOL/L (ref 7–15)
AST SERPL W P-5'-P-CCNC: 25 U/L (ref 0–45)
BILIRUB SERPL-MCNC: 0.6 MG/DL
BUN SERPL-MCNC: 17.3 MG/DL (ref 8–23)
CALCIUM SERPL-MCNC: 9.2 MG/DL (ref 8.8–10.2)
CHLORIDE SERPL-SCNC: 103 MMOL/L (ref 98–107)
CHOLEST SERPL-MCNC: 259 MG/DL
CREAT SERPL-MCNC: 0.99 MG/DL (ref 0.51–0.95)
DEPRECATED HCO3 PLAS-SCNC: 27 MMOL/L (ref 22–29)
EGFRCR SERPLBLD CKD-EPI 2021: 62 ML/MIN/1.73M2
ERYTHROCYTE [DISTWIDTH] IN BLOOD BY AUTOMATED COUNT: 12.9 % (ref 10–15)
GLUCOSE SERPL-MCNC: 91 MG/DL (ref 70–99)
HCT VFR BLD AUTO: 39.3 % (ref 35–47)
HDLC SERPL-MCNC: 89 MG/DL
HGB BLD-MCNC: 13 G/DL (ref 11.7–15.7)
LDLC SERPL CALC-MCNC: 151 MG/DL
MCH RBC QN AUTO: 31.6 PG (ref 26.5–33)
MCHC RBC AUTO-ENTMCNC: 33.1 G/DL (ref 31.5–36.5)
MCV RBC AUTO: 96 FL (ref 78–100)
NONHDLC SERPL-MCNC: 170 MG/DL
PLATELET # BLD AUTO: 267 10E3/UL (ref 150–450)
POTASSIUM SERPL-SCNC: 4.7 MMOL/L (ref 3.4–5.3)
PROT SERPL-MCNC: 6.9 G/DL (ref 6.4–8.3)
RBC # BLD AUTO: 4.11 10E6/UL (ref 3.8–5.2)
SODIUM SERPL-SCNC: 139 MMOL/L (ref 136–145)
TRIGL SERPL-MCNC: 97 MG/DL
TSH SERPL DL<=0.005 MIU/L-ACNC: 4.86 UIU/ML (ref 0.3–4.2)
WBC # BLD AUTO: 4.4 10E3/UL (ref 4–11)

## 2023-09-15 PROCEDURE — 85027 COMPLETE CBC AUTOMATED: CPT

## 2023-09-15 PROCEDURE — 80053 COMPREHEN METABOLIC PANEL: CPT

## 2023-09-15 PROCEDURE — 80061 LIPID PANEL: CPT

## 2023-09-15 PROCEDURE — 84443 ASSAY THYROID STIM HORMONE: CPT

## 2023-09-15 PROCEDURE — 36415 COLL VENOUS BLD VENIPUNCTURE: CPT

## 2023-09-18 ENCOUNTER — MYC MEDICAL ADVICE (OUTPATIENT)
Dept: FAMILY MEDICINE | Facility: CLINIC | Age: 68
End: 2023-09-18
Payer: COMMERCIAL

## 2023-09-18 RX ORDER — LEVOTHYROXINE SODIUM 75 UG/1
75 TABLET ORAL DAILY
Qty: 90 TABLET | Refills: 0 | Status: SHIPPED | OUTPATIENT
Start: 2023-09-18 | End: 2024-01-05

## 2023-10-03 ENCOUNTER — TRANSFERRED RECORDS (OUTPATIENT)
Dept: HEALTH INFORMATION MANAGEMENT | Facility: CLINIC | Age: 68
End: 2023-10-03
Payer: COMMERCIAL

## 2023-10-17 ENCOUNTER — HOSPITAL ENCOUNTER (OUTPATIENT)
Dept: MAMMOGRAPHY | Facility: CLINIC | Age: 68
Discharge: HOME | End: 2023-10-17
Attending: PHYSICIAN ASSISTANT | Admitting: PHYSICIAN ASSISTANT
Payer: COMMERCIAL

## 2023-10-17 DIAGNOSIS — Z12.31 VISIT FOR SCREENING MAMMOGRAM: ICD-10-CM

## 2023-10-17 PROCEDURE — 77067 SCR MAMMO BI INCL CAD: CPT

## 2023-10-30 ENCOUNTER — PATIENT OUTREACH (OUTPATIENT)
Dept: GASTROENTEROLOGY | Facility: CLINIC | Age: 68
End: 2023-10-30
Payer: COMMERCIAL

## 2023-11-10 ENCOUNTER — OFFICE VISIT (OUTPATIENT)
Dept: AUDIOLOGY | Facility: CLINIC | Age: 68
End: 2023-11-10
Payer: COMMERCIAL

## 2023-11-10 DIAGNOSIS — H91.93 BILATERAL HEARING LOSS, UNSPECIFIED HEARING LOSS TYPE: ICD-10-CM

## 2023-11-10 DIAGNOSIS — H90.3 SENSORINEURAL HEARING LOSS, BILATERAL: Primary | ICD-10-CM

## 2023-11-10 PROCEDURE — 92557 COMPREHENSIVE HEARING TEST: CPT

## 2023-11-10 PROCEDURE — 92550 TYMPANOMETRY & REFLEX THRESH: CPT

## 2023-11-10 NOTE — PROGRESS NOTES
AUDIOLOGY REPORT    SUBJECTIVE:  Eleazar Cortez is a 68 year old female who was seen in the Audiology Clinic at the [facility] for audiologic evaluation, referred by Cheryl Serrano PA-C. The patient reports difficulty with communication in a variety of listening situations and would like a baseline audiogram. The patient denies dizziness, bilateral tinnitus, bilateral otalgia, bilateral drainage, bilateral aural fullness, history of ear surgeries and family history of hearing loss. The patient does report a history of noise exposure through playing the harp- starting when she was in 7th grade. Pelon was not accompanied to today's appointment     OBJECTIVE:  Abuse Screening:  Do you feel unsafe at home or work/school? No  Do you feel threatened by someone? No  Does anyone try to keep you from having contact with others, or doing things outside of your home? No  Physical signs of abuse present? No     Fall Risk Screen:  1. Have you fallen two or more times in the past year? No  2. Have you fallen and had an injury in the past year? No    Otoscopic exam indicates ears are clear of cerumen bilaterally     Pure Tone Thresholds assessed using conventional audiometry with good  reliability from 250-8000 Hz bilaterally using insert earphones and circumaural headphones     RIGHT:  normal through 6000 Hz sloping to severe sensorineural hearing loss    LEFT:    normal through 4000 Hz sloping to severe sensorineural hearing loss    Tympanogram:    RIGHT: normal eardrum mobility    LEFT:   normal eardrum mobility    Reflexes (reported by stimulus ear):  RIGHT: Ipsilateral is present at normal levels  RIGHT: Contralateral is elevated at normal levels  LEFT:   Ipsilateral is present at normal levels  LEFT:   Contralateral is present at normal levels      Speech Reception Threshold:    RIGHT: 10 dB HL    LEFT:   10 dB HL  Word Recognition Score:     RIGHT: 100% at 50 dB HL using NU-6 recorded word list.     LEFT:   100% at 50 dB HL using NU-6 recorded word list.      ASSESSMENT:   Normal sloping to severe sensorineural hearing loss at 8000 Hz in both ears. Today s results were discussed with the patient in detail.     PLAN:  Patient was counseled regarding hearing loss and impact on communication. It is recommended that the patient return every 2-3 years for an updated audiogram, sooner with changes/concerns.  Please call this clinic with questions regarding these results or recommendations.        Missy Ho, CCC-A  Licensed Audiologist  [place] #[id number]      11/10/23

## 2023-11-13 ENCOUNTER — TRANSFERRED RECORDS (OUTPATIENT)
Dept: HEALTH INFORMATION MANAGEMENT | Facility: CLINIC | Age: 68
End: 2023-11-13
Payer: COMMERCIAL

## 2023-11-22 DIAGNOSIS — M81.0 OSTEOPOROSIS WITHOUT CURRENT PATHOLOGICAL FRACTURE, UNSPECIFIED OSTEOPOROSIS TYPE: ICD-10-CM

## 2023-11-22 RX ORDER — IBANDRONATE SODIUM 150 MG/1
1 TABLET, FILM COATED ORAL
Qty: 3 TABLET | Refills: 3 | Status: SHIPPED | OUTPATIENT
Start: 2023-11-22

## 2023-12-21 ENCOUNTER — LAB (OUTPATIENT)
Dept: LAB | Facility: CLINIC | Age: 68
End: 2023-12-21
Payer: COMMERCIAL

## 2023-12-21 DIAGNOSIS — E03.9 HYPOTHYROIDISM, UNSPECIFIED TYPE: ICD-10-CM

## 2023-12-21 PROCEDURE — 84443 ASSAY THYROID STIM HORMONE: CPT

## 2023-12-21 PROCEDURE — 36415 COLL VENOUS BLD VENIPUNCTURE: CPT

## 2023-12-22 LAB — TSH SERPL DL<=0.005 MIU/L-ACNC: 1.87 UIU/ML (ref 0.3–4.2)

## 2024-01-05 DIAGNOSIS — E03.9 HYPOTHYROIDISM, UNSPECIFIED TYPE: ICD-10-CM

## 2024-01-05 RX ORDER — LEVOTHYROXINE SODIUM 75 UG/1
75 TABLET ORAL DAILY
Qty: 90 TABLET | Refills: 2 | Status: SHIPPED | OUTPATIENT
Start: 2024-01-05

## 2024-05-15 ENCOUNTER — TRANSFERRED RECORDS (OUTPATIENT)
Dept: HEALTH INFORMATION MANAGEMENT | Facility: CLINIC | Age: 69
End: 2024-05-15

## 2024-08-13 ENCOUNTER — TELEPHONE (OUTPATIENT)
Dept: FAMILY MEDICINE | Facility: CLINIC | Age: 69
End: 2024-08-13

## 2024-08-13 NOTE — TELEPHONE ENCOUNTER
Patient Quality Outreach    Patient is due for the following:   Physical Annual Wellness Visit    Next Steps:   Schedule a Annual Wellness Visit    Type of outreach:    Sent [employer] message.      Questions for provider review:    None           Sushma Motta MA  Chart routed to Provider.

## 2024-09-17 ENCOUNTER — PATIENT OUTREACH (OUTPATIENT)
Dept: CARE COORDINATION | Facility: CLINIC | Age: 69
End: 2024-09-17
Payer: COMMERCIAL

## 2024-10-04 SDOH — HEALTH STABILITY: PHYSICAL HEALTH: ON AVERAGE, HOW MANY MINUTES DO YOU ENGAGE IN EXERCISE AT THIS LEVEL?: 30 MIN

## 2024-10-04 SDOH — HEALTH STABILITY: PHYSICAL HEALTH: ON AVERAGE, HOW MANY DAYS PER WEEK DO YOU ENGAGE IN MODERATE TO STRENUOUS EXERCISE (LIKE A BRISK WALK)?: 2 DAYS

## 2024-10-04 ASSESSMENT — SOCIAL DETERMINANTS OF HEALTH (SDOH): HOW OFTEN DO YOU GET TOGETHER WITH FRIENDS OR RELATIVES?: THREE TIMES A WEEK

## 2024-10-09 ENCOUNTER — OFFICE VISIT (OUTPATIENT)
Dept: FAMILY MEDICINE | Facility: CLINIC | Age: 69
End: 2024-10-09
Attending: PHYSICIAN ASSISTANT
Payer: COMMERCIAL

## 2024-10-09 VITALS
DIASTOLIC BLOOD PRESSURE: 68 MMHG | HEIGHT: 65 IN | HEART RATE: 83 BPM | BODY MASS INDEX: 23.16 KG/M2 | SYSTOLIC BLOOD PRESSURE: 112 MMHG | RESPIRATION RATE: 16 BRPM | TEMPERATURE: 98.3 F | WEIGHT: 139 LBS | OXYGEN SATURATION: 99 %

## 2024-10-09 DIAGNOSIS — E03.9 HYPOTHYROIDISM, UNSPECIFIED TYPE: ICD-10-CM

## 2024-10-09 DIAGNOSIS — M75.42 IMPINGEMENT SYNDROME, SHOULDER, LEFT: ICD-10-CM

## 2024-10-09 DIAGNOSIS — C85.9A LYMPHOMA IN REMISSION: ICD-10-CM

## 2024-10-09 DIAGNOSIS — Z13.6 CARDIOVASCULAR SCREENING; LDL GOAL LESS THAN 160: ICD-10-CM

## 2024-10-09 DIAGNOSIS — Z00.00 ENCOUNTER FOR MEDICARE ANNUAL WELLNESS EXAM: Primary | ICD-10-CM

## 2024-10-09 DIAGNOSIS — Z12.31 VISIT FOR SCREENING MAMMOGRAM: ICD-10-CM

## 2024-10-09 DIAGNOSIS — M81.0 OSTEOPOROSIS WITHOUT CURRENT PATHOLOGICAL FRACTURE, UNSPECIFIED OSTEOPOROSIS TYPE: ICD-10-CM

## 2024-10-09 DIAGNOSIS — C44.91 SUPERFICIAL BASAL CELL CARCINOMA: ICD-10-CM

## 2024-10-09 PROCEDURE — G0439 PPPS, SUBSEQ VISIT: HCPCS | Performed by: PHYSICIAN ASSISTANT

## 2024-10-09 PROCEDURE — 99214 OFFICE O/P EST MOD 30 MIN: CPT | Mod: 25 | Performed by: PHYSICIAN ASSISTANT

## 2024-10-09 NOTE — PROGRESS NOTES
Preventive Care Visit  [facility] JACIEL Serrano PA-C, Family Medicine  Oct 9, 2024      Assessment & Plan     Encounter for Medicare annual wellness exam      HEALTH CARE MAINTENANCE              Reviewed USPTF recommendations and anticipatory guidance.              See orders.   Patient instructions:  Please call [facility] Sched[facility] at [phone]  to set up the DEXA scan.  We may consider stopping the boniva as it has been 5+ years.  May consider an infusion such as prolia.      Please call [phone] to schedule the mammogram    I recommend a covid booster in 2-3 months.   Flu shot once you are feeling better (in 2-3 weeks).      Schedule fasting lab only appt in 2-3 weeks.      I do not have records from [facility], we'll try to get those on file.     Lymphoma in remission  Monitor CBC, no longer following with specialist.  - Comprehensive metabolic panel (BMP + Alb, Alk Phos, ALT, AST, Total. Bili, TP); Future  - CBC with platelets; Future    Hypothyroidism, unspecified type  Will check labs in a few weeks (on day 7 of covid, it may affect the levels slightly).   - TSH; Future    Superficial basal cell carcinoma  Managed by derm.     Impingement syndrome, shoulder, left  Discussed the etiology and pathophysiology of this condition.  Recommend we start with conservative treatment, mainly rest and avoid any overhead reaching.   May consider NSAIDs, physical therapy (progressive resistive exercises (PRE), internal rotation (IR), external rotation (ER) with no elevation over 30 degrees) and/or intra-articular joint injection.      - Orthopedic  Referral; Future    Visit for screening mammogram  I discussed in detail with Eleazar Cortez the importance of breast cancer screening through monthly self breast exams and annual breast exams in the clinic.    - MA Screening Bilateral w/ Jorge; Future    CARDIOVASCULAR SCREENING; LDL GOAL LESS THAN 160  Due for screening.   -  Lipid panel reflex to direct LDL Fasting; Future    Osteoporosis without current pathological fracture, unspecified osteoporosis type  Will repeat dexa, may consider stopping bisphosphonate at this point (has been on for 5+ years).  She is not at a high risk of fracture, so likely does not need an additional agent at this time.      - DX Bone Density; Future    Patient has been advised of split billing requirements and indicates understanding: Yes        Counseling  Appropriate preventive services were addressed with this patient via screening, questionnaire, or discussion as appropriate for fall prevention, nutrition, physical activity, Tobacco-use cessation, social engagement, weight loss and cognition.  Checklist reviewing preventive services available has been given to the patient.  Reviewed patient's diet, addressing concerns and/or questions.   She is at risk for lack of exercise and has been provided with information to increase physical activity for the benefit of her well-being.   Discussed possible causes of fatigue. The patient was provided with written information regarding signs of hearing loss.           Subjective   Eleazar is a 69 year old, presenting for the following:  Physical        10/9/2024     1:28 PM   Additional Questions   Roomed by Sushma   Accompanied by Self         10/9/2024     1:28 PM   Patient Reported Additional Medications   Patient reports taking the following new medications NA         Health Care Directive  Patient has a Health Care Directive on file  Advance care planning document is on file and is current.    HPI      Patient with history of Hypothyroidism arrived for Annual Medicare Physical, undressing for breast exam.    Patient is not fasting for lab work.     Patient tested positive for Covid on Tuesday, was prescribed Paxlovid and has been improving, last pill was taken yesterday.     Has been on boniva for 5+ years.     Has a spot on her nose that is scheduled for [facility]  with her dermatologist.  She has a h/o basal cell carcinoma in a few different spots.     She was recently diagnosed with idiopathic neuropathy.  Work up through podiatry and neurologist at [facility].  Had EMG and lab work.  No etiology found.  Symptoms are mild, she is not requesting medication.  Mainly has numbness sensation in foot at times.     Near the end of the exam she mentions left shoulder pain that has been present for months.       Hypothyroidism Follow-up    Since last visit, patient describes the following symptoms: fatigue        10/4/2024   General Health   How would you rate your overall physical health? Excellent   Feel stress (tense, anxious, or unable to sleep) Not at all            10/4/2024   Nutrition   Diet: Regular (no restrictions)            10/4/2024   Exercise   Days per week of moderate/strenous exercise 2 days   Average minutes spent exercising at this level 30 min      (!) EXERCISE CONCERN      10/4/2024   Social Factors   Frequency of gathering with friends or relatives Three times a week   Worry food won't last until get money to buy more No   Food not last or not have enough money for food? No   Do you have housing? (Housing is defined as stable permanent housing and does not include staying ouside in a car, in a tent, in an abandoned building, in an overnight shelter, or couch-surfing.) Yes   Are you worried about losing your housing? No   Lack of transportation? No   Unable to get utilities (heat,electricity)? No            10/4/2024   Fall Risk   Fallen 2 or more times in the past year? No    No   Trouble with walking or balance? No    Yes       Multiple values from one day are sorted in reverse-chronological order          10/4/2024   Activities of Daily Living- Home Safety   Needs help with the following daily activites None of the above   Safety concerns in the home None of the above            10/4/2024   Dental   Dentist two times every year? Yes            10/4/2024   Hearing  Screening   Hearing concerns? (!) IT'S HARDER TO UNDERSTAND WOMEN'S VOICES THAN MEN'S VOICES.    (!) IT'S HARD TO FOLLOW A CONVERSATION IN A NOISY RESTAURANT OR CROWDED ROOM.       Multiple values from one day are sorted in reverse-chronological order         10/4/2024   Driving Risk Screening   Patient/family members have concerns about driving No            10/4/2024   General Alertness/Fatigue Screening   Have you been more tired than usual lately? (!) YES            10/4/2024   Urinary Incontinence Screening   Bothered by leaking urine in past 6 months No            10/4/2024   TB Screening   Were you born outside of the [place]? No            Today's PHQ-2 Score:       10/8/2024     3:05 PM   PHQ-2 ( 1999 Pfizer)   Q1: Little interest or pleasure in doing things 1   Q2: Feeling down, depressed or hopeless 0   PHQ-2 Score 1   Q1: Little interest or pleasure in doing things Several days   Q2: Feeling down, depressed or hopeless Not at all   PHQ-2 Score 1           10/4/2024   Substance Use   Alcohol more than 3/day or more than 7/wk Not Applicable   Do you have a current opioid prescription? No   How severe/bad is pain from 1 to 10? 0/10 (No Pain)   Do you use any other substances recreationally? No        Social History     Tobacco Use    Smoking status: Never    Smokeless tobacco: Never   Vaping Use    Vaping status: Never Used   Substance Use Topics    Alcohol use: No    Drug use: No           10/17/2023   LAST FHS-7 RESULTS   1st degree relative breast or ovarian cancer No   Any relative bilateral breast cancer No   Any male have breast cancer No   Any ONE woman have BOTH breast AND ovarian cancer No   Any woman with breast cancer before 50yrs No   2 or more relatives with breast AND/OR ovarian cancer No   2 or more relatives with breast AND/OR bowel cancer No           Mammogram Screening - Mammogram every 1-2 years updated in Health Maintenance based on mutual decision making      History of abnormal Pap smear:  No - age 65 or older with adequate negative prior screening test results (3 consecutive negative cytology results, 2 consecutive negative cotesting results, or 2 consecutive negative HrHPV test results within 10 years, with the most recent test occurring within the recommended screening interval for the test used)        Latest Ref Rng & Units 2/10/2021    11:42 AM 2/10/2021    11:40 AM 8/8/2017     2:56 PM   PAP / HPV   PAP (Historical)  NIL   NIL    HPV 16 DNA NEG^Negative  Negative     HPV 18 DNA NEG^Negative  Negative     Other HR HPV NEG^Negative  Negative       ASCVD Risk   The 10-year ASCVD risk score (Clark DOHERTY, et al., 2019) is: 5.8%    Values used to calculate the score:      Age: 69 years      Sex: Female      Is Non- : No      Diabetic: No      Tobacco smoker: No      Systolic Blood Pressure: 112 mmHg      Is BP treated: No      HDL Cholesterol: 89 mg/dL      Total Cholesterol: 259 mg/dL            Reviewed and updated as needed this visit by Provider                    Past Medical History:   Diagnosis Date    Arthritis 2014    in knee    BCC (basal cell carcinoma), face 2009    right lateral nose.  removed via Mohs    Hypothyroidism 10/11/2012    Osteoporosis     Other malignant lymphomas, unspecified site, extranodal and solid organ sites 2002    Non-Hodgkin's Lymphoma    SBO (small bowel obstruction) (H) 12/09/2022    Scoliosis (and kyphoscoliosis), idiopathic     Small bowel obstruction (H) 01/31/2023    Uncomplicated asthma childhood    Rarely... haven't noticed for a long time     Past Surgical History:   Procedure Laterality Date    ARTHROPLASTY KNEE Left 12/05/2022    Procedure: LEFT TOTAL KNEE ARTHROPLASTY;  Surgeon: Daren Coleman MD;  Location: [facility] Main OR    ARTHROPLASTY KNEE Right     BIOPSY  2002, 2011?    leg, nose    COLONOSCOPY  11/09/2012    Procedure: COLONOSCOPY;  Colonoscopy;  Surgeon: Drew Perdue MD;  Location: [place]     COLONOSCOPY N/A 3/16/2023    Procedure: COLONOSCOPY;  Surgeon: Courtney Kelly MD;  Location:  GI    ORTHOPEDIC SURGERY Left 2012, 2014    broke radius by wrist... plate and 9 screws put in, arthrosc    SURGICAL HISTORY OF -   12/2001    Lymph Node Excision on left leg    SURGICAL HISTORY OF -   01/2009    BCC removal right nasal area    ZZC TOTAL KNEE ARTHROPLASTY Right 03/2015     Lab work is in process  Current providers sharing in care for this patient include:  Patient Care Team:  Cheryl Serrano PA-C as PCP - General (Family Medicine)  Cheryl Serrano PA-C as Assigned PCP  Tyshawn Mendoza MD as Hospitalist (Internal Medicine)  Saida Castro APRN CNP as Nurse Practitioner  Sushma Mendoza AuD as Audiologist (Audiology)    The following health maintenance items are reviewed in Epic and correct as of today:  Health Maintenance   Topic Date Due    INFLUENZA VACCINE (1) 09/01/2024    COVID-19 Vaccine (7 - 2024-25 season) 09/01/2024    ANNUAL REVIEW OF HM ORDERS  09/13/2024    MEDICARE ANNUAL WELLNESS VISIT  09/13/2024    MAMMO SCREENING  10/17/2024    TSH W/FREE T4 REFLEX  12/21/2024    DTAP/TDAP/TD IMMUNIZATION (2 - Td or Tdap) 03/16/2025    FALL RISK ASSESSMENT  10/09/2025    GLUCOSE  09/15/2026    ADVANCE CARE PLANNING  09/13/2028    LIPID  09/15/2028    RSV VACCINE (1 - 1-dose 75+ series) 08/02/2031    COLORECTAL CANCER SCREENING  03/16/2033    DEXA  02/18/2036    HEPATITIS C SCREENING  Completed    PHQ-2 (once per calendar year)  Completed    Pneumococcal Vaccine: 65+ Years  Completed    ZOSTER IMMUNIZATION  Completed    HPV IMMUNIZATION  Aged Out    MENINGITIS IMMUNIZATION  Aged Out    RSV MONOCLONAL ANTIBODY  Aged Out    PAP  Discontinued         Review of Systems  Constitutional, neuro, ENT, endocrine, pulmonary, cardiac, gastrointestinal, genitourinary, musculoskeletal, integument and psychiatric systems are negative, except as otherwise noted.     Objective    Exam  /68 (BP  "Location: Right arm, Patient Position: Chair, Cuff Size: Adult Regular)   Pulse 83   Temp 98.3  F (36.8  C) (Tympanic)   Resp 16   Ht 1.64 m (5' 4.57\")   Wt 63 kg (139 lb)   SpO2 99%   BMI 23.44 kg/m     Estimated body mass index is 23.44 kg/m  as calculated from the following:    Height as of this encounter: 1.64 m (5' 4.57\").    Weight as of this encounter: 63 kg (139 lb).    Physical Exam  GENERAL: alert and no distress  EYES: Eyes grossly normal to inspection, PERRL and conjunctivae and sclerae normal  HENT: ear canals and TM's normal, nose and mouth without ulcers or lesions  NECK: no adenopathy, no asymmetry, masses, or scars  RESP: lungs clear to auscultation - no rales, rhonchi or wheezes  BREAST: normal without masses, tenderness or nipple discharge and no palpable axillary masses or adenopathy  CV: regular rate and rhythm, normal S1 S2, no S3 or S4, no murmur, click or rub, no peripheral edema  ABDOMEN: soft, nontender, no hepatosplenomegaly, no masses and bowel sounds normal  MS: no gross musculoskeletal defects noted, no edema  SKIN: no suspicious lesions or rashes  NEURO: Normal strength and tone, mentation intact and speech normal  PSYCH: mentation appears normal, affect normal/bright  Inspection: No obvious deformity, asymmetry, muscle atrophy or abnormal motion noted.   Palpation:  No pain over AC or SC joint, acromion and subacromial space, bicipital groove and greater/less tubercles, scapular spine and adjacent musculature, cervical spine.    External/Internal rotation strength:  full  Abduction/Adduction strength: difficulty abducting left arm past 110 degrees  Biceps/Triceps strength: full  Neck examination: normal.   ROM/crepitus? normal  Capillary refills less than 2 seconds and radial pulses normal bilaterally.   Sensation intact bilateral fingers, hands and arms.             10/9/2024   Mini Cog   Clock Draw Score 2 Normal   3 Item Recall 3 objects recalled   Mini Cog Total Score 5    "              Signed Electronically by: Cheryl Serrano PA-C

## 2024-10-09 NOTE — PATIENT INSTRUCTIONS
Please call [facility] at [phone]  to set up the DEXA scan.  We may consider stopping the boniva as it has been 5+ years.  May consider an infusion such as prolia.      Please call [phone] to schedule the mammogram    I recommend a covid booster in 2-3 months.   Flu shot once you are feeling better (in 2-3 weeks).      Schedule fasting lab only appt in 2-3 weeks.      I do not have records from [facility], we'll try to get those on file.       Patient Education   Preventive Care Advice   This is general advice given by our system to help you stay healthy. However, your care team may have specific advice just for you. Please talk to your care team about your preventive care needs.  Nutrition  Eat 5 or more servings of fruits and vegetables each day.  Try wheat bread, brown rice and whole grain pasta (instead of white bread, rice, and pasta).  Get enough calcium and vitamin D. Check the label on foods and aim for 100% of the RDA (recommended daily allowance).  Lifestyle  Exercise at least 150 minutes each week  (30 minutes a day, 5 days a week).  Do muscle strengthening activities 2 days a week. These help control your weight and prevent disease.  No smoking.  Wear sunscreen to prevent skin cancer.  Have a dental exam and cleaning every 6 months.  Yearly exams  See your health care team every year to talk about:  Any changes in your health.  Any medicines your care team has prescribed.  Preventive care, family planning, and ways to prevent chronic diseases.  Shots (vaccines)   HPV shots (up to age 26), if you've never had them before.  Hepatitis B shots (up to age 59), if you've never had them before.  COVID-19 shot: Get this shot when it's due.  Flu shot: Get a flu shot every year.  Tetanus shot: Get a tetanus shot every 10 years.  Pneumococcal, hepatitis A, and RSV shots: Ask your care team if you need these based on your risk.  Shingles shot (for age 50 and up)  General health  tests  Diabetes screening:  Starting at age 35, Get screened for diabetes at least every 3 years.  If you are younger than age 35, ask your care team if you should be screened for diabetes.  Cholesterol test: At age 39, start having a cholesterol test every 5 years, or more often if advised.  Bone density scan (DEXA): At age 50, ask your care team if you should have this scan for osteoporosis (brittle bones).  Hepatitis C: Get tested at least once in your life.  STIs (sexually transmitted infections)  Before age 24: Ask your care team if you should be screened for STIs.  After age 24: Get screened for STIs if you're at risk. You are at risk for STIs (including HIV) if:  You are sexually active with more than one person.  You don't use condoms every time.  You or a partner was diagnosed with a sexually transmitted infection.  If you are at risk for HIV, ask about PrEP medicine to prevent HIV.  Get tested for HIV at least once in your life, whether you are at risk for HIV or not.  Cancer screening tests  Cervical cancer screening: If you have a cervix, begin getting regular cervical cancer screening tests starting at age 21.  Breast cancer scan (mammogram): If you've ever had breasts, begin having regular mammograms starting at age 40. This is a scan to check for breast cancer.  Colon cancer screening: It is important to start screening for colon cancer at age 45.  Have a colonoscopy test every 10 years (or more often if you're at risk) Or, ask your provider about stool tests like a FIT test every year or Cologuard test every 3 years.  To learn more about your testing options, visit:   .  For help making a decision, visit:   https://bit.ly/bn83278.  Prostate cancer screening test: If you have a prostate, ask your care team if a prostate cancer screening test (PSA) at age 55 is right for you.  Lung cancer screening: If you are a current or former smoker ages 50 to 80, ask your care team if ongoing lung cancer  screenings are right for you.  For informational purposes only. Not to replace the advice of your health care provider. Copyright   2023 [facility]. All rights reserved. Clinically reviewed by the [facility]. [employer] [id number] - REV 01/24.  Hearing Loss: Care Instructions  Overview     Hearing loss is a sudden or slow decrease in how well you hear. It can range from slight to profound. Permanent hearing loss can occur with aging. It also can happen when you are exposed long-term to loud noise. Examples include listening to loud music, riding motorcycles, or being around other loud machines.  Hearing loss can affect your work and home life. It can make you feel lonely or depressed. You may feel that you have lost your independence. But hearing aids and other devices can help you hear better and feel connected to others.  Follow-up care is a key part of your treatment and safety. Be sure to make and go to all appointments, and call your doctor if you are having problems. It's also a good idea to know your test results and keep a list of the medicines you take.  How can you care for yourself at home?  Avoid loud noises whenever possible. This helps keep your hearing from getting worse.  Always wear hearing protection around loud noises.  Wear a hearing aid as directed.  A professional can help you pick a hearing aid that will work best for you.  You can also get hearing aids over the counter for mild to moderate hearing loss.  Have hearing tests as your doctor suggests. They can show whether your hearing has changed. Your hearing aid may need to be adjusted.  Use other devices as needed. These may include:  Telephone amplifiers and hearing aids that can connect to a television, stereo, radio, or microphone.  Devices that use lights or vibrations. These alert you to the doorbell, a ringing telephone, or a baby monitor.  Television closed-captioning. This shows the words at the  "bottom of the screen. Most new TVs can do this.  TTY (text telephone). This lets you type messages back and forth on the telephone instead of talking or listening. These devices are also called TDD. When messages are typed on the keyboard, they are sent over the phone line to a receiving TTY. The message is shown on a monitor.  Use text messaging, social media, and email if it is hard for you to communicate by telephone.  Try to learn a listening technique called speechreading. It is not lipreading. You pay attention to people's gestures, expressions, posture, and tone of voice. These clues can help you understand what a person is saying. Face the person you are talking to, and have them face you. Make sure the lighting is good. You need to see the other person's face clearly.  Think about counseling if you need help to adjust to your hearing loss.  When should you call for help?  Watch closely for changes in your health, and be sure to contact your doctor if:    You think your hearing is getting worse.     You have new symptoms, such as dizziness or nausea.   Where can you learn more?  Go to https://www.Yub.net/patiented  Enter R798 in the search box to learn more about \"Hearing Loss: Care Instructions.\"  Current as of: September 27, 2023  Content Version: 14.2 2024 [employer].   Care instructions adapted under license by your healthcare professional. If you have questions about a medical condition or this instruction, always ask your healthcare professional. [employer] Incorporated disclaims any warranty or liability for your use of this information.    Learning About Sleeping Well  What does sleeping well mean?     Sleeping well means getting enough sleep to feel good and stay healthy. How much sleep is enough varies among people.  The number of hours you sleep and how you feel when you wake up are both important. If you do not feel refreshed, you probably need more sleep. Another sign of " "not getting enough sleep is feeling tired during the day.  Experts recommend that adults get at least 7 or more hours of sleep per day. Children and older adults need more sleep.  Why is getting enough sleep important?  Getting enough quality sleep is a basic part of good health. When your sleep suffers, your physical health, mood, and your thoughts can suffer too. You may find yourself feeling more grumpy or stressed. Not getting enough sleep also can lead to serious problems, including injury, accidents, anxiety, and depression.  What might cause poor sleeping?  Many things can cause sleep problems, including:  Changes to your sleep schedule.  Stress. Stress can be caused by fear about a single event, such as giving a speech. Or you may have ongoing stress, such as worry about work or school.  Depression, anxiety, and other mental or emotional conditions.  Changes in your sleep habits or surroundings. This includes changes that happen where you sleep, such as noise, light, or sleeping in a different bed. It also includes changes in your sleep pattern, such as having jet lag or working a late shift.  Health problems, such as pain, breathing problems, and restless legs syndrome.  Lack of regular exercise.  Using alcohol, nicotine, or caffeine before bed.  How can you help yourself?  Here are some tips that may help you sleep more soundly and wake up feeling more refreshed.  Your sleeping area   Use your bedroom only for sleeping and sex. A bit of light reading may help you fall asleep. But if it doesn't, do your reading elsewhere in the house. Try not to use your TV, computer, smartphone, or tablet while you are in bed.  Be sure your bed is big enough to stretch out comfortably, especially if you have a sleep partner.  Keep your bedroom quiet, dark, and cool. Use curtains, blinds, or a sleep mask to block out light. To block out noise, use earplugs, soothing music, or a \"white noise\" machine.  Your evening and " "bedtime routine   Create a relaxing bedtime routine. You might want to take a warm shower or bath, or listen to soothing music.  Go to bed at the same time every night. And get up at the same time every morning, even if you feel tired.  What to avoid   Limit caffeine (coffee, tea, caffeinated sodas) during the day, and don't have any for at least 6 hours before bedtime.  Avoid drinking alcohol before bedtime. Alcohol can cause you to wake up more often during the night.  Try not to smoke or use tobacco, especially in the evening. Nicotine can keep you awake.  Limit naps during the day, especially close to bedtime.  Avoid lying in bed awake for too long. If you can't fall asleep or if you wake up in the middle of the night and can't get back to sleep within about 20 minutes, get out of bed and go to another room until you feel sleepy.  Avoid taking medicine right before bed that may keep you awake or make you feel hyper or energized. Your doctor can tell you if your medicine may do this and if you can take it earlier in the day.  If you can't sleep   Imagine yourself in a peaceful, pleasant scene. Focus on the details and feelings of being in a place that is relaxing.  Get up and do a quiet or boring activity until you feel sleepy.  Avoid drinking any liquids before going to bed to help prevent waking up often to use the bathroom.  Where can you learn more?  Go to https://www.Zscaler.net/patiented  Enter J942 in the search box to learn more about \"Learning About Sleeping Well.\"  Current as of: July 10, 2023  Content Version: 14.2 2024 [facility] [employer].   Care instructions adapted under license by your healthcare professional. If you have questions about a medical condition or this instruction, always ask your healthcare professional. Healthwise, Incorporated disclaims any warranty or liability for your use of this information.       "

## 2024-10-10 ENCOUNTER — PATIENT OUTREACH (OUTPATIENT)
Dept: CARE COORDINATION | Facility: CLINIC | Age: 69
End: 2024-10-10
Payer: COMMERCIAL

## 2024-10-10 PROBLEM — K56.609 SMALL BOWEL OBSTRUCTION (H): Status: RESOLVED | Noted: 2023-01-31 | Resolved: 2024-10-10

## 2024-10-10 PROBLEM — K56.609 SBO (SMALL BOWEL OBSTRUCTION) (H): Status: RESOLVED | Noted: 2022-12-09 | Resolved: 2024-10-10

## 2024-10-10 PROBLEM — R93.5 ABNORMAL CT OF THE ABDOMEN: Status: RESOLVED | Noted: 2023-01-31 | Resolved: 2024-10-10

## 2024-10-10 RX ORDER — LEVOTHYROXINE SODIUM 75 UG/1
75 TABLET ORAL DAILY
Qty: 90 TABLET | Refills: 0 | Status: SHIPPED | OUTPATIENT
Start: 2024-10-10 | End: 2024-10-24

## 2024-10-14 ENCOUNTER — PATIENT OUTREACH (OUTPATIENT)
Dept: CARE COORDINATION | Facility: CLINIC | Age: 69
End: 2024-10-14
Payer: COMMERCIAL

## 2024-10-23 ENCOUNTER — LAB (OUTPATIENT)
Dept: LAB | Facility: CLINIC | Age: 69
End: 2024-10-23
Payer: COMMERCIAL

## 2024-10-23 ENCOUNTER — TELEPHONE (OUTPATIENT)
Dept: FAMILY MEDICINE | Facility: CLINIC | Age: 69
End: 2024-10-23

## 2024-10-23 DIAGNOSIS — C85.9A LYMPHOMA IN REMISSION: ICD-10-CM

## 2024-10-23 DIAGNOSIS — E03.9 HYPOTHYROIDISM, UNSPECIFIED TYPE: ICD-10-CM

## 2024-10-23 DIAGNOSIS — Z13.6 CARDIOVASCULAR SCREENING; LDL GOAL LESS THAN 160: ICD-10-CM

## 2024-10-23 LAB
ALBUMIN SERPL BCG-MCNC: 4.1 G/DL (ref 3.5–5.2)
ALP SERPL-CCNC: 48 U/L (ref 40–150)
ALT SERPL W P-5'-P-CCNC: 18 U/L (ref 0–50)
ANION GAP SERPL CALCULATED.3IONS-SCNC: 10 MMOL/L (ref 7–15)
AST SERPL W P-5'-P-CCNC: 32 U/L (ref 0–45)
BILIRUB SERPL-MCNC: 0.6 MG/DL
BUN SERPL-MCNC: 19.2 MG/DL (ref 8–23)
CALCIUM SERPL-MCNC: 9.3 MG/DL (ref 8.8–10.4)
CHLORIDE SERPL-SCNC: 101 MMOL/L (ref 98–107)
CHOLEST SERPL-MCNC: 263 MG/DL
CREAT SERPL-MCNC: 0.98 MG/DL (ref 0.51–0.95)
EGFRCR SERPLBLD CKD-EPI 2021: 63 ML/MIN/1.73M2
ERYTHROCYTE [DISTWIDTH] IN BLOOD BY AUTOMATED COUNT: 13.3 % (ref 10–15)
FASTING STATUS PATIENT QL REPORTED: YES
FASTING STATUS PATIENT QL REPORTED: YES
GLUCOSE SERPL-MCNC: 92 MG/DL (ref 70–99)
HCO3 SERPL-SCNC: 27 MMOL/L (ref 22–29)
HCT VFR BLD AUTO: 37.1 % (ref 35–47)
HDLC SERPL-MCNC: 70 MG/DL
HGB BLD-MCNC: 12.3 G/DL (ref 11.7–15.7)
LDLC SERPL CALC-MCNC: 169 MG/DL
MCH RBC QN AUTO: 31.5 PG (ref 26.5–33)
MCHC RBC AUTO-ENTMCNC: 33.2 G/DL (ref 31.5–36.5)
MCV RBC AUTO: 95 FL (ref 78–100)
NONHDLC SERPL-MCNC: 193 MG/DL
PLATELET # BLD AUTO: 261 10E3/UL (ref 150–450)
POTASSIUM SERPL-SCNC: 4.9 MMOL/L (ref 3.4–5.3)
PROT SERPL-MCNC: 7.1 G/DL (ref 6.4–8.3)
RBC # BLD AUTO: 3.91 10E6/UL (ref 3.8–5.2)
SODIUM SERPL-SCNC: 138 MMOL/L (ref 135–145)
TRIGL SERPL-MCNC: 118 MG/DL
TSH SERPL DL<=0.005 MIU/L-ACNC: 1.82 UIU/ML (ref 0.3–4.2)
WBC # BLD AUTO: 6.3 10E3/UL (ref 4–11)

## 2024-10-23 PROCEDURE — 80061 LIPID PANEL: CPT

## 2024-10-23 PROCEDURE — 85027 COMPLETE CBC AUTOMATED: CPT

## 2024-10-23 PROCEDURE — 36415 COLL VENOUS BLD VENIPUNCTURE: CPT

## 2024-10-23 PROCEDURE — 84443 ASSAY THYROID STIM HORMONE: CPT

## 2024-10-23 PROCEDURE — 80053 COMPREHEN METABOLIC PANEL: CPT

## 2024-10-23 NOTE — TELEPHONE ENCOUNTER
Patient calling to see if she should take her levothyroxine before her labs today. RN advised to take as she normally would.     Cheryl To RN

## 2024-10-24 RX ORDER — LEVOTHYROXINE SODIUM 75 UG/1
75 TABLET ORAL DAILY
Qty: 90 TABLET | Refills: 3 | Status: SHIPPED | OUTPATIENT
Start: 2024-10-24

## 2024-10-28 NOTE — PROGRESS NOTES
ASSESSMENT & PLAN    Diagnoses and all orders for this visit:    Rotator cuff syndrome of left shoulder  -     Large Joint Injection/Arthocentesis: L subacromial bursa    Chronic left shoulder pain  -     XR Shoulder Left G/E 3 Views; Future  -     Large Joint Injection/Arthocentesis: L subacromial bursa    Other orders  -     Orthopedic  Referral      See Patient Instructions  Patient Instructions   Left shoulder pain consistent with rotator cuff source, we discussed potential for impingement or other cuff pathology.  Discussed considerations around physical therapy, trial of steroid injection, and additional imaging with MRI.  Following discussion, left shoulder subacromial steroid injection done today.  Also placed referral to physical therapy.  Plan to monitor course with injection up to 2 weeks for maximal benefit.  Otherwise, monitor course with physical therapy 4 to 6 weeks to begin.  If improving with above, then follow-up is open-ended.  Otherwise, contact clinic for other questions or concerns.    If you have any further questions for your physician or physician s care team you can contact them thru numberFiret or by calling [phone].          Injection Discharge Instructions    You may shower, however avoid swimming, tub baths or hot tubs for 24 hours following your procedure  You may have a mild to moderate increase in pain for several days following the injection.  It may take up to 14 days for the steroid medication to start working although you may feel the effect as early as a few days after the procedure.  You may use ice packs for 10-15 minutes, 3 to 4 times a day at the injection site for comfort  You may use anti-inflammatory medications (such as Ibuprofen or Aleve or Advil) if you are able to take safely, or acetaminophen (Tylenol) for pain control if necessary  If you were fasting, you may resume your normal diet and medications after the procedure  If you have diabetes, check your  "blood sugar more frequently than usual as your blood sugar may be higher than normal for 10-14 days following a steroid injection. Contact your doctor who manages your diabetes if your blood sugar is higher than usual    If you experience any of the following, call the clinic @ [phone]  - Fever over 100 degrees F  - Swelling, bleeding, redness, drainage, warmth at the injection site  - New or worsening pain      Meño Rajeev Baird DO  [facility]    -----  No chief complaint on file.      SUBJECTIVE  Eleazar Cortez is a/an 69 year old female who is seen in consultation at the request of  Cheryl Serrano PA-C for evaluation of left shoulder.     The patient is seen by themselves.  The patient is Right handed    Onset: 8 month(s) ago. Reports insidious onset without acute precipitating event. Does note hx of falls before in the past.  Location of Pain: left shoulder, diffuse shoulder pain and into the upper arm  Worsened by: Reaching behind  Better with:  Treatments tried: Self massage, some ROM exercises, chiropractic  Associated symptoms: no distal numbness or tingling; denies swelling or warmth    Orthopedic/Surgical history: NO hx known for the left side  Social History/Occupation: Plays the [employer], works as an  for her       **  Above information per rooming staff.  Additional history:  Notes if leaning on left side, reaching behind self gets pain, also some abduction.  Arm at side not so bad.  Doing some self massage to left UE can help.          REVIEW OF SYSTEMS:  Review of Systems    OBJECTIVE:  Ht 1.651 m (5' 5\")   Wt 63 kg (139 lb)   BMI 23.13 kg/m           Left Shoulder exam    ROM:      forward flexion ~140-150, pain        abduction ~130-140, pain       internal rotation thumb upper lumbar, mild left shoulder pain       external rotation mild pain end range  Mildly reduced left compared ot right AROM    Strength:      abduction 5/5       " internal rotation 5/5       external rotation 4+/5  Grossly symmetric    Impingement testing:      positive (+) Peterson       Min pain with empty can    Skin:      no visible deformities       well perfused       capillary refill brisk    Sensation:      normal sensation over shoulder and upper extremity       RADIOLOGY:  Final results and radiologist's interpretation, available in the [facility] health record.  Images were reviewed with the patient in the office today.  My personal interpretation of the performed imaging: Mild AC joint arthrosis.  No acute bony abnormality noted.        XR Shoulder Left G/E 3 Views    Narrative    XR SHOULDER LEFT G/E 3 VIEWS  10/29/2024 2:34 PM     HISTORY: Chronic left shoulder pain  COMPARISON: 10/5/2012      Impression    IMPRESSION: No acute fracture or malalignment. Mild glenohumeral and  acromioclavicular joint degenerative changes. Osteopenia.     SHAKIR HADDAD MD         SYSTEM ID:  [id number]         Large Joint Injection/Arthocentesis: L subacromial bursa    Date/Time: 10/29/2024 3:02 PM    Performed by: Meño Baird DO  Authorized by: Meño Baird DO    Indications:  Pain  Needle Size:  25 G  Guidance: landmark guided    Approach:  Posterolateral  Location:  Shoulder      Site:  L subacromial bursa  Medications:  2 mL lidocaine 1 %; 40 mg triamcinolone 40 MG/ML  Outcome:  Tolerated well, no immediate complications  Procedure discussed: discussed risks, benefits, and alternatives    Consent Given by:  Patient  Timeout: timeout called immediately prior to procedure    Prep: patient was prepped and draped in usual sterile fashion

## 2024-10-29 ENCOUNTER — OFFICE VISIT (OUTPATIENT)
Dept: ORTHOPEDICS | Facility: CLINIC | Age: 69
End: 2024-10-29
Attending: PHYSICIAN ASSISTANT
Payer: COMMERCIAL

## 2024-10-29 ENCOUNTER — ANCILLARY PROCEDURE (OUTPATIENT)
Dept: GENERAL RADIOLOGY | Facility: CLINIC | Age: 69
End: 2024-10-29
Attending: PEDIATRICS
Payer: COMMERCIAL

## 2024-10-29 VITALS — WEIGHT: 139 LBS | BODY MASS INDEX: 23.16 KG/M2 | HEIGHT: 65 IN

## 2024-10-29 DIAGNOSIS — M25.512 CHRONIC LEFT SHOULDER PAIN: ICD-10-CM

## 2024-10-29 DIAGNOSIS — G89.29 CHRONIC LEFT SHOULDER PAIN: ICD-10-CM

## 2024-10-29 DIAGNOSIS — M75.102 ROTATOR CUFF SYNDROME OF LEFT SHOULDER: Primary | ICD-10-CM

## 2024-10-29 PROCEDURE — 99204 OFFICE O/P NEW MOD 45 MIN: CPT | Mod: 25 | Performed by: PEDIATRICS

## 2024-10-29 PROCEDURE — 73030 X-RAY EXAM OF SHOULDER: CPT | Mod: TC | Performed by: RADIOLOGY

## 2024-10-29 PROCEDURE — 20610 DRAIN/INJ JOINT/BURSA W/O US: CPT | Mod: LT | Performed by: PEDIATRICS

## 2024-10-29 RX ADMIN — LIDOCAINE HYDROCHLORIDE 2 ML: 10 INJECTION, SOLUTION INFILTRATION; PERINEURAL at 15:02

## 2024-10-29 RX ADMIN — TRIAMCINOLONE ACETONIDE 40 MG: 40 INJECTION, SUSPENSION INTRA-ARTICULAR; INTRAMUSCULAR at 15:02

## 2024-10-29 NOTE — PATIENT INSTRUCTIONS
Left shoulder pain consistent with rotator cuff source, we discussed potential for impingement or other cuff pathology.  Discussed considerations around physical therapy, trial of steroid injection, and additional imaging with MRI.  Following discussion, left shoulder subacromial steroid injection done today.  Also placed referral to physical therapy.  Plan to monitor course with injection up to 2 weeks for maximal benefit.  Otherwise, monitor course with physical therapy 4 to 6 weeks to begin.  If improving with above, then follow-up is open-ended.  Otherwise, contact clinic for other questions or concerns.    If you have any further questions for your physician or physician s care team you can contact them thru Bar[employer]art or by calling [phone].          Injection Discharge Instructions    You may shower, however avoid swimming, tub baths or hot tubs for 24 hours following your procedure  You may have a mild to moderate increase in pain for several days following the injection.  It may take up to 14 days for the steroid medication to start working although you may feel the effect as early as a few days after the procedure.  You may use ice packs for 10-15 minutes, 3 to 4 times a day at the injection site for comfort  You may use anti-inflammatory medications (such as Ibuprofen or Aleve or Advil) if you are able to take safely, or acetaminophen (Tylenol) for pain control if necessary  If you were fasting, you may resume your normal diet and medications after the procedure  If you have diabetes, check your blood sugar more frequently than usual as your blood sugar may be higher than normal for 10-14 days following a steroid injection. Contact your doctor who manages your diabetes if your blood sugar is higher than usual    If you experience any of the following, call the clinic @ [phone]  - Fever over 100 degrees F  - Swelling, bleeding, redness, drainage, warmth at the injection site  - New or worsening  pain

## 2024-10-29 NOTE — Clinical Note
"10/29/2024      Eleazar Cortez  [address] [place] [address]      Dear Colleague,    Thank you for referring your patient, Eleazar Cortez, to the [facility]. Please see a copy of my visit note below.    ASSESSMENT & PLAN    Diagnoses and all orders for this visit:    Chronic left shoulder pain  -     XR Shoulder Left G/E 3 Views; Future    Other orders  -     Orthopedic  Referral      This issue is {ACUTE/CHRONIC:357549} and {IMPROVING WORSENIN}.      {FOLLOW UP PLANS (Optional):589039}    Meño Baird DO  [facility]    -----  No chief complaint on file.      SUBJECTIVE  Eleazar Cortez is a/an 69 year old female who is seen in consultation at the request of  Cheryl Serrano PA-C for evaluation of left shoulder.     The patient is seen by themselves.  The patient is Right handed    Onset: 8 month(s) ago. Reports insidious onset without acute precipitating event. Does note hx of falls before in the past.  Location of Pain: left shoulder, diffuse shoulder pain and into the upper arm  Worsened by: Reaching behind  Better with:  Treatments tried: Self massage, some ROM exercises, chiropractic  Associated symptoms: no distal numbness or tingling; denies swelling or warmth    Orthopedic/Surgical history: NO hx known for the left side  Social History/Occupation: Plays the [employer], works as an  for her       **  Above information per rooming staff.  Additional history:  Notes if leaning on left side, reaching behind self gets pain, also some abduction.  Arm at side not so bad.  Doing some self massage to left UE can help.          REVIEW OF SYSTEMS:  Review of Systems    OBJECTIVE:  Ht 1.651 m (5' 5\")   Wt 63 kg (139 lb)   BMI 23.13 kg/m           Left Shoulder exam    ROM:      forward flexion ~140-150, pain        abduction ~130-140, pain       internal rotation thumb upper lumbar, mild " left shoulder pain       external rotation mild pain end range  Mildly reduced left compared ot right AROM      Tender: {shoulder Tenderness:108936}    Non Tender:{shoulder Tenderness:399448}    Strength:      abduction {STRENGTH:602771}       internal rotation {STRENGTH:046014}       external rotation {STRENGTH:670700}  Grossly symmetric    Impingement testing:      positive (+) Peterson       {neg/pos:080449} empty can    Stability testing: {stability :396164}    Skin: {skin:647085}    Sensation: {sensation:329292}       RADIOLOGY:  Final results and radiologist's interpretation, available in the [facility] health record.  Images were reviewed with the patient in the office today.  My personal interpretation of the performed imaging: Mild AC joint arthrosis.  No acute bony abnormality noted.        ***        {[facility] 2021 Documentation (Optional):537368}  {2021 E&M time (Optional):704280}  {Provider  Link to [facility] Help Grid :035581}         Again, thank you for allowing me to participate in the care of your patient.        Sincerely,        Meño Baird DO

## 2024-11-03 RX ORDER — TRIAMCINOLONE ACETONIDE 40 MG/ML
40 INJECTION, SUSPENSION INTRA-ARTICULAR; INTRAMUSCULAR
Status: COMPLETED | OUTPATIENT
Start: 2024-10-29 | End: 2024-10-29

## 2024-11-03 RX ORDER — LIDOCAINE HYDROCHLORIDE 10 MG/ML
2 INJECTION, SOLUTION INFILTRATION; PERINEURAL
Status: COMPLETED | OUTPATIENT
Start: 2024-10-29 | End: 2024-10-29

## 2024-11-12 ENCOUNTER — HOSPITAL ENCOUNTER (OUTPATIENT)
Dept: BONE DENSITY | Facility: CLINIC | Age: 69
Discharge: HOME | End: 2024-11-12
Attending: PHYSICIAN ASSISTANT
Payer: COMMERCIAL

## 2024-11-12 ENCOUNTER — HOSPITAL ENCOUNTER (OUTPATIENT)
Dept: MAMMOGRAPHY | Facility: CLINIC | Age: 69
Discharge: HOME | End: 2024-11-12
Attending: PHYSICIAN ASSISTANT
Payer: COMMERCIAL

## 2024-11-12 DIAGNOSIS — Z12.31 VISIT FOR SCREENING MAMMOGRAM: ICD-10-CM

## 2024-11-12 DIAGNOSIS — M81.0 OSTEOPOROSIS WITHOUT CURRENT PATHOLOGICAL FRACTURE, UNSPECIFIED OSTEOPOROSIS TYPE: ICD-10-CM

## 2024-11-12 PROCEDURE — 77080 DXA BONE DENSITY AXIAL: CPT

## 2024-11-12 PROCEDURE — 77063 BREAST TOMOSYNTHESIS BI: CPT

## 2024-12-15 DIAGNOSIS — M81.0 OSTEOPOROSIS WITHOUT CURRENT PATHOLOGICAL FRACTURE, UNSPECIFIED OSTEOPOROSIS TYPE: ICD-10-CM

## 2024-12-15 RX ORDER — IBANDRONATE SODIUM 150 MG/1
1 TABLET, FILM COATED ORAL
Qty: 3 TABLET | Refills: 2 | Status: SHIPPED | OUTPATIENT
Start: 2024-12-15

## 2025-01-09 ENCOUNTER — TELEPHONE (OUTPATIENT)
Dept: FAMILY MEDICINE | Facility: CLINIC | Age: 70
End: 2025-01-09

## 2025-01-09 NOTE — TELEPHONE ENCOUNTER
Reason for Call:  Other appointment    Detailed comments: labs for cancer screening due to getting injections and cannot have cancer, she has had it before    Phone Number Patient can be reached at: Cell number on file:    Telephone Information:   Mobile [phone]       Best Time: anytime    Can we leave a detailed message on this number? YES    Call taken on 1/9/2025 at 10:33 AM by Faby Zamudio

## 2025-01-10 NOTE — TELEPHONE ENCOUNTER
Please call Eleazar to clarify her concern.  We have been monitoring a CBC and CMP once per year due to previous history of lymphoma.  This was last done 10/23/24, therefore she does not need to recheck at this time.  If she is having any concerning symptoms, please schedule a video visit.        Cheryl Serrano PA-C

## 2025-01-13 NOTE — TELEPHONE ENCOUNTER
RN called patient and relayed providers message. Patient verbalized understanding.       Patient states she just wanted to have it checked, she is doing a new neuropathy program w/ [facility]. She states she has bad neuropathy on feet and is trying something different for tx     Did laser for approx 20 tx with another place but did not help     She is doing patches currently with new neuropathy program, called lifewave X 39 that she changes daily and is supposed to activate stem cells   She is having night sweats and is unsure if it is from patches, she states she's wakes up and flannels are damp, has not had night sweats for a long time.     Neuropathy program has an injection that patient discussed doing 1 month ago but they stopped with injection after 2 injections into ankle (supposed to be 6) and stated if cancer cell in body it can multiply them. She is schedule to have an injection Friday.     Had  MOHS surgery on her nose this AM had to do 2x/ due to placement. Was told everything was removed. Still is wondering if she should go through with injections Friday with recent procedure and new night sweats. Wanting labs to double check before doing this injection.     If she has blood test that day would need to know prior to appointment or will have to reschedule for another month out. Sounds like patient is wanting to do CMP, CBC, CRP inflammation and anything else provider recommends     RN scheduled patient to see PCP virtually 1/15/25 at 9:30 am.       Paulette Alejandra RN on 1/13/2025 at 11:26 AM

## 2025-01-15 ENCOUNTER — LAB (OUTPATIENT)
Dept: LAB | Facility: CLINIC | Age: 70
End: 2025-01-15
Payer: COMMERCIAL

## 2025-01-15 ENCOUNTER — VIRTUAL VISIT (OUTPATIENT)
Dept: FAMILY MEDICINE | Facility: CLINIC | Age: 70
End: 2025-01-15
Payer: COMMERCIAL

## 2025-01-15 DIAGNOSIS — G60.3 IDIOPATHIC PROGRESSIVE NEUROPATHY: ICD-10-CM

## 2025-01-15 DIAGNOSIS — C85.9A LYMPHOMA IN REMISSION: ICD-10-CM

## 2025-01-15 DIAGNOSIS — C44.310 BCC (BASAL CELL CARCINOMA), FACE: Primary | ICD-10-CM

## 2025-01-15 LAB
BASOPHILS # BLD AUTO: 0.1 10E3/UL (ref 0–0.2)
BASOPHILS NFR BLD AUTO: 1 %
EOSINOPHIL # BLD AUTO: 0.3 10E3/UL (ref 0–0.7)
EOSINOPHIL NFR BLD AUTO: 6 %
ERYTHROCYTE [DISTWIDTH] IN BLOOD BY AUTOMATED COUNT: 13 % (ref 10–15)
HCT VFR BLD AUTO: 37.9 % (ref 35–47)
HGB BLD-MCNC: 12.4 G/DL (ref 11.7–15.7)
IMM GRANULOCYTES # BLD: 0 10E3/UL
IMM GRANULOCYTES NFR BLD: 0 %
LYMPHOCYTES # BLD AUTO: 1.9 10E3/UL (ref 0.8–5.3)
LYMPHOCYTES NFR BLD AUTO: 34 %
MCH RBC QN AUTO: 31.2 PG (ref 26.5–33)
MCHC RBC AUTO-ENTMCNC: 32.7 G/DL (ref 31.5–36.5)
MCV RBC AUTO: 96 FL (ref 78–100)
MONOCYTES # BLD AUTO: 0.4 10E3/UL (ref 0–1.3)
MONOCYTES NFR BLD AUTO: 7 %
NEUTROPHILS # BLD AUTO: 2.9 10E3/UL (ref 1.6–8.3)
NEUTROPHILS NFR BLD AUTO: 52 %
PLATELET # BLD AUTO: 275 10E3/UL (ref 150–450)
RBC # BLD AUTO: 3.97 10E6/UL (ref 3.8–5.2)
WBC # BLD AUTO: 5.6 10E3/UL (ref 4–11)

## 2025-01-15 PROCEDURE — 80053 COMPREHEN METABOLIC PANEL: CPT

## 2025-01-15 PROCEDURE — 86140 C-REACTIVE PROTEIN: CPT

## 2025-01-15 PROCEDURE — 36415 COLL VENOUS BLD VENIPUNCTURE: CPT

## 2025-01-15 PROCEDURE — 85025 COMPLETE CBC W/AUTO DIFF WBC: CPT

## 2025-01-15 RX ORDER — GABAPENTIN 300 MG/1
CAPSULE ORAL
Qty: 84 CAPSULE | Refills: 0 | Status: SHIPPED | OUTPATIENT
Start: 2025-01-15 | End: 2025-02-14

## 2025-01-15 NOTE — PROGRESS NOTES
Eleazar is a 69 year old who is being evaluated via a billable video visit.    How would you like to obtain your AVS? MyChart  If the video visit is dropped, the invitation should be resent by: Text to cell phone: [phone]  Will anyone else be joining your video visit? No  {If patient encounters technical issues they should call [phone] :[id number]}    Assessment & Plan     BCC (basal cell carcinoma), face  Recently had Moh's surgery (has had a total of 3 basal cell carcinoma's on face).     Lymphoma in remission  Has not seen oncologist in quite some time.  She has been in remission, however told to monitor for night sweats.  She has been experiencing some night sweats and she is unsure if this is related to the new neuropathy treatment she started 2 months ago.  1 of the injections was stopped mid injection when she mentioned her cancer history.  I discussed with Eleazar that this ***  - Comprehensive metabolic panel (BMP + Alb, Alk Phos, ALT, AST, Total. Bili, TP); Future  - CBC with platelets and differential; Future    Idiopathic progressive neuropathy  ***  - CRP, inflammation; Future  - gabapentin (NEURONTIN) 300 MG capsule; Take 1 capsule (300 mg) by mouth at bedtime for 2 days, THEN 1 capsule (300 mg) 2 times daily for 2 days, THEN 1 capsule (300 mg) 3 times daily for 26 days.            {FOLLOW UP PLANS (Optional) Includes COVID19 Treatment Plan:040124}    Subjective   Eleazar is a 69 year old, presenting for the following health issues:  Consult        1/15/2025     8:33 AM   Additional Questions   Roomed by Sushma   Accompanied by Self check in       Video Start Time: 9:31 AM    Patient arrived to discuss lab work up for cancer hx.     Also experiencing Night sweats and wanting to start injections for neuropathy.     History of Present Illness       Reason for visit:  To have some lab work to check for cancer... have mild night sweats. Currently doing Neuropathy Therapy which includes HCTP Injections  (Human  "Cellular Tissue Product).  Repairs damaged tissue, but can also multiply cancer cells.    She eats 2-3 servings of fruits and vegetables daily.She consumes 0 sweetened beverage(s) daily.She exercises with enough effort to increase her heart rate 10 to 19 minutes per day.  She exercises with enough effort to increase her heart rate 3 or less days per week.   She is taking medications regularly.     Recently had Mohs surgery for Basal Cell Carcinoma on nose.      She has seen neurologist in the past, they didn't have any suggestions.    She got a card in the mail for this 90 day program. Currently 2 months in.   She has a chiropracter visit once per week, home therapy with red light 25 min BID, nerve plate daily, patch (life wave patch which activates her stem cells through tone crystals) photobiomodulation, weekly lumbar decompression table along with electrode stimulation.     Once per month she is supposed  Hctp (human cellular tissue product) injection with cells from an umbilical cord.     She has started to have night sweats for a few months.  She was told by her oncologist (when she had lymphoma) that if night sweats return, she should recheck.  Flannel night shirt is damp.       Neuropathy is left foot up into calf.  Area feels numb and stiff.  No meds tried for neuropathy.  Her dad does take gabapentin.        Telephone message;  \"Patient states she just wanted to have it checked, she is doing a new neuropathy program / [facility]. She states she has bad neuropathy on feet and is trying something different for tx      Did laser for approx 20 tx with another place but did not help      She is doing patches currently with new neuropathy program, called [employer]eline[employer] X 39 that she changes daily and is supposed to activate stem cells   She is having night sweats and is unsure if it is from patches, she states she's wakes up and flannels are damp, has not had night sweats for a long time.      Neuropathy " "program has an injection that patient discussed doing 1 month ago but they stopped with injection after 2 injections into ankle (supposed to be 6) and stated if cancer cell in body it can multiply them. She is schedule to have an injection Friday.      Had  MOHS surgery on her nose this AM had to do 2x/ due to placement. Was told everything was removed. Still is wondering if she should go through with injections Friday with recent procedure and new night sweats. Wanting labs to double check before doing this injection.      If she has blood test that day would need to know prior to appointment or will have to reschedule for another month out. Sounds like patient is wanting to do CMP, CBC, CRP inflammation and anything else provider recommends\"       {SUPERLIST (Optional):491434}  {additonal problems for provider to add (Optional):197290}    {ROS Picklists (Optional):606647}      Objective           Vitals:  No vitals were obtained today due to virtual visit.    Physical Exam   {video visit exam brief selected:527216}    {Diagnostic Test Results (Optional):202093}      Video-Visit Details    Type of service:  Video Visit   Video End Time:10:04 AM  Originating Location (pt. Location): Home  {PROVIDER LOCATION On-site should be selected for visits conducted from your clinic location or adjoining [facility] hospital, academic office, or other nearby [facility] building. Off-site should be selected for all other provider locations, including home:881019}  Distant Location (provider location):  On-site  Platform used for Video Visit: Richard  Signed Electronically by: Cheryl Serrano PA-C  {Email feedback regarding this note to [email or web address]   :[id number]}  "

## 2025-01-16 LAB
ALBUMIN SERPL BCG-MCNC: 4.2 G/DL (ref 3.5–5.2)
ALP SERPL-CCNC: 50 U/L (ref 40–150)
ALT SERPL W P-5'-P-CCNC: 14 U/L (ref 0–50)
ANION GAP SERPL CALCULATED.3IONS-SCNC: 9 MMOL/L (ref 7–15)
AST SERPL W P-5'-P-CCNC: 28 U/L (ref 0–45)
BILIRUB SERPL-MCNC: 0.5 MG/DL
BUN SERPL-MCNC: 16.3 MG/DL (ref 8–23)
CALCIUM SERPL-MCNC: 9.4 MG/DL (ref 8.8–10.4)
CHLORIDE SERPL-SCNC: 103 MMOL/L (ref 98–107)
CREAT SERPL-MCNC: 0.94 MG/DL (ref 0.51–0.95)
CRP SERPL-MCNC: <3 MG/L
EGFRCR SERPLBLD CKD-EPI 2021: 66 ML/MIN/1.73M2
GLUCOSE SERPL-MCNC: 87 MG/DL (ref 70–99)
HCO3 SERPL-SCNC: 29 MMOL/L (ref 22–29)
POTASSIUM SERPL-SCNC: 4.8 MMOL/L (ref 3.4–5.3)
PROT SERPL-MCNC: 6.9 G/DL (ref 6.4–8.3)
SODIUM SERPL-SCNC: 141 MMOL/L (ref 135–145)

## 2025-05-21 ENCOUNTER — MYC MEDICAL ADVICE (OUTPATIENT)
Dept: FAMILY MEDICINE | Facility: CLINIC | Age: 70
End: 2025-05-21
Payer: COMMERCIAL

## 2025-06-18 DIAGNOSIS — G60.3 IDIOPATHIC PROGRESSIVE NEUROPATHY: ICD-10-CM

## 2025-06-18 RX ORDER — GABAPENTIN 300 MG/1
300 CAPSULE ORAL 3 TIMES DAILY
Qty: 270 CAPSULE | Refills: 1 | Status: SHIPPED | OUTPATIENT
Start: 2025-06-18

## 2025-07-12 ENCOUNTER — HEALTH MAINTENANCE LETTER (OUTPATIENT)
Age: 70
End: 2025-07-12

## (undated) DEVICE — SU STRATAFIX MONOCRYL 3-0 SPIRAL PS-2 30CM SXMP1B106

## (undated) DEVICE — SUTURE VICRYL+ 2-0 36IN CT-1 UND VCP945H

## (undated) DEVICE — DRSG AQUACEL AG HYDROFIBER  3.5X10" 422605

## (undated) DEVICE — SU PDO 1 STRATAFIX 36X36CM CTX TAPERPOINT SXPD2B405

## (undated) DEVICE — GLOVE BIOGEL INDICATOR 7.5 LF 41675

## (undated) DEVICE — DECANTER VIAL 2006S

## (undated) DEVICE — CUSTOM PACK TOTAL KNEE SOP5BTKHEC

## (undated) DEVICE — SOL WATER IRRIG 1000ML BOTTLE 2F7114

## (undated) DEVICE — CUSTOM PACK TOTAL KNEE ACCESSORY SOP5BTAHEA

## (undated) DEVICE — DRAPE POUCH INSTRUMENT 3 POCKET 1018L

## (undated) DEVICE — SOL NACL 0.9% IRRIG 1000ML BOTTLE 2F7124

## (undated) DEVICE — GLOVE SURG PI ULTRA TOUCH M SZ 8 LF

## (undated) DEVICE — HOLDER LIMB VELCRO OR 0814-1533

## (undated) DEVICE — GLOVE UNDER INDICATOR PI SZ 8.5 LF 41685

## (undated) DEVICE — SUCTION MANIFOLD NEPTUNE 2 SYS 4 PORT 0702-020-000

## (undated) DEVICE — A3 SUPPLIES- SEE NURSING INFO PAGE

## (undated) DEVICE — PLATE GROUNDING ADULT W/CORD 9165L

## (undated) DEVICE — DRSG TEGADERM 4X4 3/4" 1626W

## (undated) DEVICE — BANDAGE ELASTIC 6X550 LF DBL 593-96LF

## (undated) DEVICE — KIT DRAIN CLOSED WOUND SUCTION MED 400ML RESVR

## (undated) DEVICE — GLOVE BIOGEL PI ULTRATOUCH G SZ 7.0 42170

## (undated) DEVICE — DRSG STERI STRIP 1/2X4" R1547

## (undated) DEVICE — BLADE SAW SAGITTAL STRK 18X90X1.27MM HD SYS 6 6118-127-090

## (undated) DEVICE — SOL NACL 0.9% INJ 1000ML BAG 2B1324X

## (undated) RX ORDER — FENTANYL CITRATE 50 UG/ML
INJECTION, SOLUTION INTRAMUSCULAR; INTRAVENOUS
Status: DISPENSED
Start: 2023-03-16